# Patient Record
Sex: FEMALE | Race: WHITE | Employment: OTHER | URBAN - METROPOLITAN AREA
[De-identification: names, ages, dates, MRNs, and addresses within clinical notes are randomized per-mention and may not be internally consistent; named-entity substitution may affect disease eponyms.]

---

## 2017-01-05 ENCOUNTER — TELEPHONE (OUTPATIENT)
Dept: GASTROENTEROLOGY | Facility: CLINIC | Age: 57
End: 2017-01-05

## 2017-01-05 ENCOUNTER — ANESTHESIA (OUTPATIENT)
Dept: SURGERY | Facility: CLINIC | Age: 57
End: 2017-01-05
Payer: COMMERCIAL

## 2017-01-05 ENCOUNTER — SURGERY (OUTPATIENT)
Age: 57
End: 2017-01-05

## 2017-01-05 ENCOUNTER — ANESTHESIA EVENT (OUTPATIENT)
Dept: SURGERY | Facility: CLINIC | Age: 57
End: 2017-01-05
Payer: COMMERCIAL

## 2017-01-05 PROCEDURE — 25000125 ZZHC RX 250: Performed by: NURSE ANESTHETIST, CERTIFIED REGISTERED

## 2017-01-05 PROCEDURE — 25800025 ZZH RX 258: Performed by: NURSE ANESTHETIST, CERTIFIED REGISTERED

## 2017-01-05 RX ORDER — PROPOFOL 10 MG/ML
INJECTION, EMULSION INTRAVENOUS PRN
Status: DISCONTINUED | OUTPATIENT
Start: 2017-01-05 | End: 2017-01-05

## 2017-01-05 RX ORDER — NEOSTIGMINE METHYLSULFATE 1 MG/ML
VIAL (ML) INJECTION PRN
Status: DISCONTINUED | OUTPATIENT
Start: 2017-01-05 | End: 2017-01-05

## 2017-01-05 RX ORDER — GLYCOPYRROLATE 0.2 MG/ML
INJECTION, SOLUTION INTRAMUSCULAR; INTRAVENOUS PRN
Status: DISCONTINUED | OUTPATIENT
Start: 2017-01-05 | End: 2017-01-05

## 2017-01-05 RX ORDER — ONDANSETRON 2 MG/ML
INJECTION INTRAMUSCULAR; INTRAVENOUS PRN
Status: DISCONTINUED | OUTPATIENT
Start: 2017-01-05 | End: 2017-01-05

## 2017-01-05 RX ORDER — SODIUM CHLORIDE, SODIUM LACTATE, POTASSIUM CHLORIDE, CALCIUM CHLORIDE 600; 310; 30; 20 MG/100ML; MG/100ML; MG/100ML; MG/100ML
INJECTION, SOLUTION INTRAVENOUS CONTINUOUS PRN
Status: DISCONTINUED | OUTPATIENT
Start: 2017-01-05 | End: 2017-01-05

## 2017-01-05 RX ORDER — LIDOCAINE HYDROCHLORIDE 20 MG/ML
INJECTION, SOLUTION INFILTRATION; PERINEURAL PRN
Status: DISCONTINUED | OUTPATIENT
Start: 2017-01-05 | End: 2017-01-05

## 2017-01-05 RX ORDER — FENTANYL CITRATE 50 UG/ML
INJECTION, SOLUTION INTRAMUSCULAR; INTRAVENOUS PRN
Status: DISCONTINUED | OUTPATIENT
Start: 2017-01-05 | End: 2017-01-05

## 2017-01-05 RX ADMIN — WATER 100 ML: 100 IRRIGANT IRRIGATION at 10:23

## 2017-01-05 RX ADMIN — SODIUM CHLORIDE, POTASSIUM CHLORIDE, SODIUM LACTATE AND CALCIUM CHLORIDE: 600; 310; 30; 20 INJECTION, SOLUTION INTRAVENOUS at 09:20

## 2017-01-05 RX ADMIN — PROPOFOL 60 MG: 10 INJECTION, EMULSION INTRAVENOUS at 09:53

## 2017-01-05 RX ADMIN — PHENYLEPHRINE HYDROCHLORIDE 100 MCG: 10 INJECTION, SOLUTION INTRAMUSCULAR; INTRAVENOUS; SUBCUTANEOUS at 10:15

## 2017-01-05 RX ADMIN — ONDANSETRON 4 MG: 2 INJECTION INTRAMUSCULAR; INTRAVENOUS at 10:23

## 2017-01-05 RX ADMIN — Medication 3.5 MG: at 10:35

## 2017-01-05 RX ADMIN — MIDAZOLAM HYDROCHLORIDE 1.5 MG: 1 INJECTION, SOLUTION INTRAMUSCULAR; INTRAVENOUS at 09:39

## 2017-01-05 RX ADMIN — FENTANYL CITRATE 150 MCG: 50 INJECTION, SOLUTION INTRAMUSCULAR; INTRAVENOUS at 09:53

## 2017-01-05 RX ADMIN — SIMETHICONE 4 ML: 63.3; 3.7 SOLUTION/ DROPS ORAL at 10:23

## 2017-01-05 RX ADMIN — LIDOCAINE HYDROCHLORIDE 50 MG: 20 INJECTION, SOLUTION INFILTRATION; PERINEURAL at 09:53

## 2017-01-05 RX ADMIN — PHENYLEPHRINE HYDROCHLORIDE 100 MCG: 10 INJECTION, SOLUTION INTRAMUSCULAR; INTRAVENOUS; SUBCUTANEOUS at 10:09

## 2017-01-05 RX ADMIN — SODIUM CHLORIDE, POTASSIUM CHLORIDE, SODIUM LACTATE AND CALCIUM CHLORIDE: 600; 310; 30; 20 INJECTION, SOLUTION INTRAVENOUS at 10:30

## 2017-01-05 RX ADMIN — ROCURONIUM BROMIDE 30 MG: 10 INJECTION INTRAVENOUS at 09:53

## 2017-01-05 RX ADMIN — GLYCOPYRROLATE 0.6 MG: 0.2 INJECTION, SOLUTION INTRAMUSCULAR; INTRAVENOUS at 10:35

## 2017-01-05 ASSESSMENT — LIFESTYLE VARIABLES: TOBACCO_USE: 1

## 2017-01-05 NOTE — ANESTHESIA CARE TRANSFER NOTE
Patient: Alka Mcfadden    ENDOSCOPIC ULTRASOUND, ESOPHAGOSCOPY / UPPER GASTROINTESTINAL TRACT (GI) (N/A Esophagus)  COMBINED ESOPHAGOSCOPY, GASTROSCOPY, DUODENOSCOPY (EGD) (N/A Esophagus)  Additional InformationProcedure(s):  Endoscopic Ultrasound, Upper Endoscopy with biopsies - Wound Class: II-Clean Contaminated   - Wound Class: II-Clean Contaminated    Diagnosis: Abdominal Pain, Nausea and Vomiting   Diagnosis Additional Information: No value filed.    Anesthesia Type:   General     Note:  Airway :Face Mask  Patient transferred to:PACU  Comments: tO  Par awakening and ventilating welll color pink  VSS IV infusing well color pink   Report to nurses  [place]   CRNA STEV[place]ILL      Vitals: (Last set prior to Anesthesia Care Transfer)              Electronically Signed By: BOBBY CAMARILLO CRNA  January 5, 2017  10:52 AM

## 2017-01-05 NOTE — ANESTHESIA POSTPROCEDURE EVALUATION
Patient: Alka Mcfadden    ENDOSCOPIC ULTRASOUND, ESOPHAGOSCOPY / UPPER GASTROINTESTINAL TRACT (GI) (N/A Esophagus)  COMBINED ESOPHAGOSCOPY, GASTROSCOPY, DUODENOSCOPY (EGD) (N/A Esophagus)  Additional InformationProcedure(s):  Endoscopic Ultrasound, Upper Endoscopy with biopsies - Wound Class: II-Clean Contaminated   - Wound Class: II-Clean Contaminated    Diagnosis:Abdominal Pain, Nausea and Vomiting   Diagnosis Additional Information: No value filed.    Anesthesia Type:  General    Note:  Anesthesia Post Evaluation    Patient location during evaluation: PACU  Patient participation: Able to fully participate in evaluation  Level of consciousness: awake  Pain management: satisfactory to patient  Airway patency: patent  Cardiovascular status: acceptable  Respiratory status: acceptable  Hydration status: acceptable  PONV: none     Anesthetic complications: None          Last vitals:  Filed Vitals:    01/05/17 0755 01/05/17 1100   BP: 110/78 109/64   Pulse: 79    Temp: 36.4  C (97.6  F)    Resp: 16    SpO2: 99% 98%       Electronically Signed By: Rubio Duvall MD  January 5, 2017  11:03 AM

## 2017-01-05 NOTE — TELEPHONE ENCOUNTER
Please see EGD/EUS report from today.    I did not find any likely source for her pain or weight loss. She does have a gallbladder stone but not features of cholecystitis.    The pancreas is fatty but otherwise unremarkable and this is not of clinical significance.    I took small bowel biopsies per request of inpt GI service but likely to be normal.  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 Presuming this is confirmed, she should be taking her PPI daily, which has a script for but not taking.    I would agree with rec for colonoscopy. She is under the impression this is scheduled someplace.  Also - I do not see any scheduled followup with GI. If requested, this should be with one of the luminal GI docs who saw during her inpt stay or perhaps the outpt luminal GI nurse practitioner. This could be coordinated through our inpt GI NP, Keith Lyman, who I am cc'ing on this note.    JOSELYN Mortensen MD  Associate Professor of Medicine  Division of Gastroenterology, Hepatology and Nutrition  [facility]

## 2017-01-05 NOTE — ANESTHESIA PREPROCEDURE EVALUATION
Anesthesia Evaluation     . Pt has had prior anesthetic. Type: General    No history of anesthetic complications     ROS/MED HX    ENT/Pulmonary:     (+)tobacco use, , . .    Neurologic:     (+)other neuro CRPS lower limb    Cardiovascular:     (+) Dyslipidemia, ----. : . . . :. .       METS/Exercise Tolerance:     Hematologic:  - neg hematologic  ROS       Musculoskeletal:   (+) , , other musculoskeletal- Ankylosing Spondylitis; CRPS lower limb; spine fused      GI/Hepatic:     (+) Other GI/Hepatic nausea; pancreatic atrophy      Renal/Genitourinary:     (+) Other Renal/ Genitourinary, vaginal bleeding      Endo:  - neg endo ROS       Psychiatric:     (+) psychiatric history anxiety, depression and other (comment)      Infectious Disease:  - neg infectious disease ROS       Malignancy:      - no malignancy   Other:    (+) H/O Chronic Pain,H/O chronic opiod use ,              Physical Exam  Normal systems: cardiovascular and pulmonary    Airway   Mallampati: I  TM distance: >3 FB  Neck ROM: limited    Dental   (+) upper dentures    Cardiovascular   Rhythm and rate: regular and normal      Pulmonary    breath sounds clear to auscultation                    Anesthesia Plan      History & Physical Review  History and physical reviewed and following examination; no interval change.    ASA Status:  3 .    NPO Status:  > 8 hours    Plan for General with Intravenous and Propofol induction. Maintenance will be Balanced.    PONV prophylaxis:  Ondansetron (or other 5HT-3)  Additional equipment: Videolaryngoscope      Postoperative Care  Postoperative pain management:  IV analgesics and Multi-modal analgesia.      Consents                          .

## 2017-01-06 ENCOUNTER — TELEPHONE (OUTPATIENT)
Dept: GASTROENTEROLOGY | Facility: CLINIC | Age: 57
End: 2017-01-06

## 2017-01-06 NOTE — TELEPHONE ENCOUNTER
Called patient to schedule follow up appointment with Gi to further assess abd pain and barretts. Patient states she will call back later date to schedule she would like to get colonoscopy scheduled first which she will do with her provider.i did give patient my phone number to reach me once she is ready to schedule.

## 2017-01-07 ENCOUNTER — TELEPHONE (OUTPATIENT)
Dept: FAMILY MEDICINE | Facility: CLINIC | Age: 57
End: 2017-01-07

## 2017-01-07 NOTE — TELEPHONE ENCOUNTER
Please contact Alka and her  and schedule a colonoscopy as soon as possible, give the prep needed in the prior three days.  Please let me know if there is any difficulty scheduling this. She has lost a lot of weight and the other possible sources of acute concern (stomach and pancrease) have recently been examined and the results do not point to a likely cause of her weight loss and chronic pain.  This makes the Colonoscopy more urgent.  The orders are already in EPIC.

## 2017-01-09 NOTE — TELEPHONE ENCOUNTER
Patient scheudled with [facility] 1/13/2017 at 3:30pm for a colonsocopy. Patient notified.        Monique Maya,  Care Management Coordinator

## 2017-01-11 ENCOUNTER — TELEPHONE (OUTPATIENT)
Dept: FAMILY MEDICINE | Facility: CLINIC | Age: 57
End: 2017-01-11

## 2017-01-11 DIAGNOSIS — K22.70 BARRETT'S ESOPHAGUS DETERMINED BY BIOPSY: Primary | ICD-10-CM

## 2017-01-11 NOTE — TELEPHONE ENCOUNTER
Reason for Call:  Request for results:    Name of test or procedure: Endoscopy     Date of test of procedure: 1/5/17    Location of the test or procedure: [facility]     OK to leave the result message on voice mail or with a family member? YES    Phone number Patient can be reached at:  Home number on file [phone] (home)    Additional comments: pt would also like something for her Acid Reflux     Call taken on 1/11/2017 at 10:00 AM by Pawel De La Rsoa        Thank you,     Pawel De La Rosa     [facility]     n

## 2017-01-11 NOTE — TELEPHONE ENCOUNTER
Will forward to PCP to review procedure and advise, Writer will call pt after.    Nickolas Dempsey RN

## 2017-01-11 NOTE — TELEPHONE ENCOUNTER
Writer called pt back.  Pt is scheduled with FELTON Davila on Thursday of this week.    Writer did discuss with pt Haney's esophagus and the importance of using an antacid, Writer informed Pt that Marlin would be ordering a new medication for this.    Pt is concerned about her prep, However Pt did state that she eats very little also.    Writer encouraged pt to do the best that she can to complete her prep.  Writer requested that even if Pt is unable to complete that pt should go to Colonoscopy and inform them what she was able to complete.  With how little she is eating at this point even some prep might be enough to complete the Colonoscopy.    Pt was comfortable with writers discussion with Pt about her esophagus and isn't expecting a call from provider at this time.  Pt is scheduled to see Marlin on 2/2/17    Nickolas Dempsey RN

## 2017-01-11 NOTE — TELEPHONE ENCOUNTER
She has a new diagnosis of Haney's Esophagus.  I am happy to discuss this with her over the phone if she would like tomorrow (1/12/2017).  What I am most concerned about is getting a colonoscopy scheduled. Spoke with Nickolas via phone and he plans to call her, tell her about the PPI order and explore when she is having the colonoscopy and what support we can provide for her.

## 2017-01-13 ENCOUNTER — TRANSFERRED RECORDS (OUTPATIENT)
Dept: HEALTH INFORMATION MANAGEMENT | Facility: CLINIC | Age: 57
End: 2017-01-13

## 2017-01-20 ENCOUNTER — TELEPHONE (OUTPATIENT)
Dept: GASTROENTEROLOGY | Facility: CLINIC | Age: 57
End: 2017-01-20

## 2017-01-20 NOTE — TELEPHONE ENCOUNTER
Please arrange for repeat EGD in 3 years. Ind = followup Haney's.    JOSELYN Mortensen MD  Associate Professor of Medicine  Division of Gastroenterology, Hepatology and Nutrition  [facility]

## 2017-02-02 ENCOUNTER — OFFICE VISIT (OUTPATIENT)
Dept: BEHAVIORAL HEALTH | Facility: CLINIC | Age: 57
End: 2017-02-02
Payer: COMMERCIAL

## 2017-02-02 ENCOUNTER — OFFICE VISIT (OUTPATIENT)
Dept: FAMILY MEDICINE | Facility: CLINIC | Age: 57
End: 2017-02-02
Payer: COMMERCIAL

## 2017-02-02 VITALS
HEART RATE: 96 BPM | SYSTOLIC BLOOD PRESSURE: 136 MMHG | WEIGHT: 102 LBS | DIASTOLIC BLOOD PRESSURE: 87 MMHG | BODY MASS INDEX: 16.97 KG/M2 | TEMPERATURE: 98.7 F | RESPIRATION RATE: 12 BRPM

## 2017-02-02 DIAGNOSIS — R35.0 URINARY FREQUENCY: ICD-10-CM

## 2017-02-02 DIAGNOSIS — R63.4 UNEXPLAINED WEIGHT LOSS: ICD-10-CM

## 2017-02-02 DIAGNOSIS — M45.9 ANKYLOSING SPONDYLITIS (H): Primary | ICD-10-CM

## 2017-02-02 DIAGNOSIS — G89.4 CHRONIC PAIN SYNDROME: ICD-10-CM

## 2017-02-02 DIAGNOSIS — K22.70 BARRETT'S ESOPHAGUS WITHOUT DYSPLASIA: ICD-10-CM

## 2017-02-02 DIAGNOSIS — F41.9 ANXIETY: Primary | ICD-10-CM

## 2017-02-02 LAB
ALBUMIN UR-MCNC: NEGATIVE MG/DL
APPEARANCE UR: CLEAR
BACTERIA #/AREA URNS HPF: ABNORMAL /HPF
BILIRUB UR QL STRIP: NEGATIVE
COLOR UR AUTO: YELLOW
ERYTHROCYTE [DISTWIDTH] IN BLOOD BY AUTOMATED COUNT: 12.7 % (ref 10–15)
GLUCOSE UR STRIP-MCNC: NEGATIVE MG/DL
HCT VFR BLD AUTO: 44.2 % (ref 35–47)
HGB BLD-MCNC: 14.3 G/DL (ref 11.7–15.7)
HGB UR QL STRIP: NEGATIVE
KETONES UR STRIP-MCNC: NEGATIVE MG/DL
LEUKOCYTE ESTERASE UR QL STRIP: ABNORMAL
MCH RBC QN AUTO: 31 PG (ref 26.5–33)
MCHC RBC AUTO-ENTMCNC: 32.4 G/DL (ref 31.5–36.5)
MCV RBC AUTO: 96 FL (ref 78–100)
NITRATE UR QL: NEGATIVE
NON-SQ EPI CELLS #/AREA URNS LPF: ABNORMAL /LPF
PH UR STRIP: 6.5 PH (ref 5–7)
PLATELET # BLD AUTO: 371 10E9/L (ref 150–450)
RBC # BLD AUTO: 4.62 10E12/L (ref 3.8–5.2)
RBC #/AREA URNS AUTO: ABNORMAL /HPF (ref 0–2)
SP GR UR STRIP: 1.01 (ref 1–1.03)
URN SPEC COLLECT METH UR: ABNORMAL
UROBILINOGEN UR STRIP-ACNC: 0.2 EU/DL (ref 0.2–1)
WBC # BLD AUTO: 10 10E9/L (ref 4–11)
WBC #/AREA URNS AUTO: ABNORMAL /HPF (ref 0–2)

## 2017-02-02 PROCEDURE — 80053 COMPREHEN METABOLIC PANEL: CPT | Performed by: NURSE PRACTITIONER

## 2017-02-02 PROCEDURE — 84443 ASSAY THYROID STIM HORMONE: CPT | Performed by: NURSE PRACTITIONER

## 2017-02-02 PROCEDURE — 99215 OFFICE O/P EST HI 40 MIN: CPT | Performed by: NURSE PRACTITIONER

## 2017-02-02 PROCEDURE — 36415 COLL VENOUS BLD VENIPUNCTURE: CPT | Performed by: NURSE PRACTITIONER

## 2017-02-02 PROCEDURE — 81001 URINALYSIS AUTO W/SCOPE: CPT | Performed by: NURSE PRACTITIONER

## 2017-02-02 PROCEDURE — 90832 PSYTX W PT 30 MINUTES: CPT | Performed by: SOCIAL WORKER

## 2017-02-02 PROCEDURE — 85027 COMPLETE CBC AUTOMATED: CPT | Performed by: NURSE PRACTITIONER

## 2017-02-02 RX ORDER — OMEPRAZOLE 40 MG/1
40 CAPSULE, DELAYED RELEASE ORAL DAILY
Qty: 30 CAPSULE | Refills: 1 | Status: SHIPPED | OUTPATIENT
Start: 2017-02-02 | End: 2018-06-29 | Stop reason: ALTCHOICE

## 2017-02-02 RX ORDER — HYDROCODONE BITARTRATE AND ACETAMINOPHEN 5; 325 MG/1; MG/1
1 TABLET ORAL EVERY 6 HOURS PRN
COMMUNITY
End: 2017-02-05 | Stop reason: ALTCHOICE

## 2017-02-02 RX ORDER — TEMAZEPAM 30 MG
30 CAPSULE ORAL
Qty: 30 CAPSULE | Refills: 0 | Status: SHIPPED | OUTPATIENT
Start: 2017-02-02 | End: 2017-03-30

## 2017-02-02 RX ORDER — HYDROCODONE BITARTRATE AND ACETAMINOPHEN 7.5; 325 MG/15ML; MG/15ML
15 SOLUTION ORAL 3 TIMES DAILY PRN
Qty: 473 ML | Refills: 0 | Status: SHIPPED | OUTPATIENT
Start: 2017-02-02 | End: 2017-02-05

## 2017-02-02 NOTE — MR AVS SNAPSHOT
"              After Visit Summary   2017    Alka Mcfadden    MRN: [id number]           Patient Information     Date Of Birth          1960        Visit Information        Provider Department      2017 4:00 PM Shanda Vo APRN CNP [facility]        Today's Diagnoses     Chronic pain syndrome    -  1        Follow-ups after your visit        Who to contact     If you have questions or need follow up information about today's clinic visit or your schedule please contact [facility] directly at [phone].  Normal or non-critical lab and imaging results will be communicated to you by [employer], letter or phone within 4 business days after the clinic has received the results. If you do not hear from us within 7 days, please contact the clinic through Happy [employer] or phone. If you have a critical or abnormal lab result, we will notify you by phone as soon as possible.  Submit refill requests through [employer] family too or call your pharmacy and they will forward the refill request to us. Please allow 3 business days for your refill to be completed.          Additional Information About Your Visit        MyChart Information     [employer] lets you send messages to your doctor, view your test results, renew your prescriptions, schedule appointments and more. To sign up, go to [email or web address] family too . Click on \"Log in\" on the left side of the screen, which will take you to the Welcome page. Then click on \"Sign up Now\" on the right side of the page.     You will be asked to enter the access code listed below, as well as some personal information. Please follow the directions to create your username and password.     Your access code is: [email or web address]  Expires: 3/26/2017  3:42 PM     Your access code will  in 90 days. If you need help or a new code, please call your [facility] or [phone].        Care EveryWhere ID     This is your Care EveryWhere ID. " This could be used by other organizations to access your [place] medical records  [id number]        Your Vitals Were     Pulse Temperature Respirations             96 98.7  F (37.1  C) (Oral) 12          Blood Pressure from Last 3 Encounters:   02/02/17 136/87   01/05/17 129/64   12/26/16 142/85    Weight from Last 3 Encounters:   02/02/17 102 lb (46.267 kg)   01/05/17 104 lb 0.9 oz (47.2 kg)   12/26/16 98 lb (44.453 kg)              Today, you had the following     No orders found for display         Today's Medication Changes          These changes are accurate as of: 2/2/17  4:40 PM.  If you have any questions, ask your nurse or doctor.               These medicines have changed or have updated prescriptions.        Dose/Directions    * HYDROcodone-acetaminophen 5-325 MG per tablet   Commonly known as:  NORCO   This may have changed:  Another medication with the same name was added. Make sure you understand how and when to take each.   Changed by:  Shanda Vo APRN CNP        Dose:  1 tablet   Take 1 tablet by mouth every 6 hours as needed for moderate to severe pain   Refills:  0       * HYDROcodone-acetaminophen 7.5-325 MG/15ML solution   Commonly known as:  LORTAB   This may have changed:  You were already taking a medication with the same name, and this prescription was added. Make sure you understand how and when to take each.   Used for:  Chronic pain syndrome   Changed by:  Shanda Vo APRN CNP        Dose:  15 mL   Take 15 mLs by mouth 3 times daily as needed for moderate to severe pain   Quantity:  473 mL   Refills:  0       * Notice:  This list has 2 medication(s) that are the same as other medications prescribed for you. Read the directions carefully, and ask your doctor or other care provider to review them with you.         Where to get your medicines      Some of these will need a paper prescription and others can be bought over the counter.  Ask your nurse if you have  questions.     Bring a paper prescription for each of these medications    - HYDROcodone-acetaminophen 7.5-325 MG/15ML solution             Primary Care Provider Office Phone # Fax #    BOBBY Balbuena [phone]       [facility] [phone] [facility] [address]  [facility] [address]        Thank you!     Thank you for choosing [facility]  for your care. Our goal is always to provide you with excellent care. Hearing back from our patients is one way we can continue to improve our services. Please take a few minutes to complete the written survey that you may receive in the mail after your visit with us. Thank you!             Your Updated Medication List - Protect others around you: Learn how to safely use, store and throw away your medicines at www.disposemymeds.org.          This list is accurate as of: 2/2/17  4:40 PM.  Always use your most recent med list.                   Brand Name Dispense Instructions for use    * HYDROcodone-acetaminophen 5-325 MG per tablet    NORCO     Take 1 tablet by mouth every 6 hours as needed for moderate to severe pain       * HYDROcodone-acetaminophen 7.5-325 MG/15ML solution    LORTAB    473 mL    Take 15 mLs by mouth 3 times daily as needed for moderate to severe pain       temazepam 30 MG capsule    RESTORIL    30 capsule    Take 1 capsule (30 mg) by mouth nightly as needed for sleep       * Notice:  This list has 2 medication(s) that are the same as other medications prescribed for you. Read the directions carefully, and ask your doctor or other care provider to review them with you.

## 2017-02-02 NOTE — NURSING NOTE
"Chief Complaint   Patient presents with     RECHECK       Initial /87 mmHg  Pulse 96  Temp(Src) 98.7  F (37.1  C) (Oral)  Resp 12  Wt 102 lb (46.267 kg)  SpO2  Estimated body mass index is 16.97 kg/(m^2) as calculated from the following:    Height as of 1/5/17: 5' 5\" (1.651 m).    Weight as of this encounter: 102 lb (46.267 kg).  BP completed using cuff size: regular L arm    Oswaldo Santiago MA    "

## 2017-02-02 NOTE — PROGRESS NOTES
SUBJECTIVE:                                                    Alka Mcfadden is a 56 year old female who presents to clinic today for the following health issues:    GI symptoms and weight loss concern :  Recently underwent an extensive GI work up. She had an Endocopy/Endoscopic Ultrasound on 1/5/2017 followed by a Colonoscopy 1/13/2017. Upper EUS suggestive for Long-segment Haney's Esophagus with no evidence of Dysplasia or malignancy from Biopsy, No evidence of celiac sprue or peptic duodenitis, H. Pylori was negative.  Recommendation was to repeat this procedure in 3 years.  Colonoscopy: Colorectal polyp detected, Diverticulosis of the colon without diverticulitis, Constipation, unspecified constipation type, She is to repeat the procedure in one year, take NO NSAIDS and encouraged all 1st degree relatives have colonoscopies by the time they are 48 years old.      GI Symptoms:      Duration: Continuing to have abdominal pain, some struggles with eating, however her PO intake has increased and her weight is up 4 lbs since her last visit with.     Description (location/character/radiation): Since polyps have been removed, there feels to be more room for food. Solid Waste.  Trying to gain weight. She was completely cleaned out before the colonoscopy.  She is now having regular bowel movements     Intensity:  Varies, it is however worse in the morning.    Accompanying signs and symptoms: Feels like every nerve in the digestive system is being played.(used the analysis of her nerves being played with a stringed instrument bow)    History (similar episodes/previous evaluation): Yes.    Precipitating or alleviating factors: None    Painful urination    Duration: Since the colonoscopy    Description (location/character/radiation): Painful urination, Yellowish discharge, burning    Intensity:  severe    Accompanying signs and symptoms: Constant pain.    Precipitating or alleviating factors: Constant    Therapies tried  and outcome: None other than drinking lots of water.         Back Pain Follow Up       Description:   Location of pain:  bilateral  Character of pain: sharp, gnawing, burning and constant  Pain radiation: Does not radiate and radiates into the left buttocks  Since last visit, pain is:  Worsened, however, she has not been on any medication for pain since mid December on a regular basis  New numbness or weakness in legs, not attributed to pain:  no     Intensity: Currently 7/10, worst is 10/10    History:   Pain interferes with job: Not applicable  Therapies tried with relief: chiropractor, heat, muscle relaxants, opioids and rest  Therapies tried without relief: massage, NSAIDs, Physical Therapy and steroid injection      Accompanying Signs & Symptoms:  Risk of Fracture:  Osteoporosis  Risk of Cauda Equina:  None  Risk of Infection:  None  Risk of Cancer:  Unexplained weight loss           Problem list and histories reviewed & adjusted, as indicated.  Additional history: as documented    Patient Active Problem List   Diagnosis     Ankylosing spondylitis (H)     CRPS (complex regional pain syndrome), lower limb     Encounter for long-term use of opiate analgesic     Hyperalgesia      ### Cataract, OU     Dry eyes     Hyperlipidemia LDL goal <130     Constipation     Insomnia     Tobacco use disorder     Open wound of knee, leg, and ankle     Anxiety     Chronic pain syndrome     Abdominal pain, right upper quadrant     Suicidal ideation     Nausea     Unexplained weight loss     Vaginal bleeding     Abdominal pain     Pancreatic atrophy     Haney's esophagus determined by biopsy     Past Surgical History   Procedure Laterality Date     Back surgery  1985/1990/1996     Joint replacement  1985     Arthroplasty revision hip  2005, 2015     Endoscopic ultrasound upper gastrointestinal tract (gi) N/A 1/5/2017     Procedure: ENDOSCOPIC ULTRASOUND, ESOPHAGOSCOPY / UPPER GASTROINTESTINAL TRACT (GI);  Surgeon: Esteban Mortensen  MD Wilson;  [facility]: [facility]     Esophagoscopy, gastroscopy, duodenoscopy (egd), combined N/A 1/5/2017     Procedure: COMBINED ESOPHAGOSCOPY, GASTROSCOPY, DUODENOSCOPY (EGD);  Surgeon: Esteban Mortensen MD;  Location:  OR       Social History   Substance Use Topics     Smoking status: Light Tobacco Smoker     Types: Cigarettes     Smokeless tobacco: Never Used      Comment: 3-4 cigarettes per week.     Alcohol Use: No     History reviewed. No pertinent family history.      Current Outpatient Prescriptions   Medication Sig Dispense Refill     ciprofloxacin (CIPRO) 500 MG tablet Take 1 tablet (500 mg) by mouth 2 times daily 6 tablet 0     HYDROcodone-acetaminophen (NORCO) 5-325 MG per tablet Take 1 tablet by mouth every 6 hours as needed for moderate to severe pain       HYDROcodone-acetaminophen (LORTAB) 7.5-325 MG/15ML solution Take 15 mLs by mouth 3 times daily as needed for moderate to severe pain 473 mL 0     omeprazole (PRILOSEC) 40 MG capsule Take 1 capsule (40 mg) by mouth daily Take 30-60 minutes before a meal. 30 capsule 1     temazepam (RESTORIL) 30 MG capsule Take 1 capsule (30 mg) by mouth nightly as needed for sleep 30 capsule 0     Allergies   Allergen Reactions     Amoxicillin-Pot Clavulanate Rash     Augmentin Rash     Amitriptyline      Had reaction while on this and vistaril; was just not herself.     Hydroxyzine Other (See Comments)     Had reaction when took this along with amitriptyline.     Vistaril      Had reaction when took this along with amitriptyline.     Recent Labs   Lab Test  02/02/17   1657  01/05/17   0904   12/01/16   1349  12/01/16   0937  10/09/14   LDL   --    --    --    --    --    --   126   HDL   --    --    --    --    --    --   39   TRIG   --    --    --    --    --    --   137   ALT  30  16   --    --   15   < >   --    CR  0.75  0.64   < >   --   0.68   < >   --    GFRESTIMATED  79  >90  Non  GFR Calc     < >   --   89   < >   --    GFRESTBLACK   >90   GFR Calc    >90   GFR Calc     < >   --   >90   GFR Calc     < >   --    POTASSIUM  4.4  4.2   < >   --   3.7   < >   --    TSH  0.70   --    --   0.34*   --    < >   --     < > = values in this interval not displayed.      BP Readings from Last 3 Encounters:   02/02/17 136/87   01/05/17 129/64   12/26/16 142/85    Wt Readings from Last 3 Encounters:   02/02/17 102 lb (46.267 kg)   01/05/17 104 lb 0.9 oz (47.2 kg)   12/26/16 98 lb (44.453 kg)                  Labs reviewed in EPIC    ROS:  Constitutional, HEENT, cardiovascular, pulmonary, GI, , musculoskeletal, neuro, skin, endocrine and psych systems are negative, except as otherwise noted.    OBJECTIVE:                                                    /87 mmHg  Pulse 96  Temp(Src) 98.7  F (37.1  C) (Oral)  Resp 12  Wt 102 lb (46.267 kg)  SpO2   Body mass index is 16.97 kg/(m^2).  GENERAL: alert, no acute distress and thin and chronically ill appearing.  EYES: Eyes grossly normal to inspection, PERRL and conjunctivae and sclerae normal  NECK: no adenopathy, no asymmetry, masses, or scars and thyroid normal to palpation  RESP: lungs clear to auscultation - no rales, rhonchi or wheezes  CV: regular rate and rhythm, normal S1 S2, no S3 or S4, no murmur, click or rub, no peripheral edema and peripheral pulses strong  ABDOMEN: thin, pain and pressure with palpation of upper and lower abdomen, especially right/left upper quad.    MS: Hardware from previous spinal surgery visible in outline through the skin on back, no edema  MENTAL STATUS EXAM  Appearance: appropriate  Attitude: cooperative  Behavior: normal  Eyre Contact: normal  Speech: normal  Orientation: oreinted to person , place, time and situation  Mood:  admits sadness and anxiety most of time  Affect: Mood Congruient  Thought Process: clear  Suicidal Ideation: reports thoughts, no specific intention today, she has been in contact with folks at  the right to die websites.  Hallucination: no  Diagnostic Test Results:  Results for orders placed or performed in visit on 02/02/17   Comprehensive metabolic panel (BMP + Alb, Alk Phos, ALT, AST, Total. Bili, TP)   Result Value Ref Range    Sodium 137 133 - 144 mmol/L    Potassium 4.4 3.4 - 5.3 mmol/L    Chloride 99 94 - 109 mmol/L    Carbon Dioxide 29 20 - 32 mmol/L    Anion Gap 9 3 - 14 mmol/L    Glucose 124 (H) 70 - 99 mg/dL    Urea Nitrogen 13 7 - 30 mg/dL    Creatinine 0.75 0.52 - 1.04 mg/dL    GFR Estimate 79 >60 mL/min/1.7m2    GFR Estimate If Black >90   GFR Calc   >60 mL/min/1.7m2    Calcium 10.2 (H) 8.5 - 10.1 mg/dL    Bilirubin Total 0.4 0.2 - 1.3 mg/dL    Albumin 4.3 3.4 - 5.0 g/dL    Protein Total 8.4 6.8 - 8.8 g/dL    Alkaline Phosphatase 116 40 - 150 U/L    ALT 30 0 - 50 U/L    AST 15 0 - 45 U/L   CBC with platelets   Result Value Ref Range    WBC 10.0 4.0 - 11.0 10e9/L    RBC Count 4.62 3.8 - 5.2 10e12/L    Hemoglobin 14.3 11.7 - 15.7 g/dL    Hematocrit 44.2 35.0 - 47.0 %    MCV 96 78 - 100 fl    MCH 31.0 26.5 - 33.0 pg    MCHC 32.4 31.5 - 36.5 g/dL    RDW 12.7 10.0 - 15.0 %    Platelet Count 371 150 - 450 10e9/L   TSH with free T4 reflex   Result Value Ref Range    TSH 0.70 0.40 - 4.00 mU/L   UA reflex to Microscopic and Culture   Result Value Ref Range    Color Urine Yellow     Appearance Urine Clear     Glucose Urine Negative NEG mg/dL    Bilirubin Urine Negative NEG    Ketones Urine Negative NEG mg/dL    Specific Gravity Urine 1.010 1.003 - 1.035    Blood Urine Negative NEG    pH Urine 6.5 5.0 - 7.0 pH    Protein Albumin Urine Negative NEG mg/dL    Urobilinogen Urine 0.2 0.2 - 1.0 EU/dL    Nitrite Urine Negative NEG    Leukocyte Esterase Urine Trace (A) NEG    Source Midstream Urine    Urine Microscopic   Result Value Ref Range    WBC Urine 2-5 (A) 0 - 2 /HPF    RBC Urine O - 2 0 - 2 /HPF    Squamous Epithelial /LPF Urine Few FEW /LPF    Bacteria Urine Few (A) NEG /HPF         ASSESSMENT/PLAN:                                                      (M45.9) Ankylosing spondylitis (H)  (primary encounter diagnosis)  Comment: Remains the primary source of pain and discomfort.    Plan: HYDROcodone-acetaminophen (NORCO) 5-325 MG per         tablet, CBC with platelets, temazepam         (RESTORIL) 30 MG capsule            (G89.4) Chronic pain syndrome  Comment: Has just completed a GI work up that did not clearly identify a new source of pain other than epigastric discomfort  She recently took herself off all of her pain medications.  Would like to have a PRN pain medication available for night time use in particular.   Plan: HYDROcodone-acetaminophen (LORTAB) 7.5-325         MG/15ML solution, Comprehensive metabolic panel        (BMP + Alb, Alk Phos, ALT, AST, Total. Bili,         TP), CBC with platelets, temazepam (RESTORIL)         30 MG capsule, Urine Microscopic        Will revisit long term pain medication plan at next visit.     (K22.70) Haney's esophagus without dysplasia  Comment: New understanding of upper GI discomfort. Increased surveillance of Esophagus and colon advised.    Plan: omeprazole (PRILOSEC) 40 MG capsule,         Comprehensive metabolic panel (BMP + Alb, Alk         Phos, ALT, AST, Total. Bili, TP)        Discussed critical need to take PPI and or H2 blocker on a regular basis.     (R63.4) Unexplained weight loss  Comment: Weight has improved and somewhat stabilized at this time.   Plan: Comprehensive metabolic panel (BMP + Alb, Alk         Phos, ALT, AST, Total. Bili, TP), CBC with         platelets, TSH with free T4 reflex        Will keep a close eye on weight, unexplained weight loss in the future will consider further testing and treatment.   Discussed calorie dense foods to focus on.  Offered nutrition consult, declining at this time.     (R35.0) Urinary frequency  Comment: Evaluate for UTI, new symptoms since Colonoscopy  Plan: UA reflex to Microscopic and  Culture,         ciprofloxacin (CIPRO) 500 MG tablet          Greater than 40 minutes was spent with this patient, with greater than 50 % in counselling about Ankylosing spondylitis, chronic pain, Weight loss, results of Endoscopic Ultrasound, and Colonoscopy.     MEDICATIONS:   Orders Placed This Encounter   Medications     HYDROcodone-acetaminophen (NORCO) 5-325 MG per tablet     Sig: Take 1 tablet by mouth every 6 hours as needed for moderate to severe pain     HYDROcodone-acetaminophen (LORTAB) 7.5-325 MG/15ML solution     Sig: Take 15 mLs by mouth 3 times daily as needed for moderate to severe pain     Dispense:  473 mL     Refill:  0     omeprazole (PRILOSEC) 40 MG capsule     Sig: Take 1 capsule (40 mg) by mouth daily Take 30-60 minutes before a meal.     Dispense:  30 capsule     Refill:  1     temazepam (RESTORIL) 30 MG capsule     Sig: Take 1 capsule (30 mg) by mouth nightly as needed for sleep     Dispense:  30 capsule     Refill:  0     ciprofloxacin (CIPRO) 500 MG tablet     Sig: Take 1 tablet (500 mg) by mouth 2 times daily     Dispense:  6 tablet     Refill:  0     Medications Discontinued During This Encounter   Medication Reason     esomeprazole (NEXIUM) 20 MG CR capsule Stopped by Patient     Gabapentin (NEURONTIN PO) Stopped by Patient     temazepam (RESTORIL) 30 MG capsule Reorder          - Continue other medications without change  FUTURE APPOINTMENTS:       - Follow-up visit in 3 weeks  Regular exercise    BOBBY Butcher [facility]

## 2017-02-03 ENCOUNTER — TELEPHONE (OUTPATIENT)
Dept: FAMILY MEDICINE | Facility: CLINIC | Age: 57
End: 2017-02-03

## 2017-02-03 LAB
ALBUMIN SERPL-MCNC: 4.3 G/DL (ref 3.4–5)
ALP SERPL-CCNC: 116 U/L (ref 40–150)
ALT SERPL W P-5'-P-CCNC: 30 U/L (ref 0–50)
ANION GAP SERPL CALCULATED.3IONS-SCNC: 9 MMOL/L (ref 3–14)
AST SERPL W P-5'-P-CCNC: 15 U/L (ref 0–45)
BILIRUB SERPL-MCNC: 0.4 MG/DL (ref 0.2–1.3)
BUN SERPL-MCNC: 13 MG/DL (ref 7–30)
CALCIUM SERPL-MCNC: 10.2 MG/DL (ref 8.5–10.1)
CHLORIDE SERPL-SCNC: 99 MMOL/L (ref 94–109)
CO2 SERPL-SCNC: 29 MMOL/L (ref 20–32)
CREAT SERPL-MCNC: 0.75 MG/DL (ref 0.52–1.04)
GFR SERPL CREATININE-BSD FRML MDRD: 79 ML/MIN/1.7M2
GLUCOSE SERPL-MCNC: 124 MG/DL (ref 70–99)
POTASSIUM SERPL-SCNC: 4.4 MMOL/L (ref 3.4–5.3)
PROT SERPL-MCNC: 8.4 G/DL (ref 6.8–8.8)
SODIUM SERPL-SCNC: 137 MMOL/L (ref 133–144)
TSH SERPL DL<=0.005 MIU/L-ACNC: 0.7 MU/L (ref 0.4–4)

## 2017-02-03 RX ORDER — CIPROFLOXACIN 500 MG/1
500 TABLET, FILM COATED ORAL 2 TIMES DAILY
Qty: 6 TABLET | Refills: 0 | Status: SHIPPED | OUTPATIENT
Start: 2017-02-03 | End: 2017-03-30

## 2017-02-03 NOTE — TELEPHONE ENCOUNTER
Pt is interested in trying medical marijuana for her chronic  pain instead of vicodin, pt thinks that this is a safer alternative. Pt would like to know if provider is able to refer her somewhere to look in to being certified for this.  Please review and advise.  Thank you,  Bhumi Sanchez RN     Reviewed with patient results indicating UTI and normal CBC, patient verbalized understanding and in agreement with plan.  Bhumi Sanchez RN

## 2017-02-03 NOTE — TELEPHONE ENCOUNTER
She can look on the Medical Marijuana Website.  I understand there are providers that they can point you too.  I will be certified some day but it will take some time.

## 2017-02-03 NOTE — TELEPHONE ENCOUNTER
Reviewed the results of the labs done yesterday.  She has a hint of a Urinary tract infection.  I am happy to send a prescription to her pharmacy if she would like to be treated.  I am still waiting for her blood sugar and her thyroid numbers and will get back to her when the results come back.  However, for now her Complete blood count is normal, and there is the hint of a UA.    I will send a prescription to her pharmacy and she can pick it up if she wants.

## 2017-02-05 DIAGNOSIS — G89.4 CHRONIC PAIN SYNDROME: Primary | ICD-10-CM

## 2017-02-05 RX ORDER — HYDROCODONE BITARTRATE AND ACETAMINOPHEN 7.5; 325 MG/15ML; MG/15ML
15 SOLUTION ORAL 3 TIMES DAILY PRN
Qty: 473 ML | Refills: 0 | Status: SHIPPED | OUTPATIENT
Start: 2017-03-03 | End: 2017-03-16

## 2017-02-06 NOTE — PROGRESS NOTES
Printed March-April prescription for her to  in my absence.  Placed at .   Shanda Vo CNP, APNP  [facility]

## 2017-02-07 ENCOUNTER — TELEPHONE (OUTPATIENT)
Dept: FAMILY MEDICINE | Facility: CLINIC | Age: 57
End: 2017-02-07

## 2017-02-07 DIAGNOSIS — G89.4 CHRONIC PAIN SYNDROME: Primary | ICD-10-CM

## 2017-02-07 DIAGNOSIS — F41.9 ANXIETY: ICD-10-CM

## 2017-02-07 RX ORDER — DIAZEPAM 10 MG
5-10 TABLET ORAL EVERY 12 HOURS PRN
Qty: 60 TABLET | Refills: 0 | Status: SHIPPED | OUTPATIENT
Start: 2017-02-07 | End: 2017-12-13

## 2017-02-07 NOTE — TELEPHONE ENCOUNTER
Fax received 02/06/17    Diazepam 10 MG      Last Written Prescription Date: 11/10/16, last fill date. Per pharmacy  Last Fill Quantity: 60,  # refills: unknown   Last Office Visit with [facility], [facility] or [facility] prescribing provider: 02/02/17                                         Next 5 appointments (look out 90 days)     Mar 16, 2017  4:00 PM   Return Visit with BOBBY Balbuena CNP   [facility] ([facility])    [address]  [address]  [facility] [address]   [phone]            Mar 16, 2017  4:00 PM   Return Visit with Vaughn Eldridge [facility] ([facility])    [address]  [address]  [facility] [address]   [phone]

## 2017-02-07 NOTE — TELEPHONE ENCOUNTER
Controlled Substance Refill Request for Diazepam    Last refill: 11/10/16, 60 tablets     Last clinic visit: 2/2/17    Next appt:3/16/17    Controlled substance agreement on file: No.    Documentation in problem list reviewed:  Yes    Processing:  Fax Rx to pt's pharmacy    [place] reviewed:  reviewed- no concerns  [place]:  [email or web address]  Thank you  Bhumi Sanchez RN

## 2017-02-08 NOTE — PROGRESS NOTES
[facility]   February 2, 2017      Behavioral Health Clinician Progress Note    Patient Name: Alka Mcfadden           Service Type: Individual      Service Location:   Face to Face in Clinic     Session Start Time: 4:26pm  Session End Time: 5pm      Session Length: 16 - 37      Attendees: Patient and Spouse / Significant Other    Visit Activities (Refresh list every visit): [facility] [place] [employer]    Diagnostic Assessment Date: not completed yet  Treatment Plan Review Date: not completed yet  See Flowsheets for today's PHQ-9 and JAMAL-7 results  Previous PHQ-9:   PHQ-9 SCORE 12/8/2015 7/15/2016 12/5/2016   Total Score - - -   Total Score - - -   Total Score 0 0 0     Previous JAMAL-7:   JAMAL-7 SCORE 12/8/2015 7/15/2016 12/5/2016   Total Score - - -   Total Score 0 0 0   Total Score - - -       KENDRA LEVEL:  No flowsheet data found.    DATA  Extended Session (60+ minutes): No  Interactive Complexity: No  Crisis: No    Treatment Objective(s) Addressed in This Session:  Target Behavior(s): disease management/lifestyle changes mental health    Depressed Mood: Increase interest, engagement, and pleasure in doing things  Decrease frequency and intensity of feeling down, depressed, hopeless  Identify negative self-talk and behaviors: challenge core beliefs, myths, and actions  Improve concentration, focus, and mindfulness in daily activities   Anxiety: will experience a reduction in anxiety, will develop more effective coping skills to manage anxiety symptoms, will develop healthy cognitive patterns and beliefs and will increase ability to function adaptively  Psychological distress related to Pain  Psychological distress related to Chronic Disease Management    Current Stressors / Issues:  The patient reported that she was recently prescribed a reduced amount of pain medications-she expressed some concerns about this but decided to continue with the medications-since her last visit she had medical procedures  "completed and per the patient no new information about her health condition and/or treatment recommendations-she has been having some difficulty with eating and she stated \"I'm working hard at eating\" and she reported that she has gained some weight-she reported that her aunt has been having the same medical symptoms that the patient is struggling with so she has been keeping an eye on her aunt's condition-she talked about having the anxiety of \"where is this all going\" concerning her medical condition-she talked about her anxiety of not knowing.            Progress on Treatment Objective(s) / Homework:  Minimal progress - PREPARATION (Decided to change - considering how); Intervened by negotiating a change plan and determining options / strategies for behavior change, identifying triggers, exploring social supports, and working towards setting a date to begin behavior change    Motivational Interviewing    MI Intervention: Expressed Empathy/Understanding, Supported Autonomy, Collaboration, Evocation, Permission to raise concern or advise, Open-ended questions, Reflections: simple and complex, Rolled with resistance: Simple reflection, Complex reflection and Evoked patient agenda and Change talk (evoked)     Change Talk Expressed by the Patient: Desire to change Ability to change Reasons to change Need to change Committment to change Activation Taking steps    Provider Response to Change Talk: E - Evoked more info from patient about behavior change, A - Affirmed patient's thoughts, decisions, or attempts at behavior change, R - Reflected patient's change talk and S - Summarized patient's change talk statements      Care Plan review completed: No    Medication Review:  No changes to current psychiatric medication(s)    Medication Compliance:  NA    Changes in Health Issues:   Yes: Pain, Associated Psychological Distress    Chemical Use Review:   Substance Use: Chemical use reviewed, no active concerns identified "      Tobacco Use: No current tobacco use.      Assessment: Current Emotional / Mental Status (status of significant symptoms):  Risk status (Self / Other harm or suicidal ideation)  Patient denies a history of suicidal ideation, suicide attempts, self-injurious behavior, homicidal ideation, homicidal behavior and and other safety concerns  Patient denies current fears or concerns for personal safety.  Patient denies current or recent suicidal ideation or behaviors.  Patient denies current or recent homicidal ideation or behaviors.  Patient denies current or recent self injurious behavior or ideation.  Patient denies other safety concerns.  A safety and risk management plan has not been developed at this time, however patient was encouraged to call [place] [id number] should there be a change in any of these risk factors.    Appearance:   Appropriate   Eye Contact:   Good   Psychomotor Behavior: Normal   Attitude:   Cooperative   Orientation:   All  Speech   Rate / Production: Normal    Volume:  Normal   Mood:    Anxious  Normal  Affect:    Appropriate   Thought Content:  Clear   Thought Form:  Coherent  Goal Directed  Logical   Insight:    Good     Diagnoses:  1. Anxiety        Collateral Reports Completed:  Not Applicable    Plan: (Homework, other):  Patient was given information about behavioral services and encouraged to schedule a follow up appointment with the clinic [facility] as needed.  She was also given information about mental health symptoms and treatment options .  CD Recommendations: No indications of CD issues.  CHERY Benedict, [facility] [place]      ______________________________________________________________________

## 2017-02-27 ENCOUNTER — TELEPHONE (OUTPATIENT)
Dept: FAMILY MEDICINE | Facility: CLINIC | Age: 57
End: 2017-02-27

## 2017-02-27 DIAGNOSIS — G89.4 CHRONIC PAIN SYNDROME: ICD-10-CM

## 2017-02-27 RX ORDER — HYDROCODONE BITARTRATE AND ACETAMINOPHEN 7.5; 325 MG/15ML; MG/15ML
15 SOLUTION ORAL 3 TIMES DAILY PRN
Qty: 473 ML | Refills: 0 | Status: CANCELLED | OUTPATIENT
Start: 2017-03-03

## 2017-02-27 NOTE — TELEPHONE ENCOUNTER
Incoming pt requesting a hard copy of script below be mailed to pharmacy. Pt would also like PCP to know that she currently weighs 104.4.     HYDROcodone-acetaminophen (LORTAB) 7.5-325 MG/15ML      Last Written Prescription Date: 03/03/17  Last Fill Quantity: 473mL,  # refills: 0   [facility] Office Visit with [facility] prescribing provider: 02/02/17                                         Next 5 appointments (look out 90 days)     Mar 16, 2017  4:00 PM CDT   Return Visit with BOBBY Balbuena CNP   [facility] ([facility])    [address]  [address]  [facility] [address]   [phone]            Mar 16, 2017  4:00 PM CDT   Return Visit with CHERY Bahena   [facility] ([facility])    [address]  [address]  [facility] [address]   [phone]

## 2017-02-27 NOTE — TELEPHONE ENCOUNTER
Controlled Substance Refill Request for Marinol    Last refill: 9/19/16, 60 tablets     Last clinic visit: 2/2/17    Next appt: 3/16/17    Controlled substance agreement on file: No.    Documentation in problem list reviewed:  No    Processing:  Fax Rx to pt's pharmacy    [place] reviewed:  reviewed- no concerns  [place]:  [email or web address]      Discussed with patient that this medication has been discontinued and is historical on her list and that her provider is out of town so she would need an OV for a refill, pt states that she started taking this medication again last week Wednesday and that it is helping with her nausea and appetite and she is up to 104.4 pounds (last weight on file 102), pt states she will just wait until Shanda is back in clinic for provider to evaluate refill.  Bhumi Sanchez RN

## 2017-03-09 ENCOUNTER — TRANSFERRED RECORDS (OUTPATIENT)
Dept: HEALTH INFORMATION MANAGEMENT | Facility: CLINIC | Age: 57
End: 2017-03-09

## 2017-03-10 ENCOUNTER — TRANSFERRED RECORDS (OUTPATIENT)
Dept: HEALTH INFORMATION MANAGEMENT | Facility: CLINIC | Age: 57
End: 2017-03-10

## 2017-03-16 ENCOUNTER — OFFICE VISIT (OUTPATIENT)
Dept: BEHAVIORAL HEALTH | Facility: CLINIC | Age: 57
End: 2017-03-16
Payer: COMMERCIAL

## 2017-03-16 ENCOUNTER — OFFICE VISIT (OUTPATIENT)
Dept: FAMILY MEDICINE | Facility: CLINIC | Age: 57
End: 2017-03-16
Payer: COMMERCIAL

## 2017-03-16 VITALS
OXYGEN SATURATION: 97 % | DIASTOLIC BLOOD PRESSURE: 77 MMHG | WEIGHT: 104.5 LBS | HEART RATE: 87 BPM | TEMPERATURE: 98.3 F | SYSTOLIC BLOOD PRESSURE: 103 MMHG | RESPIRATION RATE: 12 BRPM | BODY MASS INDEX: 17.39 KG/M2

## 2017-03-16 DIAGNOSIS — F41.9 ANXIETY: Primary | ICD-10-CM

## 2017-03-16 DIAGNOSIS — Z79.891 ENCOUNTER FOR LONG-TERM USE OF OPIATE ANALGESIC: ICD-10-CM

## 2017-03-16 DIAGNOSIS — J44.0 CHRONIC OBSTRUCTIVE PULMONARY DISEASE WITH ACUTE LOWER RESPIRATORY INFECTION (H): Primary | ICD-10-CM

## 2017-03-16 DIAGNOSIS — G89.4 CHRONIC PAIN SYNDROME: ICD-10-CM

## 2017-03-16 PROCEDURE — 90832 PSYTX W PT 30 MINUTES: CPT | Performed by: SOCIAL WORKER

## 2017-03-16 PROCEDURE — 99215 OFFICE O/P EST HI 40 MIN: CPT | Performed by: NURSE PRACTITIONER

## 2017-03-16 RX ORDER — OXYCODONE HYDROCHLORIDE 5 MG/1
TABLET ORAL
COMMUNITY
Start: 2017-03-10 | End: 2017-03-30

## 2017-03-16 NOTE — PROGRESS NOTES
"  SUBJECTIVE:                                                    Alka Mcfadden is a 56 year old female who presents to clinic today for the following health issues:    Back Pain Follow Up   Long history of pain, pain treatment, and engagement with the [facility].  When Alka began seeing this provider she had been using Fentanyl patches for 9 years going back to 2007.  She took herself off all of her pain medications back in November of 2016, this resulted in a hospitalization, acute abdominal pain, Upper EGD (that revealed rasmussen's esophagus) and Colonoscopy (benign findings), CT of the abdomen with evidence of an atrophied pancrease.  No clear cause of abdominal pain identified.  Some concern for narcotic withdrawal in the context of suddenly stopping fentanyl after years of fentanyl use.       Description:   Location of pain: bilateral  Character of pain: sharp, gnawing, burning and constant  Pain radiation: Does not radiate and radiates into the left buttocks  Since last visit, pain is: Worsened, however, she has not been on any medication for pain since mid December on a regular basis  New numbness or weakness in legs, not attributed to pain: no     Intensity: Currently \"200/10 \"    History:   Pain interferes with job: Not applicable  Therapies tried with relief: chiropractor, heat, muscle relaxants, opioids and rest  Therapies tried without relief: massage, NSAIDs, Physical Therapy and steroid injection    Accompanying Signs & Symptoms:  Risk of Fracture: Osteoporosis  Risk of Cauda Equina: None  Risk of Infection: None  Risk of Cancer: Unexplained weight loss      Patient would like to do something about her pain. She is open to going back on the patches she was on before. She says that she cannot live like this, she is suffering from her pain. She also thinks that if she can control her pain, she would be able to eat more.  She reports that the pain is so severe, she does not even want to get up " "to go to the bathroom.   Sleep  She reports that she cannot sleep because of her back pain. She tries to sleep but the quality of her sleep is \"horrible\". She reports that sleeping pills do help but the positioning is really difficult for her and so she wakes up often. She has only taken \"a few\" sleeping pills recently.     Recent hospitalization  Alka went to [place] last week for an evaluation of her pain and weight loss.  When she arrived she was very ill with pneumonia and was hospitalized for 4 days, given steroids and antibiotics. She felt like she was going to die because she felt so bad. Her lungs and breathing feel better today. Now, she is trying to deal with the pain. \"The violin is still playing but she needs it to be a base\" in regards to pain. She is no longer acutely ill however her stomach pain still occurs. She also has back and spinal pain.     Upcoming Appointment  Patient reports that her aunt has breast cancer. Her mom did too. She has a mammogram on Monday. She reports that she will donate her body to science after she dies.  She is very concerned that her back pain is covering breast cancer pain.     Problem list and histories reviewed & adjusted, as indicated.  Additional history: as documented    Patient Active Problem List   Diagnosis     Ankylosing spondylitis (H)     CRPS (complex regional pain syndrome), lower limb     Encounter for long-term use of opiate analgesic     Hyperalgesia      ### Cataract, OU     Dry eyes     Hyperlipidemia LDL goal <130     Constipation     Insomnia     Tobacco use disorder     Open wound of knee, leg, and ankle     Anxiety     Chronic pain syndrome     Abdominal pain, right upper quadrant     Suicidal ideation     Nausea     Unexplained weight loss     Vaginal bleeding     Abdominal pain     Pancreatic atrophy     Haney's esophagus determined by biopsy     Past Surgical History   Procedure Laterality Date     Back surgery  1985/1990/1996     Joint " replacement  1985     Arthroplasty revision hip  2005, 2015     Endoscopic ultrasound upper gastrointestinal tract (gi) N/A 1/5/2017     Procedure: ENDOSCOPIC ULTRASOUND, ESOPHAGOSCOPY / UPPER GASTROINTESTINAL TRACT (GI);  Surgeon: Esteban Mortensen MD;  Location: [facility] OR     Esophagoscopy, gastroscopy, duodenoscopy (egd), combined N/A 1/5/2017     Procedure: COMBINED ESOPHAGOSCOPY, GASTROSCOPY, DUODENOSCOPY (EGD);  Surgeon: Esteban Mortensen MD;  Location: [facility] OR       Social History   Substance Use Topics     Smoking status: Light Tobacco Smoker     Types: Cigarettes     Smokeless tobacco: Never Used      Comment: 3-4 cigarettes per week.     Alcohol use No     History reviewed. No pertinent family history.      Current Outpatient Prescriptions   Medication Sig Dispense Refill     ADVAIR DISKUS 250-50 MCG/DOSE diskus inhaler INHALE 1 PUFF PO Q 12 HOURS .TK ON A SCHEDULED BASIS FOR COPD  0     oxyCODONE (ROXICODONE) 5 MG IR tablet        diazepam (VALIUM) 10 MG tablet Take 0.5-1 tablets (5-10 mg) by mouth every 12 hours as needed for anxiety or sleep 60 tablet 0     ciprofloxacin (CIPRO) 500 MG tablet Take 1 tablet (500 mg) by mouth 2 times daily 6 tablet 0     omeprazole (PRILOSEC) 40 MG capsule Take 1 capsule (40 mg) by mouth daily Take 30-60 minutes before a meal. 30 capsule 1     temazepam (RESTORIL) 30 MG capsule Take 1 capsule (30 mg) by mouth nightly as needed for sleep 30 capsule 0     Allergies   Allergen Reactions     Amoxicillin-Pot Clavulanate Rash     Augmentin Rash     Amitriptyline      Had reaction while on this and vistaril; was just not herself.     Hydroxyzine Other (See Comments)     Had reaction when took this along with amitriptyline.     Vistaril      Had reaction when took this along with amitriptyline.     BP Readings from Last 3 Encounters:   03/16/17 103/77   02/02/17 136/87   01/05/17 129/64    Wt Readings from Last 3 Encounters:   03/16/17 47.4 kg (104 lb 8 oz)   02/02/17 46.3 kg  (102 lb)   01/05/17 47.2 kg (104 lb 0.9 oz)            Labs reviewed in EPIC    Reviewed and updated as needed this visit by clinical staff  Reviewed and updated as needed this visit by Provider    ROS:  Constitutional, HEENT, cardiovascular, pulmonary, GI, , musculoskeletal, neuro, skin, endocrine and psych systems are negative, except as otherwise noted.    This document serves as a record of the services and decisions personally performed and made by Shanda Vo CNP. It was created on her behalf by Faith Madsen, a trained medical scribe. The creation of this document is based on the provider's statements to the medical scribe.  Faith Madsen 4:28 PM 3/16/2017   OBJECTIVE:                                                    /77 (BP Location: Left arm, Patient Position: Chair, Cuff Size: Adult Regular)  Pulse 87  Temp 98.3  F (36.8  C) (Oral)  Resp 12  Wt 47.4 kg (104 lb 8 oz)  SpO2 97%  BMI 17.39 kg/m2  Body mass index is 17.39 kg/(m^2).  GENERAL: healthy, alert and no distress  EYES: Eyes grossly normal to inspection, PERRL and conjunctivae and sclerae normal  NECK: no adenopathy, no asymmetry, masses, or scars and thyroid normal to palpation  RESP: lungs Faint wheezes through out otherwise clear to auscultation - no rales, rhonchi  CV: regular rate and rhythm, normal S1 S2, no S3 or S4, no murmur, click or rub, no peripheral edema and peripheral pulses strong  ABDOMEN: thin, pain and pressure with palpation of upper and lower abdomen, especially right/left upper quad.    MS: Hardware from previous spinal surgery visible in outline through the skin on back, no edema  MENTAL HEALTH  Appearance:  awake, alert and adequately groomed  Attitude:  cooperative  Eye Contact:  good  Mood:  engaged  Affect: depressed  Speech:  clear, coherent  Psychomotor Behavior:  no evidence of tardive dyskinesia, dystonia, or tics  Thought Process:  logical and linear  Associations:  no loose associations  Thought  Content:  no evidence of suicidal ideation or homicidal ideation and no evidence of psychotic thought  Insight:  good  Judgment:  limited  Oriented to:  time, person, and place  Attention Span and Concentration:  normal  Recent and Remote Memory:  intact  Fund of Knowledge: appropriate  Muscle Strength and Tone: normal     Diagnostic Test Results:  none      ASSESSMENT/PLAN:                                                    (J44.0) Chronic obstructive pulmonary disease with acute lower respiratory infection (H)  (primary encounter diagnosis)  Comment: Completing treatment for pneumonia in context of COPD  Plan: ADVAIR DISKUS 250-50 MCG/DOSE diskus inhaler        To return to clinic in 1-2 weeks when infection completely resolved for baseline spirometry and creation of a COPD action plan    (G89.4) Chronic pain syndrome  Comment: Debating returning to the duragesic patches or some other long acting pain medications. She has very limited body fat, Duragesic may not be the best option for her  Plan: oxyCODONE (ROXICODONE) 5 MG IR tablet       Continue on short acting Oxycodone at this time.  To reevaluate at next visit.     (Z79.891) Encounter for long-term use of opiate analgesic  Comment: To continue with Short acting Oxycodone at this time.  No antiinflammatory being taken at this time.    Plan: oxyCODONE (ROXICODONE) 5 MG IR tablet        Reevaluate at next visit. Offer visit with Dr Alfredo.           MEDICATIONS:   Orders Placed This Encounter   Medications     ADVAIR DISKUS 250-50 MCG/DOSE diskus inhaler     Sig: INHALE 1 PUFF PO Q 12 HOURS .TK ON A SCHEDULED BASIS FOR COPD     Refill:  0     oxyCODONE (ROXICODONE) 5 MG IR tablet     Medications Discontinued During This Encounter   Medication Reason     HYDROcodone-acetaminophen (LORTAB) 7.5-325 MG/15ML solution Stopped by Patient          - Continue other medications without change  CONSULTATION/REFERRAL being considered to Pooja Alfredo MD pain specialist for  [facility]  FUTURE APPOINTMENTS:       - Follow-up visit in 2-3 weeks.  Greater than 40 minutes was spent with this patient, with greater than 50 % in counselling about COPD, Pneumonia, Pain management.  BOBBY Butcher [facility] PRIMARY CARE  The information in this document, created by the medical scribe, Faith Madsen, for me, accurately reflects the services I personally performed and the decisions made by me. I have reviewed and approved this document for accuracy prior to leaving the patient care area.

## 2017-03-16 NOTE — NURSING NOTE
"Chief Complaint   Patient presents with     RECHECK       Initial /77 (BP Location: Left arm, Patient Position: Chair, Cuff Size: Adult Regular)  Pulse 87  Temp 98.3  F (36.8  C) (Oral)  Resp 12  Wt 104 lb 8 oz (47.4 kg)  SpO2 97%  BMI 17.39 kg/m2 Estimated body mass index is 17.39 kg/(m^2) as calculated from the following:    Height as of 1/5/17: 5' 5\" (1.651 m).    Weight as of this encounter: 104 lb 8 oz (47.4 kg).  Medication Reconciliation: complete     Oswaldo Santiago MA    "

## 2017-03-16 NOTE — MR AVS SNAPSHOT
After Visit Summary   3/16/2017    Alka Mcfadden    MRN: [id number]           Patient Information     Date Of Birth          1960        Visit Information        Provider Department      3/16/2017 4:00 PM Shanda Vo APRN CNP [facility]        Today's Diagnoses     Chronic pain syndrome    -  1       Follow-ups after your visit        Your next 10 appointments already scheduled     Mar 20, 2017  3:00 PM CDT   MA SCREENING DIGITAL BILATERAL with [id number]   [facility] ([facility])    [address] [place] [address], [address]  [facility] [address] [phone]           Do not use any powder, lotion or deodorant under your arms or on your breast. If you do, we will ask you to remove it before your exam.  Wear comfortable, two-piece clothing.  If you have any allergies, tell your care team.  Bring any previous mammograms from other facilities or have them mailed to the breast center.              Who to contact     If you have questions or need follow up information about today's clinic visit or your schedule please contact [facility] PRIMARY CARE directly at [phone].  Normal or non-critical lab and imaging results will be communicated to you by MyChart, letter or phone within 4 business days after the clinic has received the results. If you do not hear from us within 7 days, please contact the clinic through Tribotekhart or phone. If you have a critical or abnormal lab result, we will notify you by phone as soon as possible.  Submit refill requests through Yappe or call your pharmacy and they will forward the refill request to us. Please allow 3 business days for your refill to be completed.          Additional Information About Your Visit        MyChart Information     Yappe lets you send messages to your doctor, view your test results, renew your prescriptions, schedule appointments and more. To sign up, go to  [email or web address] . Click on \"Log in\" on the left side of the screen, which will take you to the Welcome page. Then click on \"Sign up Now\" on the right side of the page.     You will be asked to enter the access code listed below, as well as some personal information. Please follow the directions to create your username and password.     Your access code is: [email or web address]  Expires: 3/26/2017  4:42 PM     Your access code will  in 90 days. If you need help or a new code, please call your [place] clinic or [phone].        Care EveryWhere ID     This is your Care EveryWhere ID. This could be used by other organizations to access your [place] medical records  [id number]        Your Vitals Were     Pulse Temperature Respirations Pulse Oximetry BMI (Body Mass Index)       87 98.3  F (36.8  C) (Oral) 12 97% 17.39 kg/m2        Blood Pressure from Last 3 Encounters:   17 103/77   17 136/87   17 129/64    Weight from Last 3 Encounters:   17 104 lb 8 oz (47.4 kg)   17 102 lb (46.3 kg)   17 104 lb 0.9 oz (47.2 kg)              Today, you had the following     No orders found for display       Primary Care Provider Office Phone # Fax #    BOBBY Balbuena [phone]       [facility] PRIMARY CARE [address] [place] [id number]        Thank you!     Thank you for choosing [facility] PRIMARY CARE  for your care. Our goal is always to provide you with excellent care. Hearing back from our patients is one way we can continue to improve our services. Please take a few minutes to complete the written survey that you may receive in the mail after your visit with us. Thank you!             Your Updated Medication List - Protect others around you: Learn how to safely use, store and throw away your medicines at www.disposemymeds.org.          This list is accurate as of: 3/16/17  5:10 PM.  Always use your most recent med " list.                   Brand Name Dispense Instructions for use    ADVAIR DISKUS 250-50 MCG/DOSE diskus inhaler   Generic drug:  fluticasone-salmeterol      INHALE 1 PUFF PO Q 12 HOURS .TK ON A SCHEDULED BASIS FOR COPD       ciprofloxacin 500 MG tablet    CIPRO    6 tablet    Take 1 tablet (500 mg) by mouth 2 times daily       diazepam 10 MG tablet    VALIUM    60 tablet    Take 0.5-1 tablets (5-10 mg) by mouth every 12 hours as needed for anxiety or sleep       omeprazole 40 MG capsule    priLOSEC    30 capsule    Take 1 capsule (40 mg) by mouth daily Take 30-60 minutes before a meal.       oxyCODONE 5 MG IR tablet    ROXICODONE         temazepam 30 MG capsule    RESTORIL    30 capsule    Take 1 capsule (30 mg) by mouth nightly as needed for sleep

## 2017-03-16 NOTE — MR AVS SNAPSHOT
After Visit Summary   3/16/2017    Alka Mcfadden    MRN: [id number]           Patient Information     Date Of Birth          1960        Visit Information        Provider Department      3/16/2017 4:00 PM Vaughn Eldridge LICSW [facility]        Today's Diagnoses     Anxiety    -  1       Follow-ups after your visit        Your next 10 appointments already scheduled     Mar 30, 2017  3:30 PM CDT   SHORT with Judy Ernandez   [facility] ([facility])    [address] [place] [address]  [address] [place] [id number]   [phone]            Mar 30, 2017  4:00 PM CDT   Return Visit with BOBBY Balbuena CNP   [facility]    [address] [place] [address]  [address] [place] [id number] [phone]            Apr 04, 2017  3:20 PM CDT   New Visit with Pooja Alfredo MD   [facility]    [address] [place] [address]  [address] [place] [id number] [phone]              Who to contact     If you have questions or need follow up information about today's clinic visit or your schedule please contact [facility] directly at [phone].  Normal or non-critical lab and imaging results will be communicated to you by MyChart, letter or phone within 4 business days after the clinic has received the results. If you do not hear from us within 7 days, please contact the clinic through MyChart or phone. If you have a critical or abnormal lab result, we will notify you by phone as soon as possible.  Submit refill requests through SocialVest or call your pharmacy and they will forward the refill request to us. Please allow 3 business days for your refill to be completed.          Additional Information About Your Visit        [facility]  "Information     SiteMinder lets you send messages to your doctor, view your test results, renew your prescriptions, schedule appointments and more. To sign up, go to [email or web address] . Click on \"Log in\" on the left side of the screen, which will take you to the Welcome page. Then click on \"Sign up Now\" on the right side of the page.     You will be asked to enter the access code listed below, as well as some personal information. Please follow the directions to create your username and password.     Your access code is: [email or web address]  Expires: 3/26/2017  4:42 PM     Your access code will  in 90 days. If you need help or a new code, please call your [place] clinic or [phone].        Care EveryWhere ID     This is your Care EveryWhere ID. This could be used by other organizations to access your [place] medical records  [id number]         Blood Pressure from Last 3 Encounters:   17 103/77   17 136/87   17 129/64    Weight from Last 3 Encounters:   17 104 lb 8 oz (47.4 kg)   17 102 lb (46.3 kg)   17 104 lb 0.9 oz (47.2 kg)              Today, you had the following     No orders found for display       Primary Care Provider Office Phone # Fax #    Shanda Crespokayla Vo APRGARRICK [phone]       [facility] PRIMARY CARE [address] [place] [id number]        Thank you!     Thank you for choosing [facility] PRIMARY CARE  for your care. Our goal is always to provide you with excellent care. Hearing back from our patients is one way we can continue to improve our services. Please take a few minutes to complete the written survey that you may receive in the mail after your visit with us. Thank you!             Your Updated Medication List - Protect others around you: Learn how to safely use, store and throw away your medicines at www.disposemymeds.org.          This list is accurate as of: 3/16/17 11:59 PM.  Always use your " most recent med list.                   Brand Name Dispense Instructions for use    ADVAIR DISKUS 250-50 MCG/DOSE diskus inhaler   Generic drug:  fluticasone-salmeterol      INHALE 1 PUFF PO Q 12 HOURS .TK ON A SCHEDULED BASIS FOR COPD       ciprofloxacin 500 MG tablet    CIPRO    6 tablet    Take 1 tablet (500 mg) by mouth 2 times daily       diazepam 10 MG tablet    VALIUM    60 tablet    Take 0.5-1 tablets (5-10 mg) by mouth every 12 hours as needed for anxiety or sleep       omeprazole 40 MG capsule    priLOSEC    30 capsule    Take 1 capsule (40 mg) by mouth daily Take 30-60 minutes before a meal.       oxyCODONE 5 MG IR tablet    ROXICODONE         temazepam 30 MG capsule    RESTORIL    30 capsule    Take 1 capsule (30 mg) by mouth nightly as needed for sleep

## 2017-03-21 ENCOUNTER — TELEPHONE (OUTPATIENT)
Dept: FAMILY MEDICINE | Facility: CLINIC | Age: 57
End: 2017-03-21

## 2017-03-21 DIAGNOSIS — J44.0 CHRONIC OBSTRUCTIVE PULMONARY DISEASE WITH ACUTE LOWER RESPIRATORY INFECTION (H): Primary | ICD-10-CM

## 2017-03-21 NOTE — TELEPHONE ENCOUNTER
Incoming call from pt stating that she was informed by a respiratory specialist at the [facility] that she needs a COPD back up plan. Pt would like steroids, antibiotics, and an Albuterol inhaler.     Deloris Leon

## 2017-03-22 PROBLEM — J44.9 CHRONIC OBSTRUCTIVE PULMONARY DISEASE (H): Status: ACTIVE | Noted: 2017-03-22

## 2017-03-22 RX ORDER — ALBUTEROL SULFATE 90 UG/1
2 AEROSOL, METERED RESPIRATORY (INHALATION) EVERY 4 HOURS PRN
Qty: 1 INHALER | Refills: 3 | Status: SHIPPED | OUTPATIENT
Start: 2017-03-22

## 2017-03-22 RX ORDER — PREDNISONE 20 MG/1
40 TABLET ORAL DAILY
Qty: 10 TABLET | Refills: 0 | Status: SHIPPED | OUTPATIENT
Start: 2017-03-22 | End: 2017-03-30

## 2017-03-22 RX ORDER — LEVOFLOXACIN 500 MG/1
500 TABLET, FILM COATED ORAL DAILY
Qty: 14 TABLET | Refills: 0 | Status: SHIPPED | OUTPATIENT
Start: 2017-03-22 | End: 2017-03-30

## 2017-03-22 NOTE — TELEPHONE ENCOUNTER
"I am happy to prescribe medications for management of pneumonia.  I am recommending that we have a careful discussion with the MTM (Judy) around how to use such \"back up\" medications.  I am willing to prescribe these medications.  I would then like her to bring them with her to the next appointment and we can go over a plan for use.  Medications such as the antibiotics and steroids are powerful medications and should only be used when indicated.  Coming to a clear understanding of appropriate use is critical.   Please have her next appointment scheduled when Judy is available.    Shanda Vo, CNP, APNP  [facility] Primary Care [facility]    "

## 2017-03-22 NOTE — PROGRESS NOTES
[facility] - Integrated Primary Care   March 16, 2017      Behavioral Health Clinician Progress Note    Patient Name: Alka Mcfadden           Service Type: Individual      Service Location:   Face to Face in Clinic     Session Start Time: 4:26pm  Session End Time: 5pm      Session Length: 16 - 37      Attendees: Patient and Spouse / Significant Other    Visit Activities (Refresh list every visit): [facility]    Diagnostic Assessment Date: not completed yet  Treatment Plan Review Date: not completed yet  See Flowsheets for today's PHQ-9 and JAMAL-7 results  Previous PHQ-9:   PHQ-9 SCORE 12/8/2015 7/15/2016 12/5/2016   Total Score - - -   Total Score - - -   Total Score 0 0 0     Previous JAMAL-7:   JAMAL-7 SCORE 12/8/2015 7/15/2016 12/5/2016   Total Score - - -   Total Score 0 0 0   Total Score - - -       KENDRA LEVEL:  No flowsheet data found.    DATA  Extended Session (60+ minutes): No  Interactive Complexity: No  Crisis: No    Treatment Objective(s) Addressed in This Session:  Target Behavior(s): disease management/lifestyle changes mental health    Depressed Mood: Increase interest, engagement, and pleasure in doing things  Decrease frequency and intensity of feeling down, depressed, hopeless  Identify negative self-talk and behaviors: challenge core beliefs, myths, and actions  Improve concentration, focus, and mindfulness in daily activities   Anxiety: will experience a reduction in anxiety, will develop more effective coping skills to manage anxiety symptoms, will develop healthy cognitive patterns and beliefs and will increase ability to function adaptively  Psychological distress related to Pain  Psychological distress related to Chronic Disease Management    Current Stressors / Issues:  The patient reported that the aunt that was having similar health symptoms as the patient was diagnosed with cancer so the patient is concerned that she may have the same diagnosis-recenty the patient had acute health issues  "and the result was she was admitted to hospital last week-her focus of this visit to [facility] was to address her chronic intense pain-she talked about realizing that her symptoms can get worse as a result of her recent acute health condition-she stated that since she was discharged from the hospital her breathing has improved but she continues to have the same sensations in her stomach and her pain is \"off the charts.\"  She was very focused on her pain during the visit even when this writer made the direct attempt to refocus the visit-it was reflected to the patient and she agreed that \"the pain trumps everything else event sleeping.\"  The patient has not been taking her sleeping medications as prescribed-she has not been getting quality sleep and this may be due to the pain and not taking her medications regularly.         Progress on Treatment Objective(s) / Homework:  No improvement - PREPARATION (Decided to change - considering how); Intervened by negotiating a change plan and determining options / strategies for behavior change, identifying triggers, exploring social supports, and working towards setting a date to begin behavior change    Motivational Interviewing    MI Intervention: Expressed Empathy/Understanding, Supported Autonomy, Collaboration, Evocation, Permission to raise concern or advise, Open-ended questions, Reflections: simple and complex, Rolled with resistance: Simple reflection, Complex reflection and Evoked patient agenda and Change talk (evoked)     Change Talk Expressed by the Patient: Desire to change Ability to change Reasons to change Need to change Committment to change Activation Taking steps    Provider Response to Change Talk: E - Evoked more info from patient about behavior change, A - Affirmed patient's thoughts, decisions, or attempts at behavior change, R - Reflected patient's change talk and S - Summarized patient's change talk statements      Care Plan review completed: " No    Medication Review:  No changes to current psychiatric medication(s)    Medication Compliance:  NA    Changes in Health Issues:   Yes: Pain, Associated Psychological Distress    Chemical Use Review:   Substance Use: Chemical use reviewed, no active concerns identified      Tobacco Use: No current tobacco use.      Assessment: Current Emotional / Mental Status (status of significant symptoms):  Risk status (Self / Other harm or suicidal ideation)  Patient denies a history of suicidal ideation, suicide attempts, self-injurious behavior, homicidal ideation, homicidal behavior and and other safety concerns  Patient denies current fears or concerns for personal safety.  Patient denies current or recent suicidal ideation or behaviors.  Patient denies current or recent homicidal ideation or behaviors.  Patient denies current or recent self injurious behavior or ideation.  Patient denies other safety concerns.  A safety and risk management plan has not been developed at this time, however patient was encouraged to call [place] [phone] should there be a change in any of these risk factors.    Appearance:   Appropriate   Eye Contact:   Good   Psychomotor Behavior: Normal   Attitude:   Cooperative   Orientation:   All  Speech   Rate / Production: Normal    Volume:  Normal   Mood:    Normal  Affect:    Appropriate   Thought Content:  Clear   Thought Form:  Coherent  Goal Directed  Logical   Insight:    Good     Diagnoses:  1. Anxiety        Collateral Reports Completed:  Not Applicable    Plan: (Homework, other):  Patient was given information about behavioral services and encouraged to schedule a follow up appointment with the clinic [facility] as needed.  She was also given information about mental health symptoms and treatment options .  CD Recommendations: No indications of CD issues.  CHERY Benedict, [facility]      ______________________________________________________________________

## 2017-03-30 ENCOUNTER — OFFICE VISIT (OUTPATIENT)
Dept: BEHAVIORAL HEALTH | Facility: CLINIC | Age: 57
End: 2017-03-30
Payer: COMMERCIAL

## 2017-03-30 ENCOUNTER — OFFICE VISIT (OUTPATIENT)
Dept: FAMILY MEDICINE | Facility: CLINIC | Age: 57
End: 2017-03-30
Payer: COMMERCIAL

## 2017-03-30 VITALS
RESPIRATION RATE: 12 BRPM | HEART RATE: 88 BPM | WEIGHT: 102.5 LBS | BODY MASS INDEX: 17.06 KG/M2 | DIASTOLIC BLOOD PRESSURE: 71 MMHG | SYSTOLIC BLOOD PRESSURE: 134 MMHG | OXYGEN SATURATION: 98 % | TEMPERATURE: 98.2 F

## 2017-03-30 DIAGNOSIS — Z01.419 ENCOUNTER FOR GYNECOLOGICAL EXAMINATION WITHOUT ABNORMAL FINDING: ICD-10-CM

## 2017-03-30 DIAGNOSIS — G89.4 CHRONIC PAIN SYNDROME: Primary | ICD-10-CM

## 2017-03-30 DIAGNOSIS — G89.4 CHRONIC PAIN SYNDROME: ICD-10-CM

## 2017-03-30 DIAGNOSIS — F41.9 ANXIETY: Primary | ICD-10-CM

## 2017-03-30 DIAGNOSIS — M45.9 ANKYLOSING SPONDYLITIS (H): ICD-10-CM

## 2017-03-30 DIAGNOSIS — Z79.891 ENCOUNTER FOR LONG-TERM USE OF OPIATE ANALGESIC: ICD-10-CM

## 2017-03-30 PROCEDURE — 87210 SMEAR WET MOUNT SALINE/INK: CPT | Performed by: NURSE PRACTITIONER

## 2017-03-30 PROCEDURE — 99214 OFFICE O/P EST MOD 30 MIN: CPT | Performed by: NURSE PRACTITIONER

## 2017-03-30 PROCEDURE — 90834 PSYTX W PT 45 MINUTES: CPT | Performed by: SOCIAL WORKER

## 2017-03-30 PROCEDURE — G0145 SCR C/V CYTO,THINLAYER,RESCR: HCPCS | Performed by: NURSE PRACTITIONER

## 2017-03-30 PROCEDURE — 87624 HPV HI-RISK TYP POOLED RSLT: CPT | Performed by: NURSE PRACTITIONER

## 2017-03-30 RX ORDER — TEMAZEPAM 30 MG
30 CAPSULE ORAL
Qty: 30 CAPSULE | Refills: 0 | Status: SHIPPED | OUTPATIENT
Start: 2017-03-30 | End: 2017-04-29

## 2017-03-30 RX ORDER — OXYCODONE HYDROCHLORIDE 10 MG/1
10 TABLET ORAL EVERY 6 HOURS PRN
Qty: 45 TABLET | Refills: 0 | Status: SHIPPED | OUTPATIENT
Start: 2017-03-30 | End: 2017-04-21

## 2017-03-30 NOTE — LETTER
April 6, 2017    Alka Mcfadden  [address] [place] [address]    Dear Alka,  We are happy to inform you that your PAP smear result from 03/30/17 is normal.  We are now able to do a follow up test on PAP smears. The DNA test is for HPV (Human Papilloma Virus). Cervical cancer is closely linked with certain types of HPV. Your result showed no evidence of high risk HPV.  Therefore we recommend you return in 3 years for your next pap smear and HPV test.  You will still need to return to the clinic every year for an annual exam and other preventive tests.  Please contact the clinic at [phone] with any questions.  Sincerely,    BOBBY Butcher CNP/[facility]

## 2017-03-30 NOTE — MR AVS SNAPSHOT
"              After Visit Summary   3/30/2017    Alka Mcfadden    MRN: [id number]           Patient Information     Date Of Birth          1960        Visit Information        Provider Department      3/30/2017 4:00 PM Cherelle Aguilera [facility]        Today's Diagnoses     Anxiety    -  1    Chronic pain syndrome           Follow-ups after your visit        Your next 10 appointments already scheduled     Apr 04, 2017  3:20 PM CDT   New Visit with Pooja Alfredo MD   [facility] ([facility])    [address] [place] [address]  [address]  [facility] [id number] [phone]              Who to contact     If you have questions or need follow up information about today's clinic visit or your schedule please contact [facility] directly at [phone].  Normal or non-critical lab and imaging results will be communicated to you by MyChart, letter or phone within 4 business days after the clinic has received the results. If you do not hear from us within 7 days, please contact the clinic through MyChart or phone. If you have a critical or abnormal lab result, we will notify you by phone as soon as possible.  Submit refill requests through rSmart or call your pharmacy and they will forward the refill request to us. Please allow 3 business days for your refill to be completed.          Additional Information About Your Visit        MyChart Information     rSmart lets you send messages to your doctor, view your test results, renew your prescriptions, schedule appointments and more. To sign up, go to [email or web address] . Click on \"Log in\" on the left side of the screen, which will take you to the Welcome page. Then click on \"Sign up Now\" on the right side of the page.     You will be asked to enter the access code listed below, as well as some personal information. Please follow the directions to create your " username and password.     Your access code is: [email or web address]  Expires: 2017  4:56 PM     Your access code will  in 90 days. If you need help or a new code, please call your [facility] or [phone].        Care EveryWhere ID     This is your Care EveryWhere ID. This could be used by other organizations to access your [facility] medical records  [id number]         Blood Pressure from Last 3 Encounters:   17 134/71   17 103/77   17 136/87    Weight from Last 3 Encounters:   17 102 lb 8 oz (46.5 kg)   17 104 lb 8 oz (47.4 kg)   17 102 lb (46.3 kg)              Today, you had the following     No orders found for display         Today's Medication Changes          These changes are accurate as of: 3/30/17 11:59 PM.  If you have any questions, ask your nurse or doctor.               These medicines have changed or have updated prescriptions.        Dose/Directions    oxyCODONE 10 MG IR tablet   Commonly known as:  ROXICODONE   This may have changed:    - medication strength  - how much to take  - how to take this  - when to take this  - reasons to take this   Used for:  Chronic pain syndrome, Encounter for long-term use of opiate analgesic   Changed by:  Shanda Vo APRN CNP        Dose:  10 mg   Take 1 tablet (10 mg) by mouth every 6 hours as needed for moderate to severe pain   Quantity:  45 tablet   Refills:  0            Where to get your medicines      Some of these will need a paper prescription and others can be bought over the counter.  Ask your nurse if you have questions.     Bring a paper prescription for each of these medications     oxyCODONE 10 MG IR tablet    temazepam 30 MG capsule                Primary Care Provider Office Phone # Fax #    BOBBY Balbuena [phone]       [facility] [address] [facility] [address]  [facility] [address]        Thank you!     Thank you for choosing [facility]  for your care. Our goal is always to provide you with excellent care. Hearing back from our patients is one way we can continue to improve our services. Please take a few minutes to complete the written survey that you may receive in the mail after your visit with us. Thank you!             Your Updated Medication List - Protect others around you: Learn how to safely use, store and throw away your medicines at www.disposemymeds.org.          This list is accurate as of: 3/30/17 11:59 PM.  Always use your most recent med list.                   Brand Name Dispense Instructions for use    ADVAIR DISKUS 250-50 MCG/DOSE diskus inhaler   Generic drug:  fluticasone-salmeterol      INHALE 1 PUFF PO Q 12 HOURS .TK ON A SCHEDULED BASIS FOR COPD       albuterol 108 (90 BASE) MCG/ACT Inhaler    PROAIR HFA/PROVENTIL HFA/VENTOLIN HFA    1 Inhaler    Inhale 2 puffs into the lungs every 4 hours as needed for shortness of breath / dyspnea or wheezing       diazepam 10 MG tablet    VALIUM    60 tablet    Take 0.5-1 tablets (5-10 mg) by mouth every 12 hours as needed for anxiety or sleep       omeprazole 40 MG capsule    priLOSEC    30 capsule    Take 1 capsule (40 mg) by mouth daily Take 30-60 minutes before a meal.       oxyCODONE 10 MG IR tablet    ROXICODONE    45 tablet    Take 1 tablet (10 mg) by mouth every 6 hours as needed for moderate to severe pain       temazepam 30 MG capsule    RESTORIL    30 capsule    Take 1 capsule (30 mg) by mouth nightly as needed for sleep

## 2017-03-30 NOTE — PROGRESS NOTES
SUBJECTIVE:                                                    Alka Mcfadden is a 56 year old female who presents to clinic today for the following health issues:  [facility]: Cherelle Aguilera    Patient is here for a PAP exam. She has a burning feeling and would like a pelvic exam. She canceled her mammogram due to pain.    COPD Follow-Up    Symptoms are currently: stable    Current fatigue or dyspnea with ambulation: Up and Down    Shortness of breath: with ambulation longer than 50 feet    Increased or change in Cough/Sputum: No, Same.  Decreased a little bit.    Fever(s): No    Baseline ambulation without stopping to rest 50 feet. Could probably go farther if she didn't have trouble with her leg. Able to walk up 1 flights of stairs without stopping to rest.     Any ER/UC or hospital admissions since your last visit? No     History   Smoking Status     Light Tobacco Smoker     Types: Cigarettes   Smokeless Tobacco     Never Used     Comment: 3-4 cigarettes per week.     No results found for: FEV1, CKR6HCP     Chronic Pain: lumbar back pain with radiation and profound neuropathy.         Description: Pain is still the same from last time (bad).  No improvement.  Location of pain: bilateral  Character of pain: sharp, gnawing, burning and constant  Pain radiation: Does not radiate and radiates into the left buttocks  Since last visit, pain is: Worsened, however, she has not been on any medication for pain since mid December on a regular basis  New numbness or weakness in legs, not attributed to pain: no     Intensity: Currently 7/10, worst is 10/10    History:   Pain interferes with job: Not applicable  Therapies tried with relief: chiropractor, heat, muscle relaxants, opioids and rest  Therapies tried without relief: massage, NSAIDs, Physical Therapy and steroid injection   Patient reports she has an upcoming appointment with Dr. Alfredo for pain next week. She is currently taking oxycodone (short acting). She could take up to  "2 pills (10 mg) at a time and she only takes 1.5 pills (7.5)   She complains of constipation last week. She took Dulcolax and it worked.     Patient also complains that her hip feels like \"something's crunching\" like a bone scratching on a screw.     Mental Health Followup: Depression    Status since last visit: Stable     See PHQ-9 for current symptoms.  Other associated symptoms: None    Complicating factors:   Significant life event:  No   Current substance abuse:  None  Anxiety or Panic symptoms:  No    Appetite - Patient reports that she is eating well but still lost weight. She is eating rice pudding, pumpkin bars, sweet potatoes, shrimp, chicken, and steak.    PHQ-9  English PHQ-9   Any Language           Vaginal pain:  Due for PAP     Onset: 2 years ago, getting worse.     Description:   Deep, tight, vibrating pain, especially with sitting down    Intensity: 5-8/10    Progression of Symptoms:  waxing and waning    Accompanying Signs & Symptoms:  Small amount vaginal discharge.        Previous history of similar problem:   No previous history of pain, infection or abnormal PAPs     Precipitating factors:   Worsened by: Sitting for prolonged time    Alleviating factors:  Improved by: nothing noted thus far.        Therapies Tried and outcome: Nothing has been tried, infection has been ruled out in the past and she has never had an abnormal PAP.   :  Redness on Knuckles  Patient complains of redness on her knuckles on both hands. She says it feels like it's \"electric\".       Amount of exercise or physical activity: None    Problems taking medications regularly: No    Medication side effects: none    Diet: regular (no restrictions)    Social History   Substance Use Topics     Smoking status: Light Tobacco Smoker     Types: Cigarettes     Smokeless tobacco: Never Used      Comment: 3-4 cigarettes per week.     Alcohol use No        Problem list and histories reviewed & adjusted, as indicated.  Additional history: " as documented    Patient Active Problem List   Diagnosis     Ankylosing spondylitis (H)     CRPS (complex regional pain syndrome), lower limb     Encounter for long-term use of opiate analgesic     Hyperalgesia      ### Cataract, OU     Dry eyes     Hyperlipidemia LDL goal <130     Constipation     Insomnia     Tobacco use disorder     Open wound of knee, leg, and ankle     Anxiety     Chronic pain syndrome     Abdominal pain, right upper quadrant     Suicidal ideation     Nausea     Unexplained weight loss     Vaginal bleeding     Abdominal pain     Pancreatic atrophy     Haney's esophagus determined by biopsy     Chronic obstructive pulmonary disease (H) dx at [place]     Past Surgical History:   Procedure Laterality Date     ARTHROPLASTY REVISION HIP  2005, 2015     BACK SURGERY  1985/1990/1996     ENDOSCOPIC ULTRASOUND UPPER GASTROINTESTINAL TRACT (GI) N/A 1/5/2017    Procedure: ENDOSCOPIC ULTRASOUND, ESOPHAGOSCOPY / UPPER GASTROINTESTINAL TRACT (GI);  Surgeon: Esteban Mortensen MD;  Location: [facility] OR     ESOPHAGOSCOPY, GASTROSCOPY, DUODENOSCOPY (EGD), COMBINED N/A 1/5/2017    Procedure: COMBINED ESOPHAGOSCOPY, GASTROSCOPY, DUODENOSCOPY (EGD);  Surgeon: Esteban Mortensen MD;  Location: [facility] OR     JOINT REPLACEMENT  1985       Social History   Substance Use Topics     Smoking status: Light Tobacco Smoker     Types: Cigarettes     Smokeless tobacco: Never Used      Comment: 3-4 cigarettes per week.     Alcohol use No     History reviewed. No pertinent family history.      Current Outpatient Prescriptions   Medication Sig Dispense Refill     albuterol (PROAIR HFA/PROVENTIL HFA/VENTOLIN HFA) 108 (90 BASE) MCG/ACT Inhaler Inhale 2 puffs into the lungs every 4 hours as needed for shortness of breath / dyspnea or wheezing 1 Inhaler 3     ADVAIR DISKUS 250-50 MCG/DOSE diskus inhaler INHALE 1 PUFF PO Q 12 HOURS .TK ON A SCHEDULED BASIS FOR COPD  0     oxyCODONE (ROXICODONE) 5 MG IR tablet        diazepam (VALIUM) 10  MG tablet Take 0.5-1 tablets (5-10 mg) by mouth every 12 hours as needed for anxiety or sleep 60 tablet 0     omeprazole (PRILOSEC) 40 MG capsule Take 1 capsule (40 mg) by mouth daily Take 30-60 minutes before a meal. 30 capsule 1     temazepam (RESTORIL) 30 MG capsule Take 1 capsule (30 mg) by mouth nightly as needed for sleep 30 capsule 0     Allergies   Allergen Reactions     Amoxicillin-Pot Clavulanate Rash     Augmentin Rash     Amitriptyline      Had reaction while on this and vistaril; was just not herself.     Hydroxyzine Other (See Comments)     Had reaction when took this along with amitriptyline.     Vistaril      Had reaction when took this along with amitriptyline.     Recent Labs   Lab Test  02/02/17   1657  01/05/17   0904   12/01/16   1349  12/01/16   0937  10/09/14   LDL   --    --    --    --    --    --   126   HDL   --    --    --    --    --    --   39   TRIG   --    --    --    --    --    --   137   ALT  30  16   --    --   15   < >   --    CR  0.75  0.64   < >   --   0.68   < >   --    GFRESTIMATED  79  >90  Non  GFR Calc     < >   --   89   < >   --    GFRESTBLACK  >90   GFR Calc    >90   GFR Calc     < >   --   >90   GFR Calc     < >   --    POTASSIUM  4.4  4.2   < >   --   3.7   < >   --    TSH  0.70   --    --   0.34*   --    < >   --     < > = values in this interval not displayed.      BP Readings from Last 3 Encounters:   03/30/17 134/71   03/16/17 103/77   02/02/17 136/87    Wt Readings from Last 3 Encounters:   03/30/17 46.5 kg (102 lb 8 oz)   03/16/17 47.4 kg (104 lb 8 oz)   02/02/17 46.3 kg (102 lb)        Labs reviewed in EPIC  Problem list, Medication list, Allergies, and Medical/Social/Surgical histories reviewed in [facility] and updated as appropriate.     ROS: 10 point ROS neg other than the symptoms noted above in the HPI.    This document serves as a record of the services and decisions personally performed and made  by Shanda Thron, CNP. It was created on her behalf by Faith Madsen, a trained medical scribe. The creation of this document is based on the provider's statements to the medical scribe.  Faith Madsen 4:36 PM 3/30/2017    OBJECTIVE:                                                    /71 (BP Location: Left arm, Patient Position: Chair, Cuff Size: Adult Large)  Pulse 88  Temp 98.2  F (36.8  C) (Oral)  Resp 12  Wt 46.5 kg (102 lb 8 oz)  SpO2 98%  BMI 17.06 kg/m2  Body mass index is 17.06 kg/(m^2).  GENERAL: healthy, alert, well nourished, well hydrated, no distress  SKIN: erythema on knuckles of both hands, no suspicious lesions, no rashes  Cardiovascular: negative findings: PMI normal, regular rate and rhythm, S1 normal, S2 normal  Respiratory: negative findings: chest symmetric with normal A/P diameter, normal respiratory rate and rhythm, lungs clear to auscultation  ENT: ENT exam normal, no neck nodes or sinus tenderness  Abdomen: Abdomen soft, non-tender. BS normal. No masses, organomegaly  NEURO: Gait normal. Reflexes normal and symmetric. Sensation grossly WNL., positive findings: muscular weakness left leg, abnormality of coordination left leg paraesthesia ,   SKIN: no suspicious lesions or rashes  :  Normal external genitalia without lesions ane  Pelvic exam:   Normal external genital exam  No cervical motion tenderness  No discharge  Normal bimanual exam no uterine or adnexal axial tenderness or mass  Pap smear done impending results  Wet prep done pending results  MENTAL HEALTH  Appearance:  awake, alert and adequately groomed  Attitude:  cooperative  Eye Contact:  good  Mood:  depressed  Affect: bright  Speech:  clear, coherent  Psychomotor Behavior:  no evidence of tardive dyskinesia, dystonia, or tics  Thought Process:  logical and linear  Associations:  no loose associations  Thought Content:  no evidence of suicidal ideation or homicidal ideation and no evidence of psychotic  thought  Insight:  good  Judgment:  limited  Oriented to:  time, person, and place  Attention Span and Concentration:  limited  Recent and Remote Memory:  intact  Fund of Knowledge: appropriate  Muscle Strength and Tone: normal   See [facility] notes    Diagnostic Test Results:  Results for orders placed or performed in visit on 03/30/17   Wet prep   Result Value Ref Range    Specimen Description Vagina     Wet Prep       No clue cells seen  No yeast seen  No Trichomonas seen      Micro Report Status Pending         ASSESSMENT/PLAN:                                                    (G89.4) Chronic pain syndrome  (primary encounter diagnosis)  Comment: Inadequate pain management.    Plan: oxyCODONE (ROXICODONE) 10 MG IR tablet       Remains conflicted about how to treat her pain.     Continue to take 5-10 mg of oxycodone every 6 hours.    (Z79.891) Encounter for long-term use of opiate analgesic  Comment: Considering returning to long acting medication for pain, fentanyl or oxycontin.   Plan: oxyCODONE (ROXICODONE) 10 MG IR tablet        Will remain on short acting medication at this time.     (Z01.419) Encounter for gynecological examination without abnormal finding  Comment: Due for routine exam.    Plan: Pap imaged thin layer screen with HPV -         recommended age 30 - 65, HPV High Risk Types         DNA Cervical        Will call with results that require further intervention, otherwise will send a letter.       Patient Instructions:    Today at our visit we discussed:   MEDICATIONS:   Orders Placed This Encounter   Medications     oxyCODONE (ROXICODONE) 10 MG IR tablet     Sig: Take 1 tablet (10 mg) by mouth every 6 hours as needed for moderate to severe pain     Dispense:  45 tablet     Refill:  0     temazepam (RESTORIL) 30 MG capsule     Sig: Take 1 capsule (30 mg) by mouth nightly as needed for sleep     Dispense:  30 capsule     Refill:  0     Medications Discontinued During This Encounter   Medication Reason      ciprofloxacin (CIPRO) 500 MG tablet Stopped by Patient     levofloxacin (LEVAQUIN) 500 MG tablet Stopped by Patient     predniSONE (DELTASONE) 20 MG tablet Stopped by Patient     oxyCODONE (ROXICODONE) 5 MG IR tablet Reorder     temazepam (RESTORIL) 30 MG capsule Reorder          - Continue other medications without change  FUTURE APPOINTMENTS:       - Follow-up visit in 4 weeks    The information in this document, created by the medical scribe, Faith Madsen, for me, accurately reflects the services I personally performed and the decisions made by me. I have reviewed and approved this document for accuracy prior to leaving the patient care area.  BOBBY Butcher [facility] PRIMARY [facility]

## 2017-03-30 NOTE — MR AVS SNAPSHOT
"              After Visit Summary   3/30/2017    Alka Mcfadden    MRN: [id number]           Patient Information     Date Of Birth          1960        Visit Information        Provider Department      3/30/2017 4:00 PM Shanda Vo APRN CNP [facility]        Today's Diagnoses     Chronic pain syndrome    -  1    Encounter for long-term use of opiate analgesic        Encounter for gynecological examination without abnormal finding           Follow-ups after your visit        Your next 10 appointments already scheduled     Apr 04, 2017  3:20 PM CDT   New Visit with Pooja Alfredo MD   [facility] ([facility])    [address] [place] [address]  [address]  [facility] [address] [phone]              Who to contact     If you have questions or need follow up information about today's clinic visit or your schedule please contact [facility] directly at [phone].  Normal or non-critical lab and imaging results will be communicated to you by MyChart, letter or phone within 4 business days after the clinic has received the results. If you do not hear from us within 7 days, please contact the clinic through [employer] or phone. If you have a critical or abnormal lab result, we will notify you by phone as soon as possible.  Submit refill requests through Rdio or call your pharmacy and they will forward the refill request to us. Please allow 3 business days for your refill to be completed.          Additional Information About Your Visit        [employer] Delivery Service (HDS) Information     Rdio lets you send messages to your doctor, view your test results, renew your prescriptions, schedule appointments and more. To sign up, go to [email or web address] . Click on \"Log in\" on the left side of the screen, which will take you to the Welcome page. Then click on \"Sign up Now\" on the right side of the page.     You will " be asked to enter the access code listed below, as well as some personal information. Please follow the directions to create your username and password.     Your access code is: [email or web address]  Expires: 2017  4:56 PM     Your access code will  in 90 days. If you need help or a new code, please call your [facility] clinic or [phone].        Care EveryWhere ID     This is your Care EveryWhere ID. This could be used by other organizations to access your [facility] medical records  [id number]        Your Vitals Were     Pulse Temperature Respirations Pulse Oximetry BMI (Body Mass Index)       88 98.2  F (36.8  C) (Oral) 12 98% 17.06 kg/m2        Blood Pressure from Last 3 Encounters:   17 134/71   17 103/77   17 136/87    Weight from Last 3 Encounters:   17 102 lb 8 oz (46.5 kg)   17 104 lb 8 oz (47.4 kg)   17 102 lb (46.3 kg)              We Performed the Following     HPV High Risk Types DNA Cervical     Pap imaged thin layer screen with HPV - recommended age 30 - 65     Wet prep          Today's Medication Changes          These changes are accurate as of: 3/30/17  4:57 PM.  If you have any questions, ask your nurse or doctor.               These medicines have changed or have updated prescriptions.        Dose/Directions    oxyCODONE 10 MG IR tablet   Commonly known as:  ROXICODONE   This may have changed:    - medication strength  - how much to take  - how to take this  - when to take this  - reasons to take this   Used for:  Chronic pain syndrome, Encounter for long-term use of opiate analgesic   Changed by:  Shanda Vo APRN CNP        Dose:  10 mg   Take 1 tablet (10 mg) by mouth every 6 hours as needed for moderate to severe pain   Quantity:  45 tablet   Refills:  0            Where to get your medicines      Some of these will need a paper prescription and others can be bought over the counter.  Ask your nurse if you have questions.     Bring a paper  prescription for each of these medications     oxyCODONE 10 MG IR tablet                Primary Care Provider Office Phone # Fax #    BOBBY Balbuena [phone]       [facility] [address] [facility] [address]  [facility] [address]        Thank you!     Thank you for choosing [facility] PRIMARY CARE  for your care. Our goal is always to provide you with excellent care. Hearing back from our patients is one way we can continue to improve our services. Please take a few minutes to complete the written survey that you may receive in the mail after your visit with us. Thank you!             Your Updated Medication List - Protect others around you: Learn how to safely use, store and throw away your medicines at www.disposemymeds.org.          This list is accurate as of: 3/30/17  4:57 PM.  Always use your most recent med list.                   Brand Name Dispense Instructions for use    ADVAIR DISKUS 250-50 MCG/DOSE diskus inhaler   Generic drug:  fluticasone-salmeterol      INHALE 1 PUFF PO Q 12 HOURS .TK ON A SCHEDULED BASIS FOR COPD       albuterol 108 (90 BASE) MCG/ACT Inhaler    PROAIR HFA/PROVENTIL HFA/VENTOLIN HFA    1 Inhaler    Inhale 2 puffs into the lungs every 4 hours as needed for shortness of breath / dyspnea or wheezing       diazepam 10 MG tablet    VALIUM    60 tablet    Take 0.5-1 tablets (5-10 mg) by mouth every 12 hours as needed for anxiety or sleep       omeprazole 40 MG capsule    priLOSEC    30 capsule    Take 1 capsule (40 mg) by mouth daily Take 30-60 minutes before a meal.       oxyCODONE 10 MG IR tablet    ROXICODONE    45 tablet    Take 1 tablet (10 mg) by mouth every 6 hours as needed for moderate to severe pain       temazepam 30 MG capsule    RESTORIL    30 capsule    Take 1 capsule (30 mg) by mouth nightly as needed for sleep

## 2017-03-30 NOTE — NURSING NOTE
"Chief Complaint   Patient presents with     RECHECK       Initial /71 (BP Location: Left arm, Patient Position: Chair, Cuff Size: Adult Large)  Pulse 88  Temp 98.2  F (36.8  C) (Oral)  Resp 12  Wt 102 lb 8 oz (46.5 kg)  SpO2 98%  BMI 17.06 kg/m2 Estimated body mass index is 17.06 kg/(m^2) as calculated from the following:    Height as of 1/5/17: 5' 5\" (1.651 m).    Weight as of this encounter: 102 lb 8 oz (46.5 kg).  Medication Reconciliation: complete     Oswaldo Santiago MA    "

## 2017-03-31 NOTE — PROGRESS NOTES
"[facility] - Integrated Primary Care   March 16, 2017      Behavioral Health Clinician Progress Note    Patient Name: Alka Mcfadden           Service Type: Individual      Service Location:   Face to Face in Clinic     Session Start Time: 4:11pm  Session End Time: 4:50pm      Session Length: 38 - 52      Attendees: Patient, PCP and Spouse / Significant Other    Visit Activities (Refresh list every visit): [facility] [employer]    Diagnostic Assessment Date: not completed yet  Treatment Plan Review Date: not completed yet  See Flowsheets for today's PHQ-9 and JAMAL-7 results  Previous PHQ-9:   PHQ-9 SCORE 12/8/2015 7/15/2016 12/5/2016   Total Score - - -   Total Score - - -   Total Score 0 0 0     Previous JAMAL-7:   JAMAL-7 SCORE 12/8/2015 7/15/2016 12/5/2016   Total Score - - -   Total Score 0 0 0   Total Score - - -       KENDRA LEVEL:  No flowsheet data found.    DATA  Extended Session (60+ minutes): No  Interactive Complexity: No  Crisis: No    Treatment Objective(s) Addressed in This Session:  Target Behavior(s): disease management/lifestyle changes mental health    Depressed Mood: Increase interest, engagement, and pleasure in doing things  Decrease frequency and intensity of feeling down, depressed, hopeless  Identify negative self-talk and behaviors: challenge core beliefs, myths, and actions  Improve concentration, focus, and mindfulness in daily activities   Anxiety: will experience a reduction in anxiety, will develop more effective coping skills to manage anxiety symptoms, will develop healthy cognitive patterns and beliefs and will increase ability to function adaptively  Psychological distress related to Pain  Psychological distress related to Chronic Disease Management    Current Stressors / Issues:  [facility] met with patient at PCP request to assess current behavioral health needs and provide information and support as necessary. Pt reported that her mood is \"up and down\". Pt reported that her mood largely depends on " "how much pain she is in that day. Pt stated that she is struggling to accept her body as it currently is and the limitations she now has. Pt stated that she has been active her whole life and now she can't do the things she enjoys such as playing with her grandson. Pt stated that she also has a lot of anxiety in terms of feeling like her pain is never going to get better and anxiety regarding the reason she has the pain. Pt and writer processed the grieving process of losing the identity that she once had and accepting, which doesn't mean liking, her new identity and reality. Pt stated that she does have skills she can use such as meditation and relaxation as well as people she can call and talk to. Pt stated that she wants an answer for why she is in so much pain and at the same time is scared of what that answer is. When asked if she wanted additional support with her mental health such as meeting with a [facility], pt stated \"I don't need to meet with anyone, I need the pain to go away\". Pt stated that the pain started and then the unstable mood started, however previous to the intense pain, pt stated that her mood was manageable.    Reviewed new and ongoing stressors  Reviewed mental health symptoms and appropriate coping mechanisms    I affirmed the steps this patient has taken to address physical and behavioral health issues, and offered continued behavioral health services or referral, now or in the future, as needed by the patient.        Progress on Treatment Objective(s) / Homework:  No improvement - PREPARATION (Decided to change - considering how); Intervened by negotiating a change plan and determining options / strategies for behavior change, identifying triggers, exploring social supports, and working towards setting a date to begin behavior change    Motivational Interviewing    MI Intervention: Expressed Empathy/Understanding, Supported Autonomy, Collaboration, Evocation, Permission to raise concern or " advise, Open-ended questions, Reflections: simple and complex, Rolled with resistance: Simple reflection, Complex reflection and Evoked patient agenda and Change talk (evoked)     Change Talk Expressed by the Patient: Desire to change Ability to change Reasons to change Need to change Committment to change Activation Taking steps    Provider Response to Change Talk: E - Evoked more info from patient about behavior change, A - Affirmed patient's thoughts, decisions, or attempts at behavior change, R - Reflected patient's change talk and S - Summarized patient's change talk statements      Care Plan review completed: No    Medication Review:  No changes to current psychiatric medication(s)    Medication Compliance:  NA    Changes in Health Issues:   Yes: Pain, Associated Psychological Distress    Chemical Use Review:   Substance Use: Chemical use reviewed, no active concerns identified      Tobacco Use: No current tobacco use.      Assessment: Current Emotional / Mental Status (status of significant symptoms):  Risk status (Self / Other harm or suicidal ideation)  Patient denies a history of suicidal ideation, suicide attempts, self-injurious behavior, homicidal ideation, homicidal behavior and and other safety concerns  Patient denies current fears or concerns for personal safety.  Patient denies current or recent suicidal ideation or behaviors.  Patient denies current or recent homicidal ideation or behaviors.  Patient denies current or recent self injurious behavior or ideation.  Patient denies other safety concerns.  A safety and risk management plan has not been developed at this time, however patient was encouraged to call [place] [phone] should there be a change in any of these risk factors.    Appearance:   Appropriate   Eye Contact:   Good   Psychomotor Behavior: Normal   Attitude:   Cooperative   Orientation:   All  Speech   Rate / Production: Normal    Volume:  Normal    Mood:    Normal  Affect:    Appropriate   Thought Content:  Clear   Thought Form:  Coherent  Goal Directed  Logical   Insight:    Good     Diagnoses:  1. Anxiety    2. Chronic pain syndrome        Collateral Reports Completed:  Not Applicable    Plan: (Homework, other):  Patient was given information about behavioral services and encouraged to schedule a follow up appointment with the clinic [facility] as needed.  She was also given information about mental health symptoms and treatment options .  CD Recommendations: No indications of CD issues.  CHERY Lane, [facility]      ______________________________________________________________________

## 2017-04-03 LAB
MICRO REPORT STATUS: NORMAL
SPECIMEN SOURCE: NORMAL
WET PREP SPEC: NORMAL

## 2017-04-04 ENCOUNTER — OFFICE VISIT (OUTPATIENT)
Dept: ADDICTION MEDICINE | Facility: CLINIC | Age: 57
End: 2017-04-04
Payer: COMMERCIAL

## 2017-04-04 ENCOUNTER — TELEPHONE (OUTPATIENT)
Dept: FAMILY MEDICINE | Facility: CLINIC | Age: 57
End: 2017-04-04

## 2017-04-04 VITALS
RESPIRATION RATE: 16 BRPM | SYSTOLIC BLOOD PRESSURE: 124 MMHG | HEART RATE: 95 BPM | TEMPERATURE: 98.1 F | OXYGEN SATURATION: 96 % | DIASTOLIC BLOOD PRESSURE: 75 MMHG

## 2017-04-04 DIAGNOSIS — F11.20 UNCOMPLICATED OPIOID DEPENDENCE (H): Primary | ICD-10-CM

## 2017-04-04 LAB
COPATH REPORT: NORMAL
PAP: NORMAL

## 2017-04-04 PROCEDURE — 99205 OFFICE O/P NEW HI 60 MIN: CPT | Performed by: ANESTHESIOLOGY

## 2017-04-04 RX ORDER — BUPRENORPHINE AND NALOXONE 4; 1 MG/1; MG/1
1 FILM, SOLUBLE BUCCAL; SUBLINGUAL
Qty: 60 FILM | Refills: 0 | Status: SHIPPED | OUTPATIENT
Start: 2017-04-04 | End: 2017-04-21 | Stop reason: ALTCHOICE

## 2017-04-04 NOTE — PATIENT INSTRUCTIONS
1.  Trial of buprenorphine.  This will need a prior authorization.  When you receive this medication do not start it until you have been off all other opioids (oxycodone) for at least 24 hours.     2. Follow up with me in one week    Pooja Alfredo MD

## 2017-04-04 NOTE — TELEPHONE ENCOUNTER
Call Regarding Preventive Health Screening Mammogram    Attempt 1    Message on voicemail     Comments:       Outreach   Citlali Daly

## 2017-04-04 NOTE — MR AVS SNAPSHOT
"              After Visit Summary   4/4/2017    Alka Mcfadden    MRN: [id number]           Patient Information     Date Of Birth          1960        Visit Information        Provider Department      4/4/2017 3:20 PM Pooja Alfredo MD [facility]        Today's Diagnoses     Uncomplicated opioid dependence (H)    -  1      Care Instructions    1.  Trial of buprenorphine.  This will need a prior authorization.  When you receive this medication do not start it until you have been off all other opioids (oxycodone) for at least 24 hours.     2. Follow up with me in one week    Pooja Alfredo MD         Follow-ups after your visit        Who to contact     If you have questions or need follow up information about today's clinic visit or your schedule please contact [facility] directly at [phone].  Normal or non-critical lab and imaging results will be communicated to you by [employer], letter or phone within 4 business days after the clinic has received the results. If you do not hear from us within 7 days, please contact the clinic through [employer] or phone. If you have a critical or abnormal lab result, we will notify you by phone as soon as possible.  Submit refill requests through [email or web address] or call your pharmacy and they will forward the refill request to us. Please allow 3 business days for your refill to be completed.          Additional Information About Your Visit        MyChart Information     [email or web address] lets you send messages to your doctor, view your test results, renew your prescriptions, schedule appointments and more. To sign up, go to [email or web address] . Click on \"Log in\" on the left side of the screen, which will take you to the Welcome page. Then click on \"Sign up Now\" on the right side of the page.     You will be asked to enter the access code listed below, as well as some personal information. Please follow the directions to create " your username and password.     Your access code is: [email or web address]  Expires: 2017  4:56 PM     Your access code will  in 90 days. If you need help or a new code, please call your [facility] or [phone].        Care EveryWhere ID     This is your Care EveryWhere ID. This could be used by other organizations to access your [facility] medical records  [id number]        Your Vitals Were     Pulse Temperature Respirations Pulse Oximetry          95 98.1  F (36.7  C) (Oral) 16 96%         Blood Pressure from Last 3 Encounters:   17 124/75   17 134/71   17 103/77    Weight from Last 3 Encounters:   17 102 lb 8 oz (46.5 kg)   17 104 lb 8 oz (47.4 kg)   17 102 lb (46.3 kg)              Today, you had the following     No orders found for display         Today's Medication Changes          These changes are accurate as of: 17  4:01 PM.  If you have any questions, ask your nurse or doctor.               Start taking these medicines.        Dose/Directions    buprenorphine HCl-naloxone HCl 4-1 MG per film   Commonly known as:  SUBOXONE   Used for:  Uncomplicated opioid dependence (H)   Started by:  Pooja Alfredo MD        Dose:  1 Film   Place 1 Film under the tongue 2 times daily   Quantity:  60 Film   Refills:  0            Where to get your medicines      Some of these will need a paper prescription and others can be bought over the counter.  Ask your nurse if you have questions.     Bring a paper prescription for each of these medications     buprenorphine HCl-naloxone HCl 4-1 MG per film                Primary Care Provider Office Phone # Fax #    BOBBY Balbuena [phone]       [facility] [address] [facility] [address]  [facility] [address]        Thank you!     Thank you for choosing [facility]  for your care. Our goal is always to provide you with excellent care. Hearing back from our  patients is one way we can continue to improve our services. Please take a few minutes to complete the written survey that you may receive in the mail after your visit with us. Thank you!             Your Updated Medication List - Protect others around you: Learn how to safely use, store and throw away your medicines at www.disposemymeds.org.          This list is accurate as of: 4/4/17  4:01 PM.  Always use your most recent med list.                   Brand Name Dispense Instructions for use    ADVAIR DISKUS 250-50 MCG/DOSE diskus inhaler   Generic drug:  fluticasone-salmeterol      INHALE 1 PUFF PO Q 12 HOURS .TK ON A SCHEDULED BASIS FOR COPD       albuterol 108 (90 BASE) MCG/ACT Inhaler    PROAIR HFA/PROVENTIL HFA/VENTOLIN HFA    1 Inhaler    Inhale 2 puffs into the lungs every 4 hours as needed for shortness of breath / dyspnea or wheezing       buprenorphine HCl-naloxone HCl 4-1 MG per film    SUBOXONE    60 Film    Place 1 Film under the tongue 2 times daily       diazepam 10 MG tablet    VALIUM    60 tablet    Take 0.5-1 tablets (5-10 mg) by mouth every 12 hours as needed for anxiety or sleep       omeprazole 40 MG capsule    priLOSEC    30 capsule    Take 1 capsule (40 mg) by mouth daily Take 30-60 minutes before a meal.       oxyCODONE 10 MG IR tablet    ROXICODONE    45 tablet    Take 1 tablet (10 mg) by mouth every 6 hours as needed for moderate to severe pain       temazepam 30 MG capsule    RESTORIL    30 capsule    Take 1 capsule (30 mg) by mouth nightly as needed for sleep

## 2017-04-04 NOTE — PROGRESS NOTES
[facility] Consultation    Date of visit: 4/4/2017    Reason for consultation:    Alka Mcfadden is a 56 year old female who is seen in consultation today at the request of her PCP, Shanda Moore for chronic widespread pain. This is a triage evaluation to determine if she is a candidate for comprehensive pain management and to make interim recommendations to the referring clinician.      Please see the [facility] health questionnaire which the patient completed and reviewed with me in detail.    Review of [place] Prescription Monitoring Program (): Today I have also reviewed the patient's history of controlled substance use, as provided by [place] licensed pharmacies and prescriber dispensers. YES, no concerns      Pain medications are being prescribed by her PCP.       Assessment:  Alka Mcfadden is a 56 year old female with a past medical history significant for abdominal pain, CRPS, hyperalgesia, COPD, pancreatic atrophy, ankylosing spondylitis, anxiety, depression, chronic pain syndrome, insomnia and constipation who presents with complaints of chronic widespread pain.     1. Chronic pain syndrome - neuropathic pain  2. Hip pain - s/p total hip replacement  3. Abdominal pain - unknown etiology  4. Mental Health - the patient's mental health concerns, specifically her anxiety and depression, affect her experience of pain and contribute to her clinically significant distress.      Plan:  The following recommendations were given to the patient. Diagnosis, treatment options, risks, benefits, and alternatives were discussed, and all questions were answered. The patient expressed understanding of the plan for management.       Alka Mcfadden is a 56 year old female is not a candidate for a comprehensive pain management program at this time.  She is not currently willing to participate as she has done everything many times in the past.  I think this is  "reasonable.     1. Medication Management: She has tried a lot of different traditional opioids over the years both high dose and low dose.  When asked she cannot identify any regiment that provides good pain relief.  I am recommending a trial of buprenorphine as it does not cause physical tolerance and has a more gage side effect profile, specifically she is having a lot of trouble with constipation.  If she does not get pain relief from this we will need to come up with another plan.  Likely long acting opioid type pain medications again.   1. Urine toxicology screen - not today  2. Opioid agreement signed - not today  2. Further procedures recommended: none  3. Referrals: none  4. Release of information: none  Follow up: with Dr. Alfredo in one week.      Chief Complaint:    Chief Complaint   Patient presents with     Pain       Pain history:  Alka Mcfadden is a 56 year old female who presents for initial evaluation of chief complaint of chronic widespread pain.  Her pain began around age 16 when she was diagnosed with ankylosing spondylosis.  She has had multiple spine surgeries as a result.  She has been diagnosed with CRPS in the past as well.  Her pain is widespread and feels \"electric\".  She states, \" I feel like an electric violin being played.  I can feel every neuron firing from the inside out. It feels like I am getting electricuted constantly\".   Her pain has worsened significantly over the last 2 years after a hospitalization where she states she was placed in an induced coma at [facility] for pain control?  When asked what is her worst pain she cannot identify one source and states, \"my hips, abdomen, chest, back, legs, face, and everything\".      She has been on high and low doses of opioids and has weaned herself completely off at times.  These help take the edge off but are not great in relieving her pain. She is currently on oxycodone 10mg PRN as needed - averaging 2/day.  These medications are very " "constipating for her.     She was previously followed by Dr. Hua in the pain clinic. She has done pool therapy, PT, guided imagery, pain psychology, [employer], exercise,  Acupuncture, and TENS units over the years.  None of this was very helpful.     Location: everywhere  Quality: burning, stinging, sharp, electric.   Severity/Intensity: 10/10 at worst, 8/10 at best, 9/10 on average  Aggravating factors include: \"everything\"  Relieving factors include: \"nothing\"    The patient otherwise denies bowel or bladder incontinence, parasthesias, weakness, saddle anesthesia, unintentional weight loss, or fever/chills/sweats.     Alka Mcfadden has been seen at a pain clinic in the past.  YES, [facility] Hua     Pain Treatments:  1. Medications:       Current pain medications:   Oxycodone 10mg q6hrs PRN pain         Previous pain medications:   Fentanyl, tramadol, dilaudid   Gabapentin - \"made me crazy\" - 2 day trial in February    Cymbalta - depressed and didn't help with pain    2. Physical Therapy: YES  3. Surgery: YES  4. Injections: YES  5. Chiropractic: YES  6. Acupuncture: YES  7. TENS Unit: YES    Past Medical History:  Past Medical History:   Diagnosis Date     Ankylosing spondylitis (H)      Arachnoiditis      Haney's esophagus determined by biopsy 1/2017     Chronic pain syndrome      Pain in joint, pelvic region and thigh      Scheurmann's disease        Past Surgical History:  Past Surgical History:   Procedure Laterality Date     ARTHROPLASTY REVISION HIP  2005, 2015     BACK SURGERY  1985/1990/1996     ENDOSCOPIC ULTRASOUND UPPER GASTROINTESTINAL TRACT (GI) N/A 1/5/2017    Procedure: ENDOSCOPIC ULTRASOUND, ESOPHAGOSCOPY / UPPER GASTROINTESTINAL TRACT (GI);  Surgeon: Esteban Mortensen MD;  Location:  OR     ESOPHAGOSCOPY, GASTROSCOPY, DUODENOSCOPY (EGD), COMBINED N/A 1/5/2017    Procedure: COMBINED ESOPHAGOSCOPY, GASTROSCOPY, DUODENOSCOPY (EGD);  Surgeon: Esteban Mortensen MD;  Location: [facility] " OR     JOINT REPLACEMENT  1985       Medications:  Current Outpatient Prescriptions   Medication Sig Dispense Refill     oxyCODONE (ROXICODONE) 10 MG IR tablet Take 1 tablet (10 mg) by mouth every 6 hours as needed for moderate to severe pain 45 tablet 0     temazepam (RESTORIL) 30 MG capsule Take 1 capsule (30 mg) by mouth nightly as needed for sleep 30 capsule 0     albuterol (PROAIR HFA/PROVENTIL HFA/VENTOLIN HFA) 108 (90 BASE) MCG/ACT Inhaler Inhale 2 puffs into the lungs every 4 hours as needed for shortness of breath / dyspnea or wheezing 1 Inhaler 3     ADVAIR DISKUS 250-50 MCG/DOSE diskus inhaler INHALE 1 PUFF PO Q 12 HOURS .TK ON A SCHEDULED BASIS FOR COPD  0     diazepam (VALIUM) 10 MG tablet Take 0.5-1 tablets (5-10 mg) by mouth every 12 hours as needed for anxiety or sleep 60 tablet 0     omeprazole (PRILOSEC) 40 MG capsule Take 1 capsule (40 mg) by mouth daily Take 30-60 minutes before a meal. 30 capsule 1       Allergies:     Allergies   Allergen Reactions     Amoxicillin-Pot Clavulanate Rash     Augmentin Rash     Amitriptyline      Had reaction while on this and vistaril; was just not herself.     Hydroxyzine Other (See Comments)     Had reaction when took this along with amitriptyline.     Vistaril      Had reaction when took this along with amitriptyline.       Social History:  Home situation: Lives with her  of 45 years  Occupation/Schooling: disabled  Tobacco use: denies  Drug use: denies  Alcohol use: denies  History of chemical dependency treatment: denies  Mental health admissions: denies    Family history:  No family history on file.    Review of Systems:    POSTIVE IN BOLD  GENERAL: fever/chills, fatigue, general unwell feeling, weight gain/loss.  HEAD/EYES:  headache, dizziness, or vision changes.    EARS/NOSE/THROAT:  Nosebleeds, hearing loss, sinus infection, earache, tinnitus.  IMMUNE:  Allergies, cancer, immune deficiency, or infections.  SKIN:  Urticaria, rash,  hives  HEME/Lymphatic:   anemia, easy bruising, easy bleeding.  RESPIRATORY:  cough, wheezing, or shortness of breath  CARDIOVASCULAR/Circulation:  Extremity edema, syncope, hypertension, tachycardia, or angina.  GASTROINTESTINAL:  abdominal pain, nausea/emesis, diarrhea, constipation,  hematochezia, or melena.  ENDOCRINE:  Diabetes, steroid use,  thyroid disease or osteoporosis.  MUSCULOSKELETAL: neck pain, back pain, arthralgia, arthritis, or gout.  GENITOURINARY:  frequency, urgency, dysuria, difficulty voiding, hematuria or incontinence.  NEUROLOGIC:  weakness, numbness, paresthesias, seizure, tremor, stroke or memory loss.  PSYCHIATRIC:  depression, anxiety, stress, suicidal thoughts or mood swings.     Physical Exam:  Vitals:    04/04/17 1515   BP: 124/75   Pulse: 95   Resp: 16   Temp: 98.1  F (36.7  C)   TempSrc: Oral   SpO2: 96%     Exam:  Constitutional: cachetic, appears stated age.  HEENT: Head atraumatic, normocephalic. Eyes without conjunctival injection or jaundice. Oropharynx clear. Neck supple. No obvious neck masses.  Skin: No rash, lesions, or petechiae of exposed skin.   Extremities: Peripheral pulses intact. No clubbing, cyanosis, or edema.  Psychiatric/mental status: Alert, without lethargy or stupor. Speech fluent. Appropriate affect. Mood normal. Able to follow commands without difficulty.     Musculoskeletal exam:  Gait/Station/Posture: stooped posture, antalgic gait    Cervical spine:  Range of motion within normal limits      Lumbar spine: Rods visible, reduced ROM   Rotation/ext to right: painful    Rotation/ext to left: painful     Myofascial tenderness:  diffuse    Neurologic exam:  CN:  Cranial nerves 2-12 are grossly intact normal  Sensory:  (upper and lower extremities):   Light touch: normal      Diagnostic tests: not reviewed today    DIRE Score for ongoing opioid management is calculated as follows:   Diagnosis = 3 pts (advanced condition; severe pain/objective  findings)   Intractability = 3 pts (patient fully engaged but inadequate response to treatments)   Risk    Psych = 2 pts (personality dysfunction/mental illness that moderately interferes with care)    Chem Hlth = 2 pts (use of medications to cope with stress; chemical dependency in remission)   Reliability = 2 pts (occasional difficulties with compliance; generally reliable)   Social = 2 pts (reduction in some relationships/life rolls)   (Psych + Chem hlth + Reliability + Social) = 8     Efficacy = 1 pt (poor function; minimal pain relief despite mod/high med dose)         DIRE Score = 15        7-13: likely NOT suitable candidate for long-term opioid analgesia       14-21: may be a suitable candidate for long-term opioid analgesia       Review of Pain Questionnaire: Please see the [facility] [place] health questionnaire, which the patient completed and reviewed with me in detail.    Review of Electronic Chart: Today I have also reviewed available medical information in the patient's medical record at [facility] ([facility] including relevant provider notes, laboratory work, and imaging.     Total time spent was 60 minutes. Greater than 50% of face-to-face time was spent in patient counseling and/or coordination of care.      Pooja [facility]  4/4/2017

## 2017-04-05 ENCOUNTER — TELEPHONE (OUTPATIENT)
Dept: ADDICTION MEDICINE | Facility: CLINIC | Age: 57
End: 2017-04-05

## 2017-04-05 LAB
FINAL DIAGNOSIS: NORMAL
HPV HR 12 DNA CVX QL NAA+PROBE: NEGATIVE
HPV16 DNA SPEC QL NAA+PROBE: NEGATIVE
HPV18 DNA SPEC QL NAA+PROBE: NEGATIVE
SPECIMEN DESCRIPTION: NORMAL

## 2017-04-05 NOTE — TELEPHONE ENCOUNTER
Can someone please find this script in the fax pile and re-fax to [facility]?  I am in [place] the rest of the week.     Thanks,     Pooja Alfredo MD

## 2017-04-05 NOTE — TELEPHONE ENCOUNTER
Incoming call from Carol with [facility], they accidentally delete the Suboxone prescription from yesterday, please re-fax a new script.  [facility].  [phone]  Fax # [phone]        Pawel De La Rosa

## 2017-04-06 NOTE — TELEPHONE ENCOUNTER
Called and submitted PA for quantity of 60 films for 30 days. Reference # [id number]. Request submitted as urgent.  Bhumi Sanchez RN

## 2017-04-06 NOTE — TELEPHONE ENCOUNTER
Fax received from [facility] stating that pt's insurance does not cover a quantity of 60 of Suboxone, however quantity of 30 is covered. Please re-send a quanity of 30, otherwise script will need a PA. Writer placed form in provider's folder.     Thank you,  Deloris Leon

## 2017-04-06 NOTE — TELEPHONE ENCOUNTER
PA approved from, 4/6/17-4/6/18. Called and informed pharmacy of PA approval.  Bhumi Sanchez RN

## 2017-04-12 ENCOUNTER — TELEPHONE (OUTPATIENT)
Dept: FAMILY MEDICINE | Facility: CLINIC | Age: 57
End: 2017-04-12

## 2017-04-12 NOTE — TELEPHONE ENCOUNTER
FYI: Patient called she has not started the Suboxone yet, she will start it after her appt with the [facility] on 4/19/17.  Pt contact info:  [phone]      Pawel De La Rosa

## 2017-04-21 ENCOUNTER — TELEPHONE (OUTPATIENT)
Dept: FAMILY MEDICINE | Facility: CLINIC | Age: 57
End: 2017-04-21

## 2017-04-21 DIAGNOSIS — G89.4 CHRONIC PAIN SYNDROME: ICD-10-CM

## 2017-04-21 DIAGNOSIS — Z79.891 ENCOUNTER FOR LONG-TERM USE OF OPIATE ANALGESIC: ICD-10-CM

## 2017-04-21 RX ORDER — OXYCODONE HYDROCHLORIDE 10 MG/1
10 TABLET ORAL EVERY 6 HOURS PRN
Qty: 45 TABLET | Refills: 0 | Status: CANCELLED | OUTPATIENT
Start: 2017-04-21

## 2017-04-21 RX ORDER — OXYCODONE HYDROCHLORIDE 10 MG/1
10 TABLET ORAL EVERY 6 HOURS PRN
Qty: 90 TABLET | Refills: 0 | Status: SHIPPED | OUTPATIENT
Start: 2017-04-21 | End: 2017-05-31

## 2017-04-21 NOTE — TELEPHONE ENCOUNTER
oxyCODONE (ROXICODONE) 10 MG  Controlled Substance Refill Request    Last refill: 3/30/17    Last clinic visit: 3/30/17    Next appt: none    Controlled substance agreement on file: Yes:  Date 4/28/15.    Documentation in problem list reviewed:  Yes    Processing:  Patient will  in clinic    RX [place] ([place] reviewed:  reviewed- no concerns  [place]:  [email or web address]      Nickolas Dempsey RN      PA approved for Suboxone

## 2017-04-21 NOTE — TELEPHONE ENCOUNTER
Incoming call from pt requesting a refill     oxyCODONE (ROXICODONE) 10 MG  Last Written Prescription Date: 03/30/17  Last Fill Quantity: 45,  # refills: 0   Last Office Visit with [facility], [facility] or [facility] prescribing provider: 03/30/17    Pt states her , Rubio Mcfadden, will  lisa Leon

## 2017-04-23 NOTE — TELEPHONE ENCOUNTER
Spoke with Alka regarding request for Oxycodone.  Alka had called and informed Dr Alfredo (addiction/pain) that she did not intend to start suboxone until after she has completed her work up at [facility] in the next week.  She stated she would call or come in before she starts the suboxone.  Reports she understands how to start suboxone, however, she will ask if she needs further guidance.      She is having a thyroid nodule biopsy in the next week at [place].  She is in the process of trying to discern the cause of her persistent weight loss.  She has however, been above 100 lbs for several weeks.     She will follow up at [facility] clinic after she has completed her work up at [place].     Shanda Vo, CNP, APNP  [facility]

## 2017-04-29 DIAGNOSIS — M45.9 ANKYLOSING SPONDYLITIS (H): ICD-10-CM

## 2017-04-29 DIAGNOSIS — G89.4 CHRONIC PAIN SYNDROME: ICD-10-CM

## 2017-05-01 RX ORDER — TEMAZEPAM 30 MG
CAPSULE ORAL
Qty: 30 CAPSULE | Refills: 0 | Status: SHIPPED | OUTPATIENT
Start: 2017-05-01 | End: 2017-06-05

## 2017-05-01 NOTE — TELEPHONE ENCOUNTER
Routing refill request to provider for review/approval because:  Drug not on the [facility] refill protocol     temazepam (RESTORIL) 30 MG capsule  Last Written Prescription Date: 3/30/17  Last Fill Quantity: 30,  # refills: 0   Last Office Visit with [facility] or [facility] prescribing provider: 3/30/17    Nickolas Dempsey RN

## 2017-05-09 ENCOUNTER — OFFICE VISIT (OUTPATIENT)
Dept: FAMILY MEDICINE | Facility: CLINIC | Age: 57
End: 2017-05-09
Payer: COMMERCIAL

## 2017-05-09 ENCOUNTER — OFFICE VISIT (OUTPATIENT)
Dept: BEHAVIORAL HEALTH | Facility: CLINIC | Age: 57
End: 2017-05-09
Payer: COMMERCIAL

## 2017-05-09 VITALS
SYSTOLIC BLOOD PRESSURE: 94 MMHG | WEIGHT: 102.5 LBS | OXYGEN SATURATION: 95 % | TEMPERATURE: 98 F | BODY MASS INDEX: 17.06 KG/M2 | DIASTOLIC BLOOD PRESSURE: 66 MMHG | RESPIRATION RATE: 12 BRPM | HEART RATE: 74 BPM

## 2017-05-09 DIAGNOSIS — Z79.891 ENCOUNTER FOR LONG-TERM USE OF OPIATE ANALGESIC: ICD-10-CM

## 2017-05-09 DIAGNOSIS — R11.0 NAUSEA: ICD-10-CM

## 2017-05-09 DIAGNOSIS — F41.9 ANXIETY: Primary | ICD-10-CM

## 2017-05-09 DIAGNOSIS — G89.4 CHRONIC PAIN SYNDROME: Primary | ICD-10-CM

## 2017-05-09 DIAGNOSIS — G47.01 INSOMNIA DUE TO MEDICAL CONDITION: ICD-10-CM

## 2017-05-09 DIAGNOSIS — R63.4 UNEXPLAINED WEIGHT LOSS: ICD-10-CM

## 2017-05-09 DIAGNOSIS — R45.851 SUICIDAL IDEATION: ICD-10-CM

## 2017-05-09 PROCEDURE — 99214 OFFICE O/P EST MOD 30 MIN: CPT | Performed by: NURSE PRACTITIONER

## 2017-05-09 PROCEDURE — 90832 PSYTX W PT 30 MINUTES: CPT | Performed by: SOCIAL WORKER

## 2017-05-09 RX ORDER — AMOXICILLIN 250 MG
2 CAPSULE ORAL 2 TIMES DAILY
Qty: 120 TABLET | Refills: 1 | Status: SHIPPED | OUTPATIENT
Start: 2017-05-09 | End: 2017-07-09

## 2017-05-09 RX ORDER — ONDANSETRON 4 MG/1
4 TABLET, FILM COATED ORAL EVERY 8 HOURS PRN
Qty: 40 TABLET | Refills: 0 | Status: SHIPPED | OUTPATIENT
Start: 2017-05-09 | End: 2018-08-26

## 2017-05-09 RX ORDER — OXYCODONE HCL 20 MG/1
20 TABLET, FILM COATED, EXTENDED RELEASE ORAL EVERY 12 HOURS
Qty: 60 TABLET | Refills: 0 | Status: SHIPPED | OUTPATIENT
Start: 2017-05-09 | End: 2017-05-31

## 2017-05-09 RX ORDER — BUPRENORPHINE HYDROCHLORIDE, NALOXONE HYDROCHLORIDE 4; 1 MG/1; MG/1
FILM, SOLUBLE BUCCAL; SUBLINGUAL
Refills: 0 | COMMUNITY
Start: 2017-04-10 | End: 2017-09-18

## 2017-05-09 NOTE — PROGRESS NOTES
"SUBJECTIVE:                                                    Alka Mcfadden is a 56 year old female who presents to clinic today for the following health issues:  Patient presents to the clinic today with her .   [facility]: Vaughn Eldridge    Chronic Pain Follow Up  She states her pain is about the same but her tailbone pain has increased. Pt requesting to be put back on fentanyl patch which she was previously on, She would like to go back to 50 mg of the Fentanyl patch. Pt reports that the oxycodone is really not doing much for her pain. Pt reports taking the oxycodone every 4 hrs with no change in the pain level. She states that oxycodone takes the edge off for her but she is not able to function.. She says that the costs of medical marijuana are too much for her. Pt had previously been referred to addiction medicine for pain control. Dr. Alfredo saw pt 4/4/17 and recommended suboxone for pt. Pt was not ready to start the suboxone at the time because she does not want to go off her other pain meds for 24 hours.   Pt would like to remian on Oxycodone while she underwent testing at the [facility].     Pt reports getting rescheduled for her thyroid biopsy for  5/17/17.     Appetite - Pt states that she has to force herself to eat because of her pain. She notices that she does not eat as much. Alka also complains of nausea lately. She also states that she stopped taking Prilosec and all of her \"stomach medicine\". She switched to drinking tea and says she feels better. She says she now has no problems with phlegm and with her bowel movements. Pt denies any constipation or vomiting. She is taking Senna/Doccusate    Sleep - Pt states that she has only been getting 3 hours of sleep per night which is baseline with her sleep medication. Pt reports waking up due to pain.      Mental Health Follow up (Depression)  Alka states that she has had a lot of company lately and so she has been very busy. Her nieces have been " "visiting her. She reports trying to have fun and hide the pain. She admits feeling happy when seeing her family. Denies suicidal thoughts. Admits having a panic attack when she had her colonoscopy. She admits worrying about her situation but says that she has come to terms with her pain, she says \"it is what it is\" and she cannot fight it anymore.     Status since last visit: continues to have chronic pain    See PHQ-9 for current symptoms.  Other associated symptoms: None    Complicating factors:   Significant life event:  No   Current substance abuse:  None  Anxiety or Panic symptoms:  No          PHQ-9  English PHQ-9   Any Language             Amount of exercise or physical activity: None    Problems taking medications regularly: No    Medication side effects: none    Diet: regular (no restrictions)    Social History   Substance Use Topics     Smoking status: Light Tobacco Smoker     Types: Cigarettes     Smokeless tobacco: Never Used      Comment: 3-4 cigarettes per week.     Alcohol use No        Problem list and histories reviewed & adjusted, as indicated.  Additional history: as documented    Patient Active Problem List   Diagnosis     Ankylosing spondylitis (H)     CRPS (complex regional pain syndrome), lower limb     Encounter for long-term use of opiate analgesic     Hyperalgesia      ### Cataract, OU     Dry eyes     Hyperlipidemia LDL goal <130     Constipation     Insomnia     Tobacco use disorder     Open wound of knee, leg, and ankle     Anxiety     Chronic pain syndrome     Abdominal pain, right upper quadrant     Suicidal ideation     Nausea     Unexplained weight loss     Vaginal bleeding     Abdominal pain     Pancreatic atrophy     Haney's esophagus determined by biopsy     Chronic obstructive pulmonary disease (H) dx at [place]     Past Surgical History:   Procedure Laterality Date     ARTHROPLASTY REVISION HIP  2005, 2015     BACK SURGERY  1985/1990/1996     ENDOSCOPIC ULTRASOUND UPPER " GASTROINTESTINAL TRACT (GI) N/A 1/5/2017    Procedure: ENDOSCOPIC ULTRASOUND, ESOPHAGOSCOPY / UPPER GASTROINTESTINAL TRACT (GI);  Surgeon: Esteban Mortensen MD;  Location: [facility] OR     ESOPHAGOSCOPY, GASTROSCOPY, DUODENOSCOPY (EGD), COMBINED N/A 1/5/2017    Procedure: COMBINED ESOPHAGOSCOPY, GASTROSCOPY, DUODENOSCOPY (EGD);  Surgeon: Esteban Mortensen MD;  Location: [facility] OR     JOINT REPLACEMENT  1985       Social History   Substance Use Topics     Smoking status: Light Tobacco Smoker     Types: Cigarettes     Smokeless tobacco: Never Used      Comment: 3-4 cigarettes per week.     Alcohol use No     History reviewed. No pertinent family history.      Current Outpatient Prescriptions   Medication Sig Dispense Refill     SUBOXONE 4-1 MG per film PLACE 1 FILM UNDER THE TONGUE BID.  0     temazepam (RESTORIL) 30 MG capsule TAKE 1 CAPSULE BY MOUTH EVERY NIGHT AT BEDTIME AS NEEDED FOR SLEEP 30 capsule 0     oxyCODONE (ROXICODONE) 10 MG IR tablet Take 1 tablet (10 mg) by mouth every 6 hours as needed for moderate to severe pain 90 tablet 0     albuterol (PROAIR HFA/PROVENTIL HFA/VENTOLIN HFA) 108 (90 BASE) MCG/ACT Inhaler Inhale 2 puffs into the lungs every 4 hours as needed for shortness of breath / dyspnea or wheezing 1 Inhaler 3     ADVAIR DISKUS 250-50 MCG/DOSE diskus inhaler INHALE 1 PUFF PO Q 12 HOURS .TK ON A SCHEDULED BASIS FOR COPD  0     diazepam (VALIUM) 10 MG tablet Take 0.5-1 tablets (5-10 mg) by mouth every 12 hours as needed for anxiety or sleep 60 tablet 0     omeprazole (PRILOSEC) 40 MG capsule Take 1 capsule (40 mg) by mouth daily Take 30-60 minutes before a meal. 30 capsule 1     Allergies   Allergen Reactions     Amoxicillin-Pot Clavulanate Rash     Augmentin Rash     Amitriptyline      Had reaction while on this and vistaril; was just not herself.     Hydroxyzine Other (See Comments)     Had reaction when took this along with amitriptyline.     Vistaril      Had reaction when took this along with  amitriptyline.     Recent Labs   Lab Test  02/02/17   1657  01/05/17   0904   12/01/16   1349  12/01/16   0937  10/09/14   LDL   --    --    --    --    --    --   126   HDL   --    --    --    --    --    --   39   TRIG   --    --    --    --    --    --   137   ALT  30  16   --    --   15   < >   --    CR  0.75  0.64   < >   --   0.68   < >   --    GFRESTIMATED  79  >90  Non  GFR Calc     < >   --   89   < >   --    GFRESTBLACK  >90   GFR Calc    >90   GFR Calc     < >   --   >90   GFR Calc     < >   --    POTASSIUM  4.4  4.2   < >   --   3.7   < >   --    TSH  0.70   --    --   0.34*   --    < >   --     < > = values in this interval not displayed.      BP Readings from Last 3 Encounters:   05/09/17 94/66   04/04/17 124/75   03/30/17 134/71    Wt Readings from Last 3 Encounters:   05/09/17 46.5 kg (102 lb 8 oz)   03/30/17 46.5 kg (102 lb 8 oz)   03/16/17 47.4 kg (104 lb 8 oz)        Labs reviewed in EPIC  Problem list, Medication list, Allergies, and Medical/Social/Surgical histories reviewed in [facility] and updated as appropriate.     ROS: 10 point ROS neg other than the symptoms noted above in the HPI.    This document serves as a record of the services and decisions personally performed and made by Shanda Vo CNP. It was created on her behalf by Faith Madsen, a trained medical scribe. The creation of this document is based on the provider's statements to the medical scribe.  Faith Madsen 9:49 AM 5/9/2017    OBJECTIVE:                                                    BP 94/66  Pulse 74  Temp 98  F (36.7  C) (Oral)  Resp 12  Wt 46.5 kg (102 lb 8 oz)  SpO2 95%  BMI 17.06 kg/m2  Body mass index is 17.06 kg/(m^2).  GENERAL:Very thin,  alert, well nourished, well hydrated, fatigued  EYES: Eyes grossly normal to inspection, PERRL and conjunctivae and sclerae normal  NECK: no adenopathy, no asymmetry, masses, or scars and thyroid normal to  palpation  RESP: lungs Faint wheezes through out otherwise clear to auscultation - no rales, rhonchi  CV: regular rate and rhythm, normal S1 S2, no S3 or S4, no murmur, click or rub, no peripheral edema and peripheral pulses strong  ABDOMEN: thin, pain and pressure with palpation of upper and lower abdomen, especially right/left upper quad.    MS: Hardware from previous spinal surgery visible in outline through the skin on back, no edemaMENTAL HEALTH  Appearance:  awake, alert and adequately groomed  Attitude:  cooperative  Eye Contact:  good  Mood:  depressed  Affect: good  Speech:  clear, coherent  Psychomotor Behavior:  no evidence of tardive dyskinesia, dystonia, or tics  Thought Process:  logical and linear  Associations:  no loose associations  Thought Content:  no evidence of suicidal ideation or homicidal ideation and no evidence of psychotic thought  Insight:  good  Judgment:  good  Oriented to:  time, person, and place  Attention Span and Concentration:  normal  Recent and Remote Memory:  intact  Fund of Knowledge: appropriate  Muscle Strength and Tone: normal   See [facility] notes    Diagnostic Test Results:  none      ASSESSMENT/PLAN:                                                    (G89.4) Chronic pain syndrome  (primary encounter diagnosis)  Comment: Pt denies any pain relief with current oxycodone dose. Requesting pain medication change.   Plan: oxyCODONE (OXYCONTIN) 20 MG 12 hr tablet        Continue Oxycodone 10 mg IR every 6 hr as needed for break through pain.   Declining change to Fentanyl patches secondary to low adipose tissue mass on her body.  Likely would have poor absorption.      (G47.01) Insomnia due to medical condition  Comment: Baseline of 3 hours of sleep per night, awakens to pain.   Plan: Adjust pain medication as noted above. Will approach improved pain medication first and then address sleep medication as needed.     (R63.4) Unexplained weight loss  Comment: Weight remains stable 102  lbs (3/30/17), Has had an extensive work up, Colon, upper GI, Mammogram, Blood work, Thyroid mass detected, Biopsy pending at [place] this month.   Plan: will follow up at [place] [facility] 5/17/17    (R11.0) Nausea  Comment: Denies any vomiting, self-discontinued Prilosec.Denies Constipation   Most likely related to chronic opioid use.   Plan: ondansetron (ZOFRAN) 4 MG tablet        Re-ordered senna/docusate, discussed constipation prevention.     (Z79.891) Encounter for long-term use of opiate analgesic  Comment: Pain not well managed.   Plan: oxyCODONE (OXYCONTIN) 20 MG 12 hr tablet,         senna-docusate (SENOKOT-S;PERICOLACE) 8.6-50 MG        per tablet        Continue oxycodone 10 mg every 6 hrs as needed for break through pain.      Patient Instructions:  Take 20 mg Oxycontin every 12 hours   Take 10 mg Oxycodone every 6 hours as needed.   Ordered Zofran for nausea.   Ordered Senna.  Follow- up after 5/17 appt.         The information in this document, created by the medical scribe, Faith Madsen, for me, accurately reflects the services I personally performed and the decisions made by me. I have reviewed and approved this document for accuracy prior to leaving the patient care area.  BOBBY Butcher [facility]

## 2017-05-09 NOTE — MR AVS SNAPSHOT
"              After Visit Summary   5/9/2017    Alka Mcfadden    MRN: [id number]           Patient Information     Date Of Birth          1960        Visit Information        Provider Department      5/9/2017 9:30 AM Vaughn Eldridge, [facility]        Today's Diagnoses     Anxiety    -  1    Suicidal ideation           Follow-ups after your visit        Your next 10 appointments already scheduled     May 30, 2017 11:00 AM CDT   Return Visit with BOBBY Balbuena CNP   [facility]    [address]  [address]  [facility] [id number] [phone]            May 30, 2017 11:00 AM CDT   Return Visit with CHERY Bahena   [facility] ([facility]    [address]  [address]  [facility] [id number] [phone]              Who to contact     If you have questions or need follow up information about today's clinic visit or your schedule please contact [facility] directly at [phone].  Normal or non-critical lab and imaging results will be communicated to you by Activehourshart, letter or phone within 4 business days after the clinic has received the results. If you do not hear from us within 7 days, please contact the clinic through [facility] or phone. If you have a critical or abnormal lab result, we will notify you by phone as soon as possible.  Submit refill requests through [employer] or call your pharmacy and they will forward the refill request to us. Please allow 3 business days for your refill to be completed.          Additional Information About Your Visit        Activehourshart Information     Wallix lets you send messages to your doctor, view your test results, renew your prescriptions, schedule appointments and more. To sign up, go to [email or web address] . Click on \"Log in\" on the " "left side of the screen, which will take you to the Welcome page. Then click on \"Sign up Now\" on the right side of the page.     You will be asked to enter the access code listed below, as well as some personal information. Please follow the directions to create your username and password.     Your access code is: [email or web address]  Expires: 2017  4:56 PM     Your access code will  in 90 days. If you need help or a new code, please call your [facility] or [phone].        Care EveryWhere ID     This is your Care EveryWhere ID. This could be used by other organizations to access your [facility] medical records  [id number]         Blood Pressure from Last 3 Encounters:   17 94/66   17 124/75   17 134/71    Weight from Last 3 Encounters:   17 102 lb 8 oz (46.5 kg)   17 102 lb 8 oz (46.5 kg)   17 104 lb 8 oz (47.4 kg)              Today, you had the following     No orders found for display         Today's Medication Changes          These changes are accurate as of: 17  3:47 PM.  If you have any questions, ask your nurse or doctor.               Start taking these medicines.        Dose/Directions    ondansetron 4 MG tablet   Commonly known as:  ZOFRAN   Used for:  Nausea   Started by:  Shanda Vo APRN CNP        Dose:  4 mg   Take 1 tablet (4 mg) by mouth every 8 hours as needed for nausea   Quantity:  40 tablet   Refills:  0       senna-docusate 8.6-50 MG per tablet   Commonly known as:  SENOKOT-S;PERICOLACE   Used for:  Encounter for long-term use of opiate analgesic   Started by:  Shanda Vo APRN CNP        Dose:  2 tablet   Take 2 tablets by mouth 2 times daily   Quantity:  120 tablet   Refills:  1         These medicines have changed or have updated prescriptions.        Dose/Directions    * oxyCODONE 10 MG IR tablet   Commonly known as:  ROXICODONE   This may have changed:  Another medication with the same name was added. Make sure you " understand how and when to take each.   Used for:  Chronic pain syndrome, Encounter for long-term use of opiate analgesic   Changed by:  Shanda Vo APRN CNP        Dose:  10 mg   Take 1 tablet (10 mg) by mouth every 6 hours as needed for moderate to severe pain   Quantity:  90 tablet   Refills:  0       * oxyCODONE 20 MG 12 hr tablet   Commonly known as:  OxyCONTIN   This may have changed:  You were already taking a medication with the same name, and this prescription was added. Make sure you understand how and when to take each.   Used for:  Chronic pain syndrome, Encounter for long-term use of opiate analgesic   Changed by:  Shanda Vo APRN CNP        Dose:  20 mg   Take 1 tablet (20 mg) by mouth every 12 hours maximum 2 tablet(s) per day   Quantity:  60 tablet   Refills:  0       * Notice:  This list has 2 medication(s) that are the same as other medications prescribed for you. Read the directions carefully, and ask your doctor or other care provider to review them with you.         Where to get your medicines      These medications were sent to [facility] [address] [place] [address], [address], [facility] [address]     Phone:  [phone]     ondansetron 4 MG tablet    senna-docusate 8.6-50 MG per tablet         Some of these will need a paper prescription and others can be bought over the counter.  Ask your nurse if you have questions.     Bring a paper prescription for each of these medications     oxyCODONE 20 MG 12 hr tablet                Primary Care Provider Office Phone # Fax #    BOBBY Balbuena [phone]       [facility] [address] [facility] [address]  [facility] [address]        Thank you!     Thank you for choosing [facility]  for your care. Our goal is always to provide you with excellent care. Hearing back from our patients is one way we can  continue to improve our services. Please take a few minutes to complete the written survey that you may receive in the mail after your visit with us. Thank you!             Your Updated Medication List - Protect others around you: Learn how to safely use, store and throw away your medicines at www.disposemymeds.org.          This list is accurate as of: 5/9/17  3:47 PM.  Always use your most recent med list.                   Brand Name Dispense Instructions for use    ADVAIR DISKUS 250-50 MCG/DOSE diskus inhaler   Generic drug:  fluticasone-salmeterol      INHALE 1 PUFF PO Q 12 HOURS .TK ON A SCHEDULED BASIS FOR COPD       albuterol 108 (90 BASE) MCG/ACT Inhaler    PROAIR HFA/PROVENTIL HFA/VENTOLIN HFA    1 Inhaler    Inhale 2 puffs into the lungs every 4 hours as needed for shortness of breath / dyspnea or wheezing       diazepam 10 MG tablet    VALIUM    60 tablet    Take 0.5-1 tablets (5-10 mg) by mouth every 12 hours as needed for anxiety or sleep       omeprazole 40 MG capsule    priLOSEC    30 capsule    Take 1 capsule (40 mg) by mouth daily Take 30-60 minutes before a meal.       ondansetron 4 MG tablet    ZOFRAN    40 tablet    Take 1 tablet (4 mg) by mouth every 8 hours as needed for nausea       * oxyCODONE 10 MG IR tablet    ROXICODONE    90 tablet    Take 1 tablet (10 mg) by mouth every 6 hours as needed for moderate to severe pain       * oxyCODONE 20 MG 12 hr tablet    OxyCONTIN    60 tablet    Take 1 tablet (20 mg) by mouth every 12 hours maximum 2 tablet(s) per day       senna-docusate 8.6-50 MG per tablet    SENOKOT-S;PERICOLACE    120 tablet    Take 2 tablets by mouth 2 times daily       SUBOXONE 4-1 MG per film   Generic drug:  buprenorphine HCl-naloxone HCl      PLACE 1 FILM UNDER THE TONGUE BID.       temazepam 30 MG capsule    RESTORIL    30 capsule    TAKE 1 CAPSULE BY MOUTH EVERY NIGHT AT BEDTIME AS NEEDED FOR SLEEP       * Notice:  This list has 2 medication(s) that are the same as other  medications prescribed for you. Read the directions carefully, and ask your doctor or other care provider to review them with you.

## 2017-05-09 NOTE — MR AVS SNAPSHOT
"              After Visit Summary   5/9/2017    Alka Mcfadden    MRN: [id number]           Patient Information     Date Of Birth          1960        Visit Information        Provider Department      5/9/2017 9:00 AM Shanda Vo APRN CNP [facility]        Today's Diagnoses     Insomnia due to medical condition    -  1    Chronic pain syndrome        Unexplained weight loss        Nausea        Encounter for long-term use of opiate analgesic          Care Instructions    Take 20 mg Oxycontin every 12 hours     Take 10 mg Oxycodone every 6 hours as needed.     Ordered Zofran for nausea.     Ordered Senna.    Follow- up after 5/17 appt.         Follow-ups after your visit        Who to contact     If you have questions or need follow up information about today's clinic visit or your schedule please contact [facility] directly at [phone].  Normal or non-critical lab and imaging results will be communicated to you by Hex[employer]art, letter or phone within 4 business days after the clinic has received the results. If you do not hear from us within 7 days, please contact the clinic through Hexag[employer]art or phone. If you have a critical or abnormal lab result, we will notify you by phone as soon as possible.  Submit refill requests through [employer] or call your pharmacy and they will forward the refill request to us. Please allow 3 business days for your refill to be completed.          Additional Information About Your Visit        Hexagohart Information     [employer] lets you send messages to your doctor, view your test results, renew your prescriptions, schedule appointments and more. To sign up, go to [email or web address] . Click on \"Log in\" on the left side of the screen, which will take you to the Welcome page. Then click on \"Sign up Now\" on the right side of the page.     You will be asked to enter the access code listed below, as well as some personal " information. Please follow the directions to create your username and password.     Your access code is: [email or web address]  Expires: 2017  4:56 PM     Your access code will  in 90 days. If you need help or a new code, please call your [facility] clinic or [phone].        Care EveryWhere ID     This is your Care EveryWhere ID. This could be used by other organizations to access your [facility] medical records  [id number]        Your Vitals Were     Pulse Temperature Respirations Pulse Oximetry BMI (Body Mass Index)       74 98  F (36.7  C) (Oral) 12 95% 17.06 kg/m2        Blood Pressure from Last 3 Encounters:   17 94/66   17 124/75   17 134/71    Weight from Last 3 Encounters:   17 102 lb 8 oz (46.5 kg)   17 102 lb 8 oz (46.5 kg)   17 104 lb 8 oz (47.4 kg)              Today, you had the following     No orders found for display         Today's Medication Changes          These changes are accurate as of: 17 10:05 AM.  If you have any questions, ask your nurse or doctor.               Start taking these medicines.        Dose/Directions    ondansetron 4 MG tablet   Commonly known as:  ZOFRAN   Used for:  Nausea   Started by:  Shanda Vo APRN CNP        Dose:  4 mg   Take 1 tablet (4 mg) by mouth every 8 hours as needed for nausea   Quantity:  40 tablet   Refills:  0       senna-docusate 8.6-50 MG per tablet   Commonly known as:  SENOKOT-S;PERICOLACE   Used for:  Encounter for long-term use of opiate analgesic   Started by:  Shanda Vo APRN CNP        Dose:  2 tablet   Take 2 tablets by mouth 2 times daily   Quantity:  120 tablet   Refills:  1         These medicines have changed or have updated prescriptions.        Dose/Directions    * oxyCODONE 10 MG IR tablet   Commonly known as:  ROXICODONE   This may have changed:  Another medication with the same name was added. Make sure you understand how and when to take each.   Used for:  Chronic pain  syndrome, Encounter for long-term use of opiate analgesic   Changed by:  Shanda Vo APRN CNP        Dose:  10 mg   Take 1 tablet (10 mg) by mouth every 6 hours as needed for moderate to severe pain   Quantity:  90 tablet   Refills:  0       * oxyCODONE 20 MG 12 hr tablet   Commonly known as:  OxyCONTIN   This may have changed:  You were already taking a medication with the same name, and this prescription was added. Make sure you understand how and when to take each.   Used for:  Chronic pain syndrome, Encounter for long-term use of opiate analgesic   Changed by:  Shanda Vo APRN CNP        Dose:  20 mg   Take 1 tablet (20 mg) by mouth every 12 hours maximum 2 tablet(s) per day   Quantity:  60 tablet   Refills:  0       * Notice:  This list has 2 medication(s) that are the same as other medications prescribed for you. Read the directions carefully, and ask your doctor or other care provider to review them with you.         Where to get your medicines      These medications were sent to [facility] [address] [place] [address] AT [address] [address]     Phone:  [phone]     ondansetron 4 MG tablet    senna-docusate 8.6-50 MG per tablet         Some of these will need a paper prescription and others can be bought over the counter.  Ask your nurse if you have questions.     Bring a paper prescription for each of these medications     oxyCODONE 20 MG 12 hr tablet                Primary Care Provider Office Phone # Fax #    BOBBY Balbuena [phone]       [facility] [address] [facility] [address]  [facility] [address]        Thank you!     Thank you for choosing [facility]  for your care. Our goal is always to provide you with excellent care. Hearing back from our patients is one way we can continue to improve our services. Please take a few minutes to  complete the written survey that you may receive in the mail after your visit with us. Thank you!             Your Updated Medication List - Protect others around you: Learn how to safely use, store and throw away your medicines at www.disposemymeds.org.          This list is accurate as of: 5/9/17 10:05 AM.  Always use your most recent med list.                   Brand Name Dispense Instructions for use    ADVAIR DISKUS 250-50 MCG/DOSE diskus inhaler   Generic drug:  fluticasone-salmeterol      INHALE 1 PUFF PO Q 12 HOURS .TK ON A SCHEDULED BASIS FOR COPD       albuterol 108 (90 BASE) MCG/ACT Inhaler    PROAIR HFA/PROVENTIL HFA/VENTOLIN HFA    1 Inhaler    Inhale 2 puffs into the lungs every 4 hours as needed for shortness of breath / dyspnea or wheezing       diazepam 10 MG tablet    VALIUM    60 tablet    Take 0.5-1 tablets (5-10 mg) by mouth every 12 hours as needed for anxiety or sleep       omeprazole 40 MG capsule    priLOSEC    30 capsule    Take 1 capsule (40 mg) by mouth daily Take 30-60 minutes before a meal.       ondansetron 4 MG tablet    ZOFRAN    40 tablet    Take 1 tablet (4 mg) by mouth every 8 hours as needed for nausea       * oxyCODONE 10 MG IR tablet    ROXICODONE    90 tablet    Take 1 tablet (10 mg) by mouth every 6 hours as needed for moderate to severe pain       * oxyCODONE 20 MG 12 hr tablet    OxyCONTIN    60 tablet    Take 1 tablet (20 mg) by mouth every 12 hours maximum 2 tablet(s) per day       senna-docusate 8.6-50 MG per tablet    SENOKOT-S;PERICOLACE    120 tablet    Take 2 tablets by mouth 2 times daily       SUBOXONE 4-1 MG per film   Generic drug:  buprenorphine HCl-naloxone HCl      PLACE 1 FILM UNDER THE TONGUE BID.       temazepam 30 MG capsule    RESTORIL    30 capsule    TAKE 1 CAPSULE BY MOUTH EVERY NIGHT AT BEDTIME AS NEEDED FOR SLEEP       * Notice:  This list has 2 medication(s) that are the same as other medications prescribed for you. Read the directions carefully,  and ask your doctor or other care provider to review them with you.

## 2017-05-09 NOTE — PROGRESS NOTES
[facility]   May 9, 2017      Behavioral Health Clinician Progress Note    Patient Name: Alka Mcfadden           Service Type: Individual      Service Location:   Face to Face in Clinic     Session Start Time: 9:34am  Session End Time: 9:54am      Session Length: 16 - 37      Attendees: Patient and Spouse / Significant Other    Visit Activities (Refresh list every visit): [facility]    Diagnostic Assessment Date: not completed yet  Treatment Plan Review Date: not completed yet  See Flowsheets for today's PHQ-9 and JAMAL-7 results  Previous PHQ-9:   PHQ-9 SCORE 12/8/2015 7/15/2016 12/5/2016   Total Score - - -   Total Score - - -   Total Score 0 0 0     Previous JAMAL-7:   JAMAL-7 SCORE 12/8/2015 7/15/2016 12/5/2016   Total Score - - -   Total Score 0 0 0   Total Score - - -       KENDRA LEVEL:  No flowsheet data found.    DATA  Extended Session (60+ minutes): No  Interactive Complexity: No  Crisis: No    Treatment Objective(s) Addressed in This Session:  Target Behavior(s): disease management/lifestyle changes mental health    Depressed Mood: Increase interest, engagement, and pleasure in doing things  Decrease frequency and intensity of feeling down, depressed, hopeless  Identify negative self-talk and behaviors: challenge core beliefs, myths, and actions  Improve concentration, focus, and mindfulness in daily activities   Anxiety: will experience a reduction in anxiety, will develop more effective coping skills to manage anxiety symptoms, will develop healthy cognitive patterns and beliefs and will increase ability to function adaptively  Psychological distress related to Pain  Psychological distress related to Chronic Disease Management    Current Stressors / Issues:  The patient reported that her medical procedure has been pushed back to May 17.  Regarding her pain the patient reported having unmanageable pain-her pain has been the same with break through pain-she expressed desire to look  "at her pain medications with PCP-they came to an agreement on the pain medication change and went over the directions for use-the patient has some concerns about starting suboxone as she has intense fear of having to go 24 hours with no pain medication control before she would be able to start the medication-regarding sleep the patient reported to sleep about 3 hours a night as she wakes due to the pain and feeling uncomfortable and she has difficulty falling asleep-regarding appetite the patient reported having low appetite and having to force the self to eat.  The patient stated that she has been distracted by having family over to there home recently that she has not had time to think about being depressed-she also expressed having moments of carlos with her family-regarding anxiety the patient reported no panic attacks and to having some worries but she was able to state \"It is what it is\" as she stated that she \"is going to stop fighting it.\"        Progress on Treatment Objective(s) / Homework:  No improvement - PREPARATION (Decided to change - considering how); Intervened by negotiating a change plan and determining options / strategies for behavior change, identifying triggers, exploring social supports, and working towards setting a date to begin behavior change    Motivational Interviewing    MI Intervention: Expressed Empathy/Understanding, Supported Autonomy, Collaboration, Evocation, Permission to raise concern or advise, Open-ended questions, Reflections: simple and complex, Rolled with resistance: Simple reflection, Complex reflection and Evoked patient agenda and Change talk (evoked)     Change Talk Expressed by the Patient: Desire to change Ability to change Reasons to change Need to change Committment to change Activation Taking steps    Provider Response to Change Talk: E - Evoked more info from patient about behavior change, A - Affirmed patient's thoughts, decisions, or attempts at behavior change, " R - Reflected patient's change talk and S - Summarized patient's change talk statements      Care Plan review completed: No    Medication Review:  No changes to current psychiatric medication(s)    Medication Compliance:  NA    Changes in Health Issues:   Yes: Pain, Associated Psychological Distress    Chemical Use Review:   Substance Use: Chemical use reviewed, no active concerns identified      Tobacco Use: No current tobacco use.      Assessment: Current Emotional / Mental Status (status of significant symptoms):  Risk status (Self / Other harm or suicidal ideation)  Patient denies a history of suicidal ideation, suicide attempts, self-injurious behavior, homicidal ideation, homicidal behavior and and other safety concerns  Patient denies current fears or concerns for personal safety.  Patient denies current or recent suicidal ideation or behaviors.  Patient denies current or recent homicidal ideation or behaviors.  Patient denies current or recent self injurious behavior or ideation.  Patient denies other safety concerns.  A safety and risk management plan has not been developed at this time, however patient was encouraged to call [place] [phone] should there be a change in any of these risk factors.    Appearance:   Appropriate   Eye Contact:   Good   Psychomotor Behavior: Normal   Attitude:   Cooperative   Orientation:   All  Speech   Rate / Production: Normal    Volume:  Normal   Mood:    Normal  Affect:    Appropriate   Thought Content:  Clear   Thought Form:  Coherent  Goal Directed  Logical   Insight:    Good     Diagnoses:  1. Anxiety    2. Suicidal ideation        Collateral Reports Completed:  Not Applicable    Plan: (Homework, other):  Patient was given information about behavioral services and encouraged to schedule a follow up appointment with the clinic [facility] as needed.  She was also given information about mental health symptoms and treatment options .  CD Recommendations: No indications of CD  issues.  Don Eldridge, LICSW, BHC      ______________________________________________________________________

## 2017-05-09 NOTE — NURSING NOTE
"Chief Complaint   Patient presents with     RECHECK       Initial BP 94/66  Pulse 74  Temp 98  F (36.7  C) (Oral)  Resp 12  Wt 102 lb 8 oz (46.5 kg)  SpO2 95%  BMI 17.06 kg/m2 Estimated body mass index is 17.06 kg/(m^2) as calculated from the following:    Height as of 1/5/17: 5' 5\" (1.651 m).    Weight as of this encounter: 102 lb 8 oz (46.5 kg).  Medication Reconciliation: complete     Oswaldo Santiago MA    "

## 2017-05-09 NOTE — PATIENT INSTRUCTIONS
Take 20 mg Oxycontin every 12 hours     Take 10 mg Oxycodone every 6 hours as needed.     Ordered Zofran for nausea.     Ordered Senna.    Follow- up after 5/17 appt.

## 2017-05-18 ENCOUNTER — TELEPHONE (OUTPATIENT)
Dept: FAMILY MEDICINE | Facility: CLINIC | Age: 57
End: 2017-05-18

## 2017-05-18 NOTE — TELEPHONE ENCOUNTER
Pt stated that she does not want an Appt.  Pt stated that she usually has this as a back up medication for when this happens.      Pt state that she is seeing Marlin on the 30th and would like this medication sooner.    Pt stated that this clinic has records from [facility] with this as a rescue medication.    Nickolas Dempsey RN

## 2017-05-18 NOTE — TELEPHONE ENCOUNTER
Reason for Call:  prescription    Detailed comments: Pt would like a prescription for Levofloxacin 500 MG once daily. Pt states she is spitting up green. Pt did not go to the [facility] for a thyroid biopsy because she was too ill and tired. Pt is also wondering how long the process for hospice takes.     Phone Number Patient can be reached at: Home number on file [phone] (home)    Best Time: Anytime    Can we leave a detailed message on this number? YES    Call taken on 5/18/2017 at 2:48 PM by Deloris Leon

## 2017-05-23 NOTE — TELEPHONE ENCOUNTER
This medication is not listed on records from [place].    Pt declined to schedule appointment for this concern.    Pt has follow-up appointment scheduled with Shanda.    Closing encounter; no further action needed.      ROMAN MatsonN, RN  [facility]

## 2017-05-31 DIAGNOSIS — G89.4 CHRONIC PAIN SYNDROME: ICD-10-CM

## 2017-05-31 DIAGNOSIS — Z79.891 ENCOUNTER FOR LONG-TERM USE OF OPIATE ANALGESIC: ICD-10-CM

## 2017-05-31 NOTE — TELEPHONE ENCOUNTER
Problem List Complete:  Yes        Clinic visit frequency required: unspecified     Future Office visit:   Next 5 appointments (look out 90 days)     Jun 12, 2017  3:00 PM CDT   [facility] with CHERY Bahena   [facility]    [address]  [address]  [facility] [address]   [phone]            Jun 12, 2017  3:00 PM CDT   Return Visit with BOBBY Balbuena CNP   [facility] ([facility]    [address]  [address]  [facility] [address]   [phone]                  Controlled substance agreement on file: Yes:  Date 4/28/2015.     Processing:  hard copy must be picked up in clinic   checked in past 6 months?  No, route to RN [place]  review 5/31/2017 10:12 AM    Oxycontin 20 mg last refill 5/9/2017    Oxycodone 10 mg 4/27/2017    Writer notes no concerns at this time    Thank you,  Marisel Guardado RN

## 2017-05-31 NOTE — TELEPHONE ENCOUNTER
Incoming call from pt requesting refills. Pt would like prescription mailed to [facility].     oxyCODONE (OXYCONTIN) 20 MG       Last Written Prescription Date: 05/09/17  Last Fill Quantity: 60,  # refills: 0   Last Office Visit with [facility], [facility] or [facility] prescribing provider: 05/09/17                                         Next 5 appointments (look out 90 days)     Jun 12, 2017  3:00 PM CDT   Return Visit with Vaughn Eldridge [facility] ([facility])    [address]  [address]  [facility] [address]   [phone]            Jun 12, 2017  3:00 PM CDT   Return Visit with BOBBY Balbuena CNP   [facility] ([facility])    [address]  [address]  [facility] [address] [phone]                oxyCODONE (ROXICODONE) 10 MG      Last Written Prescription Date: 04/21/17  Last Fill Quantity: 90,  # refills: 0   Last Office Visit with [facility], [facility] or [facility] prescribing provider: 05/09/17                                         Next 5 appointments (look out 90 days)     Jun 12, 2017  3:00 PM CDT   Return Visit with Vaughn Eldridge, [facility] ([facility])    [address]  [address]  [facility] [address]   [phone]            Jun 12, 2017  3:00 PM CDT   Return Visit with BOBBY Balbuena CNP   [facility] ([facility])    [address]  [address]  [facility] [address]   [phone]

## 2017-06-01 RX ORDER — OXYCODONE HYDROCHLORIDE 10 MG/1
10 TABLET ORAL EVERY 6 HOURS PRN
Qty: 90 TABLET | Refills: 0 | Status: SHIPPED | OUTPATIENT
Start: 2017-06-01 | End: 2017-06-27

## 2017-06-01 RX ORDER — OXYCODONE HCL 20 MG/1
20 TABLET, FILM COATED, EXTENDED RELEASE ORAL EVERY 12 HOURS
Qty: 60 TABLET | Refills: 0 | Status: SHIPPED | OUTPATIENT
Start: 2017-06-01 | End: 2017-07-13

## 2017-06-05 DIAGNOSIS — M45.9 ANKYLOSING SPONDYLITIS (H): ICD-10-CM

## 2017-06-05 DIAGNOSIS — G89.4 CHRONIC PAIN SYNDROME: ICD-10-CM

## 2017-06-05 NOTE — TELEPHONE ENCOUNTER
Routing refill request to provider for review/approval because:  Drug not on the [facility] refill protocol           temazepam (RESTORIL) 30 MG capsule      Last Written Prescription Date: 5/1/17  Last Fill Quantity: 30,  # refills: 0   Last Office Visit with [facility], [facility] or [facility] prescribing provider: 5/9/17                        Next 5 appointments (look out 90 days)     Jun 12, 2017  3:00 PM CDT   Return Visit with CHERY Bahena   [facility] ([facility])    [address]  [address]  [facility] [address]   [phone]            Jun 12, 2017  3:00 PM CDT   Return Visit with BOBBY Balbuena CNP   [facility] ([facility])    [address]  [address]  [facility] [address]   [phone]                    Nickolas Dempsey RN

## 2017-06-06 DIAGNOSIS — G89.28 OTHER CHRONIC POSTPROCEDURAL PAIN: ICD-10-CM

## 2017-06-06 DIAGNOSIS — F41.9 ANXIETY: ICD-10-CM

## 2017-06-06 RX ORDER — DULOXETIN HYDROCHLORIDE 30 MG/1
30 CAPSULE, DELAYED RELEASE ORAL 2 TIMES DAILY
Qty: 60 CAPSULE | Refills: 6 | Status: SHIPPED | OUTPATIENT
Start: 2017-06-06 | End: 2017-06-06

## 2017-06-06 RX ORDER — TEMAZEPAM 30 MG
CAPSULE ORAL
Qty: 30 CAPSULE | Refills: 0 | Status: SHIPPED | OUTPATIENT
Start: 2017-06-06 | End: 2017-07-20

## 2017-06-06 NOTE — TELEPHONE ENCOUNTER
DULoxetine (CYMBALTA) 30 MG capsule (Discontinued)    Last Written Prescription Date: 6/2/2016  Last Fill Quantity: 60, # refills: 6  Last Office Visit with G, P or [facility] prescribing provider: 5/9/2017   Next 5 appointments (look out 90 days)     Jun 12, 2017  3:00 PM CDT   Return Visit with CHERY Bahena   [facility] ([facility])    [address]  [facility] [address]   [phone]            Jun 12, 2017  3:00 PM CDT   Return Visit with BOBBY Balbuena CNP   [facility] ([facility])    [address]  [address]  [facility] [address]   [phone]                   BP Readings from Last 3 Encounters:   05/09/17 94/66   04/04/17 124/75   03/30/17 134/71     Pulse: (for Fetzima)  Creatinine   Date Value Ref Range Status   02/02/2017 0.75 0.52 - 1.04 mg/dL Final   ]    Last PHQ-9 score on record=   PHQ-9 SCORE 12/5/2016   Total Score -   Total Score 0     Routing refill request to provider for review/approval because:  Drug not active on patient's medication list      Thank you,  Marisel Guardado RN

## 2017-06-06 NOTE — TELEPHONE ENCOUNTER
Called and confirmed with patient that she is not taking Cymbalta and has no interest in re starting the medication, the patient states it made her feel more depressed. Patient wanted to let provider know that [facility], she states she is going to talk it over with her mother and come up with a plan next week on if she will reschedule this patient, patient states she is contemplating if it is worth it, driving all the way down there etc.  Bhumi Sanchez RN

## 2017-06-06 NOTE — TELEPHONE ENCOUNTER
She has not been taking cymbalta to my knowledge, for several months, even has far back as September 2016.  Has she been taking the cymbalta in the recent past?   If she has been off it for a while she should go back up slowly.  I would prefer that we meet about this before she would start again.  At the very least she should start back on 30 mg daily for 2 weeks and then check back in about increasing the dose.     Shanda Vo CNP, APNP  [facility]

## 2017-06-06 NOTE — TELEPHONE ENCOUNTER
Fax received 06/05/17     Reason for Call:  Medication refill:    Name of the medication requested: Duloxetine DR 30 MG    Can we leave a detailed message on this number? NO    Phone number patient can be reached at: Home number on file [phone] (home)    Best Time: Anytime    Call taken on 6/6/2017 at 8:47 AM by Deloris Leon

## 2017-06-06 NOTE — TELEPHONE ENCOUNTER
Called patient to discuss cymbalta use, no answer, left VM requesting call back. Called pharmacy to follow up on if refill request was submitted automatically by pharmacy or originated by patient, pharmacy states patient initiated request.  Bhumi Sanchez RN

## 2017-06-08 NOTE — TELEPHONE ENCOUNTER
Will forward to PCP to review.  No Note regarding Rx's being sent.  Rx's not at .      Rx's not filled per [place] .    Marlin are you willing to re-write these Rx's so pt's  can pick them up tomorrow?    Nickolas Dempsey RN

## 2017-06-08 NOTE — TELEPHONE ENCOUNTER
Will not refill the cymbalta at this time.  Curious that there was even a request for cymbalta.  Shanda Vo, CNP, APNP  [facility]

## 2017-06-08 NOTE — TELEPHONE ENCOUNTER
Reprinted Prescriptions and given to Clinic RN.   Shanda Vo, CNP, APNP  [facility]

## 2017-06-08 NOTE — TELEPHONE ENCOUNTER
Incoming call from pt stating that [facility] has not received a paper copy of her prescription via mail. Pt would like her  to  prescription tomorrow, 06/09/17.     Deloris Leon

## 2017-06-12 ENCOUNTER — RADIANT APPOINTMENT (OUTPATIENT)
Dept: MAMMOGRAPHY | Facility: CLINIC | Age: 57
End: 2017-06-12
Attending: NURSE PRACTITIONER
Payer: COMMERCIAL

## 2017-06-12 ENCOUNTER — OFFICE VISIT (OUTPATIENT)
Dept: FAMILY MEDICINE | Facility: CLINIC | Age: 57
End: 2017-06-12
Payer: COMMERCIAL

## 2017-06-12 ENCOUNTER — OFFICE VISIT (OUTPATIENT)
Dept: BEHAVIORAL HEALTH | Facility: CLINIC | Age: 57
End: 2017-06-12

## 2017-06-12 DIAGNOSIS — C73 MALIGNANT NEOPLASM OF THYROID GLAND (H): ICD-10-CM

## 2017-06-12 DIAGNOSIS — G57.72 COMPLEX REGIONAL PAIN SYNDROME TYPE 2 OF LEFT LOWER EXTREMITY: ICD-10-CM

## 2017-06-12 DIAGNOSIS — Z53.9 ERRONEOUS ENCOUNTER--DISREGARD: Primary | ICD-10-CM

## 2017-06-12 DIAGNOSIS — M45.9 ANKYLOSING SPONDYLITIS (H): Primary | ICD-10-CM

## 2017-06-12 DIAGNOSIS — Z12.31 VISIT FOR SCREENING MAMMOGRAM: ICD-10-CM

## 2017-06-12 PROCEDURE — 99214 OFFICE O/P EST MOD 30 MIN: CPT | Performed by: NURSE PRACTITIONER

## 2017-06-12 PROCEDURE — G0202 SCR MAMMO BI INCL CAD: HCPCS

## 2017-06-12 NOTE — PROGRESS NOTES
"SUBJECTIVE:                                                    Alka Mcfadden is a 56 year old female who presents to clinic today for the following health issues:  [facility]: Not present at this meeting    Hives Five days ago, Alka broke out in a rash. It started as three \"little mosquito bites\" on the back of her right leg, and has progressed into general hives. She has been experiencing shortness of breath. Alka and her  believe they have been cutting the long-acting oxycontin in half recently and taking one half in the morning and one half at night.    Chronic Pain:  Failed total hip and ankylosing spondylosis, progressive disease,   Long history of trials of pain medication plan. Prior to her current pain plan with oxycontin and oxycodone she was most recently was on Fentanyl patches.  She independently choose to stop the fentanyl patches because she was tired of being on pain medication and she wanted to see what her pain was like underneath the Narcotic prescriptions.  She went through withdrawal, was in acute debilitating pain and ultimately chose to start oxycodone.  She was seen by Dr Alfredo in pain medicine and was prescribed suboxone.  She never started this medication, she reports she was afraid that suboxone would not control her pain. She is still willing to consider suboxone.  Dr. Alfredo saw pt 4/4/17 and recommended suboxone for pt. Pt was not ready to start the suboxone at the time because she does not want to go off her other pain meds for 24 hours and go through withdrawal again.      She was scheduled to have a thyroid biopsy at [facility] on 5/17/2017.  However, she did not go to the Appointment because she was not feeling well that day. She has a rescheduled appointment in early July.     She says that the costs of medical marijuana are too much for her. She had hopes that this would be helpful with her weight gain.      Pt reports getting rescheduled for her thyroid biopsy for  5/17/17. "   Patient is followed by BOBBY Butcher CNP for ongoing prescription of pain medication.  All refills should only be approved by this provider, or covering partner.    Medication(s): oxycontin and oxycodone.   Maximum quantity per month: Oxycodone #60 (1 BID), and Oxycontin #60 (1 every 12 hours)  Clinic visit frequency required: Q 1 month     Controlled substance agreement:  Encounter-Level CSA - 04/28/2015:                 Controlled Substance Agreement - Scan on 5/1/2015 12:55 PM : Controlled Substance Agreement 04/28/15 (below)          Encounter-Level CSA - 04/28/2015:                 Controlled Substance Agreement - Scan on 1/23/2015  9:22 AM : Controlled Medication Agreement 01/05/15 (below)          Needs a new Controlled substance agreement.   Pain Clinic evaluation in the past: Yes       Date/Location:   04/04/2017    DIRE Total Score(s):    6/19/2017   Total Score 18       Last [facility] website verification:  done on 6/12/2017   [email or web address]        Mental Health Follow up: Depression    Status since last visit: Increased Depressive symptoms    See PHQ-9 for current symptoms.  Other associated symptoms: None    Complicating factors: Chronic pain  Significant life event: Profound weight loss, possible Cancer diagnosis   Current substance abuse:  None  Anxiety or Panic symptoms:  No    Sleep - 3 hours on average  Appetite - Low, but Alka has been trying to eat high-calorie foods  Exercise - None    Smoking - Some tobacco  Alcohol - None  Street drugs - None  Marijuana - Cost of medical marijuana was too high  Caffeine - Tea    PHQ-9  English PHQ-9   Any Language             Amount of exercise or physical activity: None    Problems taking medications regularly: No    Medication side effects: generalized rash    Diet: regular (no restrictions)    Social History   Substance Use Topics     Smoking status: Light Tobacco Smoker     Types: Cigarettes     Smokeless tobacco: Never Used       Comment: 3-4 cigarettes per week.     Alcohol use No      Problem list and histories reviewed & adjusted, as indicated.  Additional history: as documented    Patient Active Problem List   Diagnosis     Ankylosing spondylitis (H)     Encounter for long-term use of opiate analgesic     Hyperalgesia      ### Cataract, OU     Dry eyes     Hyperlipidemia LDL goal <130     Constipation     Insomnia     Tobacco use disorder     Open wound of knee, leg, and ankle     Anxiety     Chronic pain syndrome     Abdominal pain, right upper quadrant     Suicidal ideation     Nausea     Unexplained weight loss     Vaginal bleeding     Pancreatic atrophy     Haney's esophagus determined by biopsy     Chronic obstructive pulmonary disease (H) dx at [place]     Malignant neoplasm of thyroid gland (H)     Complex regional pain syndrome type 2 of left lower extremity     Past Surgical History:   Procedure Laterality Date     ARTHROPLASTY REVISION HIP  2005, 2015     BACK SURGERY  1985/1990/1996     ENDOSCOPIC ULTRASOUND UPPER GASTROINTESTINAL TRACT (GI) N/A 1/5/2017    Procedure: ENDOSCOPIC ULTRASOUND, ESOPHAGOSCOPY / UPPER GASTROINTESTINAL TRACT (GI);  Surgeon: Esteban Mortensen MD;  Location: [facility] OR     ESOPHAGOSCOPY, GASTROSCOPY, DUODENOSCOPY (EGD), COMBINED N/A 1/5/2017    Procedure: COMBINED ESOPHAGOSCOPY, GASTROSCOPY, DUODENOSCOPY (EGD);  Surgeon: Esteban Mortensen MD;  Location: [facility] OR     JOINT REPLACEMENT  1985       Social History   Substance Use Topics     Smoking status: Light Tobacco Smoker     Types: Cigarettes     Smokeless tobacco: Never Used      Comment: 3-4 cigarettes per week.     Alcohol use No     History reviewed. No pertinent family history.      Past medication trials: (patient was presented with a list to review all currently available antidepressants, mood stabilizers, tranquilizers, hypnotics and antipsychotics)             Old Antidepressants: Desipramine (norpramin)   New Antidepressants:  Cymbalta  (duloxetine)  Sedatives/Hypnotics:  Restoril (temazepam)  Tranquilizers:  Valium (diazepam)     Current Outpatient Prescriptions   Medication Sig Dispense Refill     temazepam (RESTORIL) 30 MG capsule TAKE 1 CAPSULE BY MOUTH EVERY NIGHT AT BEDTIME AS NEEDED FOR SLEEP 30 capsule 0     oxyCODONE (OXYCONTIN) 20 MG 12 hr tablet Take 1 tablet (20 mg) by mouth every 12 hours maximum 2 tablet(s) per day 60 tablet 0     oxyCODONE (ROXICODONE) 10 MG IR tablet Take 1 tablet (10 mg) by mouth every 6 hours as needed for moderate to severe pain 90 tablet 0     SUBOXONE 4-1 MG per film PLACE 1 FILM UNDER THE TONGUE BID.  0     ondansetron (ZOFRAN) 4 MG tablet Take 1 tablet (4 mg) by mouth every 8 hours as needed for nausea 40 tablet 0     senna-docusate (SENOKOT-S;PERICOLACE) 8.6-50 MG per tablet Take 2 tablets by mouth 2 times daily 120 tablet 1     albuterol (PROAIR HFA/PROVENTIL HFA/VENTOLIN HFA) 108 (90 BASE) MCG/ACT Inhaler Inhale 2 puffs into the lungs every 4 hours as needed for shortness of breath / dyspnea or wheezing 1 Inhaler 3     ADVAIR DISKUS 250-50 MCG/DOSE diskus inhaler INHALE 1 PUFF PO Q 12 HOURS .TK ON A SCHEDULED BASIS FOR COPD  0     diazepam (VALIUM) 10 MG tablet Take 0.5-1 tablets (5-10 mg) by mouth every 12 hours as needed for anxiety or sleep 60 tablet 0     omeprazole (PRILOSEC) 40 MG capsule Take 1 capsule (40 mg) by mouth daily Take 30-60 minutes before a meal. 30 capsule 1     Allergies   Allergen Reactions     Amoxicillin-Pot Clavulanate Rash     Augmentin Rash     Amitriptyline      Had reaction while on this and vistaril; was just not herself.     Hydroxyzine Other (See Comments)     Had reaction when took this along with amitriptyline.     Vistaril      Had reaction when took this along with amitriptyline.     Recent Labs   Lab Test  02/02/17   1657  01/05/17   0904   12/01/16   1349  12/01/16   0937  10/09/14   LDL   --    --    --    --    --    --   126   HDL   --    --    --    --    --    --    39   TRIG   --    --    --    --    --    --   137   ALT  30  16   --    --   15   < >   --    CR  0.75  0.64   < >   --   0.68   < >   --    GFRESTIMATED  79  >90  Non  GFR Calc     < >   --   89   < >   --    GFRESTBLACK  >90   GFR Calc    >90   GFR Calc     < >   --   >90   GFR Calc     < >   --    POTASSIUM  4.4  4.2   < >   --   3.7   < >   --    TSH  0.70   --    --   0.34*   --    < >   --     < > = values in this interval not displayed.      BP Readings from Last 3 Encounters:   06/13/17 98/76   05/09/17 94/66   04/04/17 124/75    Wt Readings from Last 3 Encounters:   06/13/17 98 lb (44.5 kg)   05/09/17 102 lb 8 oz (46.5 kg)   03/30/17 102 lb 8 oz (46.5 kg)        Labs reviewed in EPIC  Problem list, Medication list, Allergies, and Medical/Social/Surgical histories reviewed in [facility] and updated as appropriate.     ROS: 10 point ROS neg other than the symptoms noted above in the HPI.    This document serves as a record of the services and decisions personally performed and made by BOBBY Faustin NP. It was created on her behalf by Martha Fulton, a trained medical scribe. The creation of this document is based on the provider's statements to the medical scribe.  Martha Fulton June 12, 2017 3:16 PM       OBJECTIVE:                                                    BP 98/76  Pulse 84  Temp 97.9  F (36.6  C) (Oral)  Resp 14  Wt 98 lb (44.5 kg)  SpO2 97%  BMI 16.31 kg/m2  Body mass index is 16.31 kg/(m^2).  GENERAL:Very thin,  alert, well nourished, well hydrated, fatigued  EYES: Eyes grossly normal to inspection, PERRL and conjunctivae and sclerae normal  NECK: no adenopathy, no asymmetry, masses, or scars and thyroid normal to palpation  RESP: lungs Faint wheezes through out otherwise clear to auscultation - no rales, rhonchi  CV: regular rate and rhythm, normal S1 S2, no S3 or S4, no murmur, click or rub, no peripheral edema and  peripheral pulses strong  ABDOMEN: thin, pain and pressure with palpation of upper and lower abdomen, especially right/left upper quad.    MS: Hardware from previous spinal surgery visible in outline through the skin on back, no edema    MENTAL HEALTH  Appearance:  awake, alert and adequately groomed  Attitude:  cooperative  Eye Contact:  good  Mood:  good  Affect: normal, to anxious  Speech:  clear, coherent  Psychomotor Behavior:  no evidence of tardive dyskinesia, dystonia, or tics  Thought Process:  logical and linear  Associations:  no loose associations  Thought Content:  no evidence of suicidal ideation or homicidal ideation and no evidence of psychotic thought  Insight:  good  Judgment:  limited  Oriented to:  time, person, and place  Attention Span and Concentration:  limited by pain  Recent and Remote Memory:  intact  Fund of Knowledge: appropriate  Muscle Strength and Tone: normal   See [facility] notes    Diagnostic Test Results:  none      ASSESSMENT/PLAN:                                                    (M45.9) Ankylosing spondylitis (H)  (primary encounter diagnosis)  Comment: Persistent pain. Remains on chronic opioids, declined to start Suboxone until after clinic visit to [facility] for thyroid biopsy  Plan: Continue on combination oxycodone and oxycontin Discussed timing of medication.  Encouraged to take Both short and long acting medication at the same time in the morning, then short acting oxycodone as needed during the day.     (G57.72) Complex regional pain syndrome type 2 of left lower extremity  Comment: Persistent pain, partically controlled with oxycontin/oxycodone  Plan: Declining to start Suboxone at this time.  She is willing to consider this in the future.     (C73) Malignant neoplasm of thyroid gland (H)  Comment: New diagnosis of Thyroid Cancer per [facility] Providers.   Plan: Encouraged to follow up with [facility] providers as directed. Reinforced that Thyroid Cancer is treatable and exploring the  options for treatment with the [place] providers is highly recommended      Patient Instructions:  Patient Instructions   Take Oxycontin  Every 12 hour (9 am and 9 pm for example)    Take the first dose of Oxycodone 10 mg at the same time as your morning dose of oxycontin.  After that, take Oxycodone only with break through pain.  OK to cut the OxyCODONE in 1/2.  NOT ok to cut the OXYCONTIN in half.      Please follow up with the [place] providers as they direct you.  Thyroid Cancer is typically treatable.       The information in this document, created by the medical scribe for me, accurately reflects the services I personally performed and the decisions made by me. I have reviewed and approved this document for accuracy prior to leaving the patient care area.  6/12/2017 3:16 PM   25 min spent in direct face to face time with this pt, greater than 50% in counseling and coordination of care.  BOBBY Butcher [facility] PRIMARY [facility]

## 2017-06-12 NOTE — MR AVS SNAPSHOT
After Visit Summary   6/12/2017    Alka Mcfadden    MRN: [id number]           Patient Information     Date Of Birth          1960        Visit Information        Provider Department      6/12/2017 3:00 PM Shanda Vo APRN CNP [facility]        Today's Diagnoses     Ankylosing spondylitis (H)    -  1    Complex regional pain syndrome type 2 of left lower extremity        Malignant neoplasm of thyroid gland (H)          Care Instructions    Take Oxycontin  Every 12 hour (9 am and 9 pm for example)    Take the first dose of Oxycodone 10 mg at the same time as your morning dose of oxycontin.  After that, take Oxycodone only with break through pain.  OK to cut the OxyCODONE in 1/2.  NOT ok to cut the OXYCONTIN in half.      Please follow up with the [place] providers as they direct you.  Thyroid Cancer is typically treatable.           Follow-ups after your visit        Follow-up notes from your care team     Return in about 4 weeks (around 7/10/2017) for Mental Health assessment, COPD/Asthma, Chronic pain, medication management.      Your next 10 appointments already scheduled     Jul 05, 2017  3:30 PM CDT   Return Visit with BOBBY Romero CNP   [facility] ([facility]    [address] [place] [address]  [address]  [facility] [id number] [phone]              Who to contact     If you have questions or need follow up information about today's clinic visit or your schedule please contact [facility] PRIMARY [facility] directly at [phone].  Normal or non-critical lab and imaging results will be communicated to you by MyChart, letter or phone within 4 business days after the clinic has received the results. If you do not hear from us within 7 days, please contact the clinic through MyChart or phone. If you have a critical or abnormal lab result, we will notify you by phone as soon as  "possible.  Submit refill requests through [employer] or call your pharmacy and they will forward the refill request to us. Please allow 3 business days for your refill to be completed.          Additional Information About Your Visit        ArtwardlyharMedxnote Information     [employer] lets you send messages to your doctor, view your test results, renew your prescriptions, schedule appointments and more. To sign up, go to [email or web address] . Click on \"Log in\" on the left side of the screen, which will take you to the Welcome page. Then click on \"Sign up Now\" on the right side of the page.     You will be asked to enter the access code listed below, as well as some personal information. Please follow the directions to create your username and password.     Your access code is: [email or web address]  Expires: 2017  4:56 PM     Your access code will  in 90 days. If you need help or a new code, please call your [place] clinic or [phone].        Care EveryWhere ID     This is your Care EveryWhere ID. This could be used by other organizations to access your [place] medical records  [id number]        Your Vitals Were     Pulse Temperature Respirations Pulse Oximetry BMI (Body Mass Index)       84 97.9  F (36.6  C) (Oral) 14 97% 16.31 kg/m2        Blood Pressure from Last 3 Encounters:   17 98/76   17 94/66   17 124/75    Weight from Last 3 Encounters:   17 98 lb (44.5 kg)   17 102 lb 8 oz (46.5 kg)   17 102 lb 8 oz (46.5 kg)              Today, you had the following     No orders found for display       Primary Care Provider Office Phone # Fax #    BOBBY Balbuena [phone]       [facility] [address] [facility] [address]  [facility] [address]        Equal Access to Services     PAULIE MARQUEZ : Elías Pretty, waedward luqaline, qaybaarti sueroallópez lopes, felipe snell. [facility] [phone].    ATENCIÓN: Si " nguyen cortez, tiene a [place] disposición servicios gratuitos de asistencia lingüística. Mark blanco [phone].    We comply with applicable federal civil rights laws and [place] laws. We do not discriminate on the basis of race, color, national origin, age, disability sex, sexual orientation or gender identity.            Thank you!     Thank you for choosing [facility]IMARY [facility]  for your care. Our goal is always to provide you with excellent care. Hearing back from our patients is one way we can continue to improve our services. Please take a few minutes to complete the written survey that you may receive in the mail after your visit with us. Thank you!             Your Updated Medication List - Protect others around you: Learn how to safely use, store and throw away your medicines at www.disposemymeds.org.          This list is accurate as of: 6/12/17 11:59 PM.  Always use your most recent med list.                   Brand Name Dispense Instructions for use Diagnosis    ADVAIR DISKUS 250-50 MCG/DOSE diskus inhaler   Generic drug:  fluticasone-salmeterol      INHALE 1 PUFF PO Q 12 HOURS .TK ON A SCHEDULED BASIS FOR COPD    Chronic obstructive pulmonary disease with acute lower respiratory infection (H)       albuterol 108 (90 BASE) MCG/ACT Inhaler    PROAIR HFA/PROVENTIL HFA/VENTOLIN HFA    1 Inhaler    Inhale 2 puffs into the lungs every 4 hours as needed for shortness of breath / dyspnea or wheezing    Chronic obstructive pulmonary disease with acute lower respiratory infection (H)       diazepam 10 MG tablet    VALIUM    60 tablet    Take 0.5-1 tablets (5-10 mg) by mouth every 12 hours as needed for anxiety or sleep    Anxiety, Chronic pain syndrome       omeprazole 40 MG capsule    priLOSEC    30 capsule    Take 1 capsule (40 mg) by mouth daily Take 30-60 minutes before a meal.    Haney's esophagus without dysplasia       ondansetron 4 MG tablet    ZOFRAN    40 tablet    Take 1 tablet (4  mg) by mouth every 8 hours as needed for nausea    Nausea       * oxyCODONE 20 MG 12 hr tablet    OxyCONTIN    60 tablet    Take 1 tablet (20 mg) by mouth every 12 hours maximum 2 tablet(s) per day    Chronic pain syndrome, Encounter for long-term use of opiate analgesic       * oxyCODONE 10 MG IR tablet    ROXICODONE    90 tablet    Take 1 tablet (10 mg) by mouth every 6 hours as needed for moderate to severe pain    Chronic pain syndrome, Encounter for long-term use of opiate analgesic       senna-docusate 8.6-50 MG per tablet    SENOKOT-S;PERICOLACE    120 tablet    Take 2 tablets by mouth 2 times daily    Encounter for long-term use of opiate analgesic       SUBOXONE 4-1 MG per film   Generic drug:  buprenorphine HCl-naloxone HCl      PLACE 1 FILM UNDER THE TONGUE BID.        temazepam 30 MG capsule    RESTORIL    30 capsule    TAKE 1 CAPSULE BY MOUTH EVERY NIGHT AT BEDTIME AS NEEDED FOR SLEEP    Ankylosing spondylitis (H), Chronic pain syndrome       * Notice:  This list has 2 medication(s) that are the same as other medications prescribed for you. Read the directions carefully, and ask your doctor or other care provider to review them with you.

## 2017-06-12 NOTE — MR AVS SNAPSHOT
"              After Visit Summary   2017    Alka Mcfadden    MRN: [id number]           Patient Information     Date Of Birth          1960        Visit Information        Provider Department      2017 3:00 PM Vaughn Eldridge, [facility]        Today's Diagnoses     ERRONEOUS ENCOUNTER--DISREGARD    -  1       Follow-ups after your visit        Who to contact     If you have questions or need follow up information about today's clinic visit or your schedule please contact [facility] directly at [phone].  Normal or non-critical lab and imaging results will be communicated to you by Voxxterhart, letter or phone within 4 business days after the clinic has received the results. If you do not hear from us within 7 days, please contact the clinic through Voxxterhart or phone. If you have a critical or abnormal lab result, we will notify you by phone as soon as possible.  Submit refill requests through ClassWallet or call your pharmacy and they will forward the refill request to us. Please allow 3 business days for your refill to be completed.          Additional Information About Your Visit        MyChart Information     ClassWallet lets you send messages to your doctor, view your test results, renew your prescriptions, schedule appointments and more. To sign up, go to [email or web address] . Click on \"Log in\" on the left side of the screen, which will take you to the Welcome page. Then click on \"Sign up Now\" on the right side of the page.     You will be asked to enter the access code listed below, as well as some personal information. Please follow the directions to create your username and password.     Your access code is: [email or web address]  Expires: 2017  4:56 PM     Your access code will  in 90 days. If you need help or a new code, please call your [facility] or [phone].        Care EveryWhere ID     This is your Care " EveryWhere ID. This could be used by other organizations to access your [facility] medical records  [id number]         Blood Pressure from Last 3 Encounters:   06/13/17 98/76   05/09/17 94/66   04/04/17 124/75    Weight from Last 3 Encounters:   06/13/17 98 lb (44.5 kg)   05/09/17 102 lb 8 oz (46.5 kg)   03/30/17 102 lb 8 oz (46.5 kg)              Today, you had the following     No orders found for display       Primary Care Provider Office Phone # Fax #    BOBBY Balbuena [phone]       [facility] [address] [place] [address] [address] [place] [id number]        Thank you!     Thank you for choosing [facility]  for your care. Our goal is always to provide you with excellent care. Hearing back from our patients is one way we can continue to improve our services. Please take a few minutes to complete the written survey that you may receive in the mail after your visit with us. Thank you!             Your Updated Medication List - Protect others around you: Learn how to safely use, store and throw away your medicines at www.disposemymeds.org.          This list is accurate as of: 6/12/17 11:59 PM.  Always use your most recent med list.                   Brand Name Dispense Instructions for use    ADVAIR DISKUS 250-50 MCG/DOSE diskus inhaler   Generic drug:  fluticasone-salmeterol      INHALE 1 PUFF PO Q 12 HOURS .TK ON A SCHEDULED BASIS FOR COPD       albuterol 108 (90 BASE) MCG/ACT Inhaler    PROAIR HFA/PROVENTIL HFA/VENTOLIN HFA    1 Inhaler    Inhale 2 puffs into the lungs every 4 hours as needed for shortness of breath / dyspnea or wheezing       diazepam 10 MG tablet    VALIUM    60 tablet    Take 0.5-1 tablets (5-10 mg) by mouth every 12 hours as needed for anxiety or sleep       omeprazole 40 MG capsule    priLOSEC    30 capsule    Take 1 capsule (40 mg) by mouth daily Take 30-60 minutes before a meal.       ondansetron 4 MG tablet    ZOFRAN     40 tablet    Take 1 tablet (4 mg) by mouth every 8 hours as needed for nausea       * oxyCODONE 20 MG 12 hr tablet    OxyCONTIN    60 tablet    Take 1 tablet (20 mg) by mouth every 12 hours maximum 2 tablet(s) per day       * oxyCODONE 10 MG IR tablet    ROXICODONE    90 tablet    Take 1 tablet (10 mg) by mouth every 6 hours as needed for moderate to severe pain       senna-docusate 8.6-50 MG per tablet    SENOKOT-S;PERICOLACE    120 tablet    Take 2 tablets by mouth 2 times daily       SUBOXONE 4-1 MG per film   Generic drug:  buprenorphine HCl-naloxone HCl      PLACE 1 FILM UNDER THE TONGUE BID.       temazepam 30 MG capsule    RESTORIL    30 capsule    TAKE 1 CAPSULE BY MOUTH EVERY NIGHT AT BEDTIME AS NEEDED FOR SLEEP       * Notice:  This list has 2 medication(s) that are the same as other medications prescribed for you. Read the directions carefully, and ask your doctor or other care provider to review them with you.

## 2017-06-13 VITALS
HEART RATE: 84 BPM | SYSTOLIC BLOOD PRESSURE: 98 MMHG | WEIGHT: 98 LBS | DIASTOLIC BLOOD PRESSURE: 76 MMHG | OXYGEN SATURATION: 97 % | RESPIRATION RATE: 14 BRPM | TEMPERATURE: 97.9 F | BODY MASS INDEX: 16.31 KG/M2

## 2017-06-13 NOTE — NURSING NOTE
"Chief Complaint   Patient presents with     RECHECK       Initial BP 98/76  Pulse 84  Temp 97.9  F (36.6  C) (Oral)  Resp 14  Wt 98 lb (44.5 kg)  SpO2 97%  BMI 16.31 kg/m2 Estimated body mass index is 16.31 kg/(m^2) as calculated from the following:    Height as of 1/5/17: 5' 5\" (1.651 m).    Weight as of this encounter: 98 lb (44.5 kg).  Medication Reconciliation: complete     Oswaldo Santiago, ZULEIMA    "

## 2017-06-15 NOTE — TELEPHONE ENCOUNTER
6/15/2017      Patient has scheduled and completed Mammo on 6/12/2017.        Outreach ,  Gita Zarate

## 2017-06-19 ENCOUNTER — TELEPHONE (OUTPATIENT)
Dept: FAMILY MEDICINE | Facility: CLINIC | Age: 57
End: 2017-06-19

## 2017-06-19 NOTE — TELEPHONE ENCOUNTER
FYI:  Patient called stef Land that she was diagnosis with papillary thyroid cancer and pt will be making an appt with the [facility] to have it removed.          Pawel De La Rosa

## 2017-06-20 PROBLEM — C73 MALIGNANT NEOPLASM OF THYROID GLAND (H): Status: ACTIVE | Noted: 2017-06-20

## 2017-06-20 NOTE — TELEPHONE ENCOUNTER
I am grateful that she called us with this information.  In the context of her weight loss, this makes a lot of sense.   I am glad she is going to have it taken care of.    Please call her and ask her when she is having this done and if she needs any bridge prescriptions.  Thanks  Shanda Vo, CNP, APNP  [facility]

## 2017-06-20 NOTE — TELEPHONE ENCOUNTER
Pt will need have to have a pre-op.  Pt meets with the Dr. and surgeon on the 6th of July.  After she meets with them she will have a surgery date.    Pt stated that she needs to be set up with a provider to get a provider marijuana card.      Pt is asking if Marlin can talk to Funmi Alfredo to get information on where to go to get this completed.      Pt stated that all her medications are in her safe and will call back if she needs any bridge instructions.     Nickolas Dempsey RN

## 2017-06-21 NOTE — TELEPHONE ENCOUNTER
Huddled with providers Shanda and Gwen and scheduled patient with Gwen Porter to discuss further medical marijuana.  Bhumi Sanchez RN

## 2017-06-23 PROBLEM — G57.72 COMPLEX REGIONAL PAIN SYNDROME TYPE 2 OF LEFT LOWER EXTREMITY: Status: ACTIVE | Noted: 2017-06-23

## 2017-06-23 NOTE — PATIENT INSTRUCTIONS
Take Oxycontin  Every 12 hour (9 am and 9 pm for example)    Take the first dose of Oxycodone 10 mg at the same time as your morning dose of oxycontin.  After that, take Oxycodone only with break through pain.  OK to cut the OxyCODONE in 1/2.  NOT ok to cut the OXYCONTIN in half.      Please follow up with the [place] providers as they direct you.  Thyroid Cancer is typically treatable.

## 2017-06-27 ENCOUNTER — TELEPHONE (OUTPATIENT)
Dept: FAMILY MEDICINE | Facility: CLINIC | Age: 57
End: 2017-06-27

## 2017-06-27 DIAGNOSIS — G89.4 CHRONIC PAIN SYNDROME: ICD-10-CM

## 2017-06-27 DIAGNOSIS — G57.72 COMPLEX REGIONAL PAIN SYNDROME TYPE 2 OF LEFT LOWER EXTREMITY: Primary | ICD-10-CM

## 2017-06-27 DIAGNOSIS — Z79.891 ENCOUNTER FOR LONG-TERM USE OF OPIATE ANALGESIC: ICD-10-CM

## 2017-06-27 RX ORDER — OXYCODONE HYDROCHLORIDE 10 MG/1
10 TABLET ORAL EVERY 6 HOURS PRN
Qty: 90 TABLET | Refills: 0 | Status: CANCELLED | OUTPATIENT
Start: 2017-06-27

## 2017-06-27 NOTE — TELEPHONE ENCOUNTER
See refill request already sent to PCP.    Will close this refill request.    Nickolas Dempsey RN

## 2017-06-27 NOTE — TELEPHONE ENCOUNTER
Reason for Call:  prescription    Detailed comments: Pt states she stopped taking oxyCODONE (OXYCONTIN) 20 MG, because she does not like the way it makes her feel. Pt would like her oxyCODONE (ROXICODONE) 10 MG dose increased. Pt states she has been taking it every 4-6 hours.     Phone Number Patient can be reached at: Home number on file [phone] (home)    Best Time: anytime    Can we leave a detailed message on this number? YES    Call taken on 6/27/2017 at 11:02 AM by Deloris Leon

## 2017-06-27 NOTE — TELEPHONE ENCOUNTER
oxyCODONE (ROXICODONE) 10 MG  Controlled Substance Refill Request    Last refill: 6/1/17    Last clinic visit: 6/12/17    Next appt: 7/5/17    Controlled substance agreement on file: Yes:  Date 4/28/17.    Documentation in problem list reviewed:  Yes    Processing:  Patient will  in clinic    RX monitoring program ([place]) reviewed:  reviewed- no concerns  [place] profile:  [email or web address]      Please see Note from pt.  Pt would like a dose increase of IR and would like to D/C the extended release.    Nickolas Dempsey RN

## 2017-06-27 NOTE — TELEPHONE ENCOUNTER
Incoming call from pt requesting a refill. Pt's , Rubio Mcfadden, will  script.     oxyCODONE (ROXICODONE) 10 MG      Last Written Prescription Date: 06/01/17  Last Fill Quantity: 90,  # refills: 0  Last Office Visit with [facility], [facility] or [facility] prescribing provider: 06/12/17                                         Next 5 appointments (look out 90 days)     Jul 05, 2017  3:30 PM CDT   Return Visit with BOBBY Romero CNP   [facility] ([facility])    [address] [place] [address]  [address]  [facility] [address] [phone]

## 2017-06-28 RX ORDER — OXYCODONE HYDROCHLORIDE 10 MG/1
10 TABLET ORAL EVERY 6 HOURS PRN
Qty: 40 TABLET | Refills: 0 | Status: SHIPPED | OUTPATIENT
Start: 2017-06-28 | End: 2017-07-13

## 2017-06-28 NOTE — TELEPHONE ENCOUNTER
I really want to discuss this with her before committing to a plan.  I would really like to understand more about why she does not like this pain plan.  Recommended management with opiates for chronic pain typically does not rely on short acting opioids for the foundation of the plan.    I wrote a prescription to take her a bit beyond the date of our next appointment.      We will talk about this at the next visit 7/5/2017  Shanda Vo CNP, APNP  [facility] Primary [facility]

## 2017-06-29 NOTE — TELEPHONE ENCOUNTER
Pt's next Appt with be with Gwen Porter.      Pt will be back from the [place] after July 7th and will follow up with Marlin at that time.      Nickolas Dempsey RN

## 2017-07-13 ENCOUNTER — TELEPHONE (OUTPATIENT)
Dept: FAMILY MEDICINE | Facility: CLINIC | Age: 57
End: 2017-07-13

## 2017-07-13 DIAGNOSIS — G89.4 CHRONIC PAIN SYNDROME: ICD-10-CM

## 2017-07-13 DIAGNOSIS — Z79.891 ENCOUNTER FOR LONG-TERM USE OF OPIATE ANALGESIC: ICD-10-CM

## 2017-07-13 RX ORDER — OXYCODONE HCL 20 MG/1
20 TABLET, FILM COATED, EXTENDED RELEASE ORAL EVERY 12 HOURS
Qty: 60 TABLET | Refills: 0 | Status: SHIPPED | OUTPATIENT
Start: 2017-07-13 | End: 2017-09-18

## 2017-07-13 RX ORDER — OXYCODONE HYDROCHLORIDE 10 MG/1
10 TABLET ORAL EVERY 6 HOURS PRN
Qty: 60 TABLET | Refills: 0 | Status: SHIPPED | OUTPATIENT
Start: 2017-07-13 | End: 2017-09-18

## 2017-07-13 NOTE — TELEPHONE ENCOUNTER
Pt said she will run out of meds next week, and she would like a call back when prescription is ready for .  Pt contact info:  [phone]      oxyCODONE (OXYCONTIN) 20 MG 12 hr tablet      Last Written Prescription Date: 6/1/17  Last Fill Quantity: 60,  # refills: 0   Last Office Visit with [facility], [facility] or [facility] prescribing provider: 6/12/17                                         Next 5 appointments (look out 90 days)     Aug 07, 2017  1:00 PM CDT   [facility]   [facility] ([facility])    [address]  [address]  [facility] [address]   [phone]            Aug 07, 2017  1:00 PM CDT   Return Visit with BOBBY Balbuena CNP   [facility] ([facility])    [address]  [address]  [facility] [address]   [phone]

## 2017-07-13 NOTE — TELEPHONE ENCOUNTER
Pt called and notified that Rx's are available at .  Look forward to seeing her in early August.    Nickolas Dempsey RN

## 2017-07-13 NOTE — TELEPHONE ENCOUNTER
Please call Alka and let her know that the prescription is ready and we are looking forward to seeing her at the Clinic in early August.   Shanda Vo CNP, APNP  [facility] Primary Care [facility]

## 2017-07-13 NOTE — TELEPHONE ENCOUNTER
Controlled Substance Refill Request for Oxycontin    Last refill: 6/12/17, 60 tablets     Last clinic visit: 6/12/17    Next appt: 8/7/17    Controlled substance agreement on file: Yes:  Date 4/28/15.    Documentation in problem list reviewed:  Yes    Processing:  Patient will  in clinic    RX monitoring program ([place]MP) reviewed:  reviewed- no concerns  [place]MP profile:  [email or web address]      Thank you!  Bhumi Sanchez RN

## 2017-07-20 DIAGNOSIS — G47.00 INSOMNIA, UNSPECIFIED TYPE: Primary | ICD-10-CM

## 2017-07-20 RX ORDER — TEMAZEPAM 30 MG
CAPSULE ORAL
Qty: 30 CAPSULE | Refills: 0 | Status: SHIPPED | OUTPATIENT
Start: 2017-07-20 | End: 2017-08-18

## 2017-07-20 NOTE — TELEPHONE ENCOUNTER
Routing refill request to provider for review/approval because:  Drug not on the [facility] refill protocol           temazepam (RESTORIL) 30 MG capsule  Last Written Prescription Date: 6/6/17  Last Fill Quantity: 30,  # refills: 0   Last Office Visit with [facility], [facility] or [facility] prescribing provider: 6/12/17                                        Next 5 appointments (look out 90 days)     Aug 07, 2017  1:00 PM CDT   Return Visit with CHERY Bahena   [facility] ([facility])    [address]  [address]  [facility] [address]   [phone]            Aug 07, 2017  1:00 PM CDT   Return Visit with BOBBY Balbuena CNP   [facility] ([facility])    [address]  [address]  [facility] [address]   [phone]                    Nickolas Dempsey RN

## 2017-08-18 DIAGNOSIS — G47.00 INSOMNIA, UNSPECIFIED TYPE: ICD-10-CM

## 2017-08-18 NOTE — TELEPHONE ENCOUNTER
Routing to provider pool while Shanda is out -- please review and sign/advise. Thank you    Controlled Substance Refill Request for Restoril     Last refill: 7/20/17, 30 tabs    Last clinic visit: 6/12/17     Next appt: not scheduled    Controlled substance agreement on file: Yes:  Date 4/28/15.    Documentation in problem list reviewed:  Yes    Processing:  Unknown; please contact patient    RX monitoring program ([place]NPMP) reviewed: Not reviewed  [place]MP profile:  [email or web address]    QUAN Matson, RN  [facility]

## 2017-08-21 RX ORDER — TEMAZEPAM 30 MG
CAPSULE ORAL
Qty: 30 CAPSULE | Refills: 0 | Status: SHIPPED | OUTPATIENT
Start: 2017-08-21 | End: 2017-09-18

## 2017-08-30 DIAGNOSIS — Z79.891 ENCOUNTER FOR LONG-TERM USE OF OPIATE ANALGESIC: ICD-10-CM

## 2017-08-30 RX ORDER — DOCUSATE SODIUM 50MG AND SENNOSIDES 8.6MG 8.6; 5 MG/1; MG/1
TABLET, FILM COATED ORAL
Qty: 120 TABLET | Refills: 0 | Status: SHIPPED | OUTPATIENT
Start: 2017-08-30 | End: 2017-09-18

## 2017-08-30 NOTE — TELEPHONE ENCOUNTER
Tiffany VALLADARES    Last Written Prescription Date: 7/10/17  Last Fill Quantity: 120,  # refills: 0  Last Office Visit with G, P or [facility] prescribing provider: 6/12/17                                         Next 5 appointments (look out 90 days)     Sep 18, 2017  3:30 PM CDT   Return Visit with BOBBY Balbuena CNP   [facility] ([facility])    [address] [place] [address]  [address]  [facility] [address]   [phone]

## 2017-09-18 ENCOUNTER — OFFICE VISIT (OUTPATIENT)
Dept: FAMILY MEDICINE | Facility: CLINIC | Age: 57
End: 2017-09-18
Payer: COMMERCIAL

## 2017-09-18 ENCOUNTER — TELEPHONE (OUTPATIENT)
Dept: FAMILY MEDICINE | Facility: CLINIC | Age: 57
End: 2017-09-18

## 2017-09-18 VITALS
OXYGEN SATURATION: 91 % | BODY MASS INDEX: 17.72 KG/M2 | TEMPERATURE: 98.7 F | RESPIRATION RATE: 16 BRPM | WEIGHT: 106.5 LBS | SYSTOLIC BLOOD PRESSURE: 128 MMHG | HEART RATE: 88 BPM | DIASTOLIC BLOOD PRESSURE: 72 MMHG

## 2017-09-18 DIAGNOSIS — G47.00 INSOMNIA, UNSPECIFIED TYPE: ICD-10-CM

## 2017-09-18 DIAGNOSIS — Z79.891 ENCOUNTER FOR LONG-TERM USE OF OPIATE ANALGESIC: Primary | ICD-10-CM

## 2017-09-18 DIAGNOSIS — Z79.891 ENCOUNTER FOR LONG-TERM USE OF OPIATE ANALGESIC: ICD-10-CM

## 2017-09-18 DIAGNOSIS — C73 MALIGNANT NEOPLASM OF THYROID GLAND (H): ICD-10-CM

## 2017-09-18 DIAGNOSIS — F33.41 RECURRENT MAJOR DEPRESSIVE DISORDER, IN PARTIAL REMISSION (H): ICD-10-CM

## 2017-09-18 DIAGNOSIS — G89.4 CHRONIC PAIN SYNDROME: ICD-10-CM

## 2017-09-18 DIAGNOSIS — M45.0 ANKYLOSING SPONDYLITIS OF MULTIPLE SITES IN SPINE (H): Primary | ICD-10-CM

## 2017-09-18 PROCEDURE — 99215 OFFICE O/P EST HI 40 MIN: CPT | Performed by: NURSE PRACTITIONER

## 2017-09-18 RX ORDER — MIRTAZAPINE 15 MG/1
15 TABLET, FILM COATED ORAL AT BEDTIME
Qty: 30 TABLET | Refills: 1 | Status: SHIPPED | OUTPATIENT
Start: 2017-09-18 | End: 2017-11-13

## 2017-09-18 RX ORDER — HYDROCODONE BITARTRATE AND ACETAMINOPHEN 7.5; 325 MG/15ML; MG/15ML
15 SOLUTION ORAL 3 TIMES DAILY PRN
Qty: 1800 ML | Refills: 0 | Status: SHIPPED | OUTPATIENT
Start: 2017-09-18 | End: 2017-09-29

## 2017-09-18 RX ORDER — AMOXICILLIN 250 MG
2 CAPSULE ORAL 2 TIMES DAILY PRN
Qty: 120 TABLET | Refills: 0 | Status: SHIPPED | OUTPATIENT
Start: 2017-09-18 | End: 2018-01-04

## 2017-09-18 RX ORDER — TEMAZEPAM 30 MG
30 CAPSULE ORAL
Qty: 30 CAPSULE | Refills: 0 | Status: SHIPPED | OUTPATIENT
Start: 2017-09-18 | End: 2017-10-16

## 2017-09-18 NOTE — PATIENT INSTRUCTIONS
Please be extremely smart about how you are taking your Narcotic medications.       Please be open minded toward a possible change in your medication for depression and sleep.     We may need to go up to 30 mg on the Mirtazapine and down to 15 mg on the Temazepam.

## 2017-09-18 NOTE — TELEPHONE ENCOUNTER
Saeid chow called they directions on Narcan nasal spray need to be clarify.   Phar contact info:  [phone]        Pawel De La Rosa
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
## 2017-09-18 NOTE — MR AVS SNAPSHOT
"              After Visit Summary   9/18/2017    Alka Mcfadden    MRN: [id number]           Patient Information     Date Of Birth          1960        Visit Information        Provider Department      9/18/2017 3:30 PM Shanda Vo APRN CNP [facility]        Today's Diagnoses     Ankylosing spondylitis of multiple sites in spine (H)    -  1    Insomnia, unspecified type        Encounter for long-term use of opiate analgesic        Chronic pain syndrome        Malignant neoplasm of thyroid gland (H)          Care Instructions    Please be extremely smart about how you are taking your Narcotic medications.       Please be open minded toward a possible change in your medication for depression and sleep.     We may need to go up to 30 mg on the Mirtazapine and down to 15 mg on the Temazepam.            Follow-ups after your visit        Who to contact     If you have questions or need follow up information about today's clinic visit or your schedule please contact [facility] directly at [phone].  Normal or non-critical lab and imaging results will be communicated to you by Edit[employer] Medicinehart, letter or phone within 4 business days after the clinic has received the results. If you do not hear from us within 7 days, please contact the clinic through Tueborat or phone. If you have a critical or abnormal lab result, we will notify you by phone as soon as possible.  Submit refill requests through [employer] or call your pharmacy and they will forward the refill request to us. Please allow 3 business days for your refill to be completed.          Additional Information About Your Visit        Editas Medicinehart Information     [employer] lets you send messages to your doctor, view your test results, renew your prescriptions, schedule appointments and more. To sign up, go to [email or web address] . Click on \"Log in\" on the left side of the screen, which will take you to the " "Welcome page. Then click on \"Sign up Now\" on the right side of the page.     You will be asked to enter the access code listed below, as well as some personal information. Please follow the directions to create your username and password.     Your access code is: [email or web address]  Expires: 2017  4:18 PM     Your access code will  in 90 days. If you need help or a new code, please call your [facility] clinic or [phone].        Care EveryWhere ID     This is your Care EveryWhere ID. This could be used by other organizations to access your [facility] medical records  [id number]        Your Vitals Were     Pulse Temperature Respirations Pulse Oximetry BMI (Body Mass Index)       88 98.7  F (37.1  C) (Oral) 16 91% 17.72 kg/m2        Blood Pressure from Last 3 Encounters:   17 128/72   17 98/76   17 94/66    Weight from Last 3 Encounters:   17 106 lb 8 oz (48.3 kg)   17 98 lb (44.5 kg)   17 102 lb 8 oz (46.5 kg)              We Performed the Following     CBC with platelets     TSH with free T4 reflex          Today's Medication Changes          These changes are accurate as of: 17  4:19 PM.  If you have any questions, ask your nurse or doctor.               Start taking these medicines.        Dose/Directions    HYDROcodone-acetaminophen 7.5-325 MG/15ML solution   Commonly known as:  LORTAB   Used for:  Chronic pain syndrome   Started by:  Shanda Vo APRN CNP        Dose:  15 mL   Take 15 mLs by mouth 3 times daily as needed for moderate to severe pain   Quantity:  1800 mL   Refills:  0       naloxone nasal spray   Commonly known as:  NARCAN   Used for:  Chronic pain syndrome   Started by:  Shanda Vo APRN CNP        Dose:  0.2 spray   Spray 0.2 sprays (0.8 mg) into one nostril alternating nostrils as needed for opioid reversal   Quantity:  1 each   Refills:  1         These medicines have changed or have updated prescriptions.        " Dose/Directions    senna-docusate 8.6-50 MG per tablet   Commonly known as:  SENEXON-S   This may have changed:  See the new instructions.   Used for:  Encounter for long-term use of opiate analgesic   Changed by:  Shanda Vo APRN CNP        Dose:  2 tablet   Take 2 tablets by mouth 2 times daily as needed for constipation   Quantity:  120 tablet   Refills:  0       temazepam 30 MG capsule   Commonly known as:  RESTORIL   This may have changed:  See the new instructions.   Used for:  Insomnia, unspecified type   Changed by:  Shanda Vo APRN CNP        Dose:  30 mg   Take 1 capsule (30 mg) by mouth nightly as needed for sleep   Quantity:  30 capsule   Refills:  0         Stop taking these medicines if you haven't already. Please contact your care team if you have questions.     SUBOXONE 4-1 MG per film   Generic drug:  buprenorphine HCl-naloxone HCl   Stopped by:  Shanda Vo APRN CNP                Where to get your medicines      These medications were sent to [facility] [address] [place] [address], [place] [address] AT [place] [address], [facility] [address]     Phone:  [phone]     naloxone nasal spray    senna-docusate 8.6-50 MG per tablet         Some of these will need a paper prescription and others can be bought over the counter.  Ask your nurse if you have questions.     Bring a paper prescription for each of these medications     HYDROcodone-acetaminophen 7.5-325 MG/15ML solution    temazepam 30 MG capsule                Primary Care Provider Office Phone # Fax #    BOBBY Balbuena [phone]       [address] [facility] [address]  [facility] [address]        Equal Access to Services     [facility]: Hadii jerry chua hadasho Soomaali, waaxda luqadaha, qaybta kaalmada adeegyada, felipe rocha . So [facility] [phone].    ATENCIÓN: Si habla español, tiene a olmstead disposición servicios gratuitos de  luiza lingüística. Mark al [phone].    We comply with applicable federal civil rights laws and [place] laws. We do not discriminate on the basis of race, color, national origin, age, disability sex, sexual orientation or gender identity.            Thank you!     Thank you for choosing [facility] PRIMARY CARE  for your care. Our goal is always to provide you with excellent care. Hearing back from our patients is one way we can continue to improve our services. Please take a few minutes to complete the written survey that you may receive in the mail after your visit with us. Thank you!             Your Updated Medication List - Protect others around you: Learn how to safely use, store and throw away your medicines at www.disposemymeds.org.          This list is accurate as of: 9/18/17  4:19 PM.  Always use your most recent med list.                   Brand Name Dispense Instructions for use Diagnosis    ADVAIR DISKUS 250-50 MCG/DOSE diskus inhaler   Generic drug:  fluticasone-salmeterol      INHALE 1 PUFF PO Q 12 HOURS .TK ON A SCHEDULED BASIS FOR COPD    Chronic obstructive pulmonary disease with acute lower respiratory infection (H)       albuterol 108 (90 BASE) MCG/ACT Inhaler    PROAIR HFA/PROVENTIL HFA/VENTOLIN HFA    1 Inhaler    Inhale 2 puffs into the lungs every 4 hours as needed for shortness of breath / dyspnea or wheezing    Chronic obstructive pulmonary disease with acute lower respiratory infection (H)       diazepam 10 MG tablet    VALIUM    60 tablet    Take 0.5-1 tablets (5-10 mg) by mouth every 12 hours as needed for anxiety or sleep    Anxiety, Chronic pain syndrome       HYDROcodone-acetaminophen 7.5-325 MG/15ML solution    LORTAB    1800 mL    Take 15 mLs by mouth 3 times daily as needed for moderate to severe pain    Chronic pain syndrome       MIRTAZAPINE PO      Take 15 mg by mouth At Bedtime        naloxone nasal spray    NARCAN    1 each    Spray 0.2 sprays (0.8  mg) into one nostril alternating nostrils as needed for opioid reversal    Chronic pain syndrome       omeprazole 40 MG capsule    priLOSEC    30 capsule    Take 1 capsule (40 mg) by mouth daily Take 30-60 minutes before a meal.    Haney's esophagus without dysplasia       ondansetron 4 MG tablet    ZOFRAN    40 tablet    Take 1 tablet (4 mg) by mouth every 8 hours as needed for nausea    Nausea       senna-docusate 8.6-50 MG per tablet    SENEXON-S    120 tablet    Take 2 tablets by mouth 2 times daily as needed for constipation    Encounter for long-term use of opiate analgesic       temazepam 30 MG capsule    RESTORIL    30 capsule    Take 1 capsule (30 mg) by mouth nightly as needed for sleep    Insomnia, unspecified type

## 2017-09-19 ENCOUNTER — TELEPHONE (OUTPATIENT)
Dept: FAMILY MEDICINE | Facility: CLINIC | Age: 57
End: 2017-09-19

## 2017-09-19 NOTE — TELEPHONE ENCOUNTER
Writer called pt's pharmcy.  Pharmacy stated that quantity is incorrect.  Writer gave OK to change to a 30 day supply that would be 1350ml.    Pharmacy stated that insurance will only allow 480ml to be dispensed.  Pharmacy will place as 1350 but only dispense 480ml, Pt may need a new Rx for remainder.    Pharmacist also stated that there is no was to do a partial spray of Narcan.  It despenses in a metered dose on 0.1ml each spray.  Pharmacist would like OK to change directions.    Nickolas Dempsey RN

## 2017-09-19 NOTE — TELEPHONE ENCOUNTER
[facility] called the directions and quantity of HYDROcodone-acetaminophen need to be clarify, they don't match.  Phar contact info: [phone]      Pawel De La Rosa

## 2017-09-19 NOTE — TELEPHONE ENCOUNTER
Prior Authorization needed on:  9/19/17    Medication:  Hydrocod/Acetaminophen Dose:  7.5-325/15 ml    Pharmacy confirmed as   [facility] [id number] [address] [place], [place] [address] AT [place] [address]  [address] [facility] [address]  [facility] [address]  Phone: [phone] Fax: [phone]    Insurance Name:  Not listed  Insurance Phone: [phone]  Insurance Patient ID: [id number] (max 480ml per insurance)        Pawel De La Rosa September 19, 2017 at 3:11 PM

## 2017-09-21 ENCOUNTER — TRANSFERRED RECORDS (OUTPATIENT)
Dept: HEALTH INFORMATION MANAGEMENT | Facility: CLINIC | Age: 57
End: 2017-09-21

## 2017-09-21 NOTE — TELEPHONE ENCOUNTER
Spoke with [facility] pharmacist in clinic regarding limitations with the quantity of Hydrocodone liquid that can be dispensed at one time.

## 2017-09-21 NOTE — TELEPHONE ENCOUNTER
Fax received 09/21/17    Prior Authorization: Approved    Approved as of: 09/25/17 - 10/25/17    Writer placed form in Shanda's folder     Deloris Leon September 21, 2017 at 10:01 AM

## 2017-09-22 NOTE — TELEPHONE ENCOUNTER
Called [facility] regarding this approval. They said that she picked up the prescription 2 days ago and they will need a new Rx for the full 1800mL that was originally prescribed. They will also order a larger stock of it.     Shanda - Can you resend the prescription with the 1800mL as the quantity again with a note that it should go through now as 1,800mL for the prescription?     Thanks,  Judy Ernandez, PharmD  Medication Therapy Management Pharmacist  Pager: [phone]

## 2017-09-29 RX ORDER — HYDROCODONE BITARTRATE AND ACETAMINOPHEN 7.5; 325 MG/15ML; MG/15ML
15 SOLUTION ORAL 3 TIMES DAILY PRN
Qty: 1320 ML | Refills: 0 | Status: SHIPPED | OUTPATIENT
Start: 2017-09-29 | End: 2017-10-18

## 2017-09-29 NOTE — TELEPHONE ENCOUNTER
Refilled rx for 1320 ML remaining of 1800 ML. Spoke to staff and no concerns with the patient. ANTOINE CRAVEN

## 2017-09-29 NOTE — TELEPHONE ENCOUNTER
Pt called saying she only received 480 ml instead of the total 1800 ml of Hydrocodone, [facility] chow also called they will need a new prescription before remaining dose can be dispense.  Pt want to  med today, because her insurance will run out in 2 days.     Phar  contact info:  [phone]  Pt contact info: [phone]      Pawel De La Rosa

## 2017-09-29 NOTE — TELEPHONE ENCOUNTER
Will forward to MD pool for another provider to review and reorder.    Pt was only able to  480ml from last Rx.  Pharmacy needs a new Rx to prescribe more.    Nickolas Dempsey RN

## 2017-09-29 NOTE — TELEPHONE ENCOUNTER
Pt call again, checking on Hydrocodone.  Please call pt back as soon as possible.  Pt contact info: [phone]        Pawel De La Rosa

## 2017-09-29 NOTE — TELEPHONE ENCOUNTER
Rx filled by [facility].  Writer called Pt to inform that Rx would be available at .    Nickolas Dempsey RN

## 2017-10-16 ENCOUNTER — OFFICE VISIT (OUTPATIENT)
Dept: FAMILY MEDICINE | Facility: CLINIC | Age: 57
End: 2017-10-16
Payer: COMMERCIAL

## 2017-10-16 ENCOUNTER — OFFICE VISIT (OUTPATIENT)
Dept: BEHAVIORAL HEALTH | Facility: CLINIC | Age: 57
End: 2017-10-16
Payer: COMMERCIAL

## 2017-10-16 VITALS
OXYGEN SATURATION: 96 % | RESPIRATION RATE: 16 BRPM | BODY MASS INDEX: 18.14 KG/M2 | DIASTOLIC BLOOD PRESSURE: 76 MMHG | WEIGHT: 109 LBS | HEART RATE: 101 BPM | SYSTOLIC BLOOD PRESSURE: 122 MMHG | TEMPERATURE: 98.1 F

## 2017-10-16 DIAGNOSIS — G47.00 INSOMNIA, UNSPECIFIED TYPE: ICD-10-CM

## 2017-10-16 DIAGNOSIS — G89.4 CHRONIC PAIN SYNDROME: Primary | ICD-10-CM

## 2017-10-16 DIAGNOSIS — M45.0 ANKYLOSING SPONDYLITIS OF MULTIPLE SITES IN SPINE (H): ICD-10-CM

## 2017-10-16 DIAGNOSIS — F41.9 ANXIETY: Primary | ICD-10-CM

## 2017-10-16 DIAGNOSIS — C73 MALIGNANT NEOPLASM OF THYROID GLAND (H): ICD-10-CM

## 2017-10-16 PROCEDURE — 99215 OFFICE O/P EST HI 40 MIN: CPT | Performed by: NURSE PRACTITIONER

## 2017-10-16 PROCEDURE — 90834 PSYTX W PT 45 MINUTES: CPT | Performed by: SOCIAL WORKER

## 2017-10-16 RX ORDER — TEMAZEPAM 30 MG
30 CAPSULE ORAL
Qty: 30 CAPSULE | Refills: 0 | Status: SHIPPED | OUTPATIENT
Start: 2017-10-16 | End: 2017-11-29

## 2017-10-16 NOTE — MR AVS SNAPSHOT
"              After Visit Summary   10/16/2017    Alka Mcfadden    MRN: [id number]           Patient Information     Date Of Birth          1960        Visit Information        Provider Department      10/16/2017 3:30 PM Vaughn Eldridge, [facility]        Today's Diagnoses     Anxiety    -  1       Follow-ups after your visit        Who to contact     If you have questions or need follow up information about today's clinic visit or your schedule please contact [facility] directly at [phone].  Normal or non-critical lab and imaging results will be communicated to you by [employer], letter or phone within 4 business days after the clinic has received the results. If you do not hear from us within 7 days, please contact the clinic through [employer] or phone. If you have a critical or abnormal lab result, we will notify you by phone as soon as possible.  Submit refill requests through [employer] or call your pharmacy and they will forward the refill request to us. Please allow 3 business days for your refill to be completed.          Additional Information About Your Visit        MyChart Information     [employer] lets you send messages to your doctor, view your test results, renew your prescriptions, schedule appointments and more. To sign up, go to [email or web address] . Click on \"Log in\" on the left side of the screen, which will take you to the Welcome page. Then click on \"Sign up Now\" on the right side of the page.     You will be asked to enter the access code listed below, as well as some personal information. Please follow the directions to create your username and password.     Your access code is: [email or web address]  Expires: 2017  4:18 PM     Your access code will  in 90 days. If you need help or a new code, please call your [facility] or [phone].        Care EveryWhere ID     This is your Care EveryWhere ID. This could be " used by other organizations to access your [facility] medical records  [id number]         Blood Pressure from Last 3 Encounters:   10/16/17 122/76   09/18/17 128/72   06/13/17 98/76    Weight from Last 3 Encounters:   10/16/17 109 lb (49.4 kg)   09/18/17 106 lb 8 oz (48.3 kg)   06/13/17 98 lb (44.5 kg)              Today, you had the following     No orders found for display         Where to get your medicines      Some of these will need a paper prescription and others can be bought over the counter.  Ask your nurse if you have questions.     Bring a paper prescription for each of these medications     temazepam 30 MG capsule          Primary Care Provider Office Phone # Fax #    Shanda Kempgarrick APRGARRICK [phone]       [address] [facility] [address]  [facility] [address]        Equal Access to Services     PAULIE MARQUEZ : Hadii jerry chua hadasho Soomaali, waaxda luqadaha, qaybta kaalmada adeegyada, felipe rocha . So [facility] [phone].    ATENCIÓN: Si habla español, tiene a olmstead disposición servicios gratuitos de asistencia lingüística. Llame al [phone].    We comply with applicable federal civil rights laws and [place] laws. We do not discriminate on the basis of race, color, national origin, age, disability, sex, sexual orientation, or gender identity.            Thank you!     Thank you for choosing [facility] PRIMARY CARE  for your care. Our goal is always to provide you with excellent care. Hearing back from our patients is one way we can continue to improve our services. Please take a few minutes to complete the written survey that you may receive in the mail after your visit with us. Thank you!             Your Updated Medication List - Protect others around you: Learn how to safely use, store and throw away your medicines at www.disposemymeds.org.          This list is accurate as of: 10/16/17 11:59 PM.  Always use your most recent med list.                    Brand Name Dispense Instructions for use Diagnosis    ADVAIR DISKUS 250-50 MCG/DOSE diskus inhaler   Generic drug:  fluticasone-salmeterol      INHALE 1 PUFF PO Q 12 HOURS .TK ON A SCHEDULED BASIS FOR COPD    Chronic obstructive pulmonary disease with acute lower respiratory infection (H)       albuterol 108 (90 BASE) MCG/ACT Inhaler    PROAIR HFA/PROVENTIL HFA/VENTOLIN HFA    1 Inhaler    Inhale 2 puffs into the lungs every 4 hours as needed for shortness of breath / dyspnea or wheezing    Chronic obstructive pulmonary disease with acute lower respiratory infection (H)       diazepam 10 MG tablet    VALIUM    60 tablet    Take 0.5-1 tablets (5-10 mg) by mouth every 12 hours as needed for anxiety or sleep    Anxiety, Chronic pain syndrome       HYDROcodone-acetaminophen 7.5-325 MG/15ML solution    LORTAB    1320 mL    Take 15 mLs by mouth 3 times daily as needed for moderate to severe pain    Chronic pain syndrome       * MIRTAZAPINE PO      Take 15 mg by mouth At Bedtime        * mirtazapine 15 MG tablet    REMERON    30 tablet    Take 1 tablet (15 mg) by mouth At Bedtime    Recurrent major depressive disorder, in partial remission (H)       naloxone nasal spray    NARCAN    2 each    Spray 1 spray (4 mg) in nostril as needed for opioid reversal    Encounter for long-term use of opiate analgesic       omeprazole 40 MG capsule    priLOSEC    30 capsule    Take 1 capsule (40 mg) by mouth daily Take 30-60 minutes before a meal.    Haney's esophagus without dysplasia       ondansetron 4 MG tablet    ZOFRAN    40 tablet    Take 1 tablet (4 mg) by mouth every 8 hours as needed for nausea    Nausea       senna-docusate 8.6-50 MG per tablet    SENEXON-S    120 tablet    Take 2 tablets by mouth 2 times daily as needed for constipation    Encounter for long-term use of opiate analgesic       temazepam 30 MG capsule    RESTORIL    30 capsule    Take 1 capsule (30 mg) by mouth nightly as needed for sleep     Insomnia, unspecified type, Chronic pain syndrome       * Notice:  This list has 2 medication(s) that are the same as other medications prescribed for you. Read the directions carefully, and ask your doctor or other care provider to review them with you.

## 2017-10-16 NOTE — PROGRESS NOTES
"SUBJECTIVE:                                                    Alka Mcfadden is a 57 year old female who presents to clinic today for the following health issues:  [facility]: Vaughn Eldridge    Chronic Pain:  Her pain has been  Reports that the oxycodone made her feel \"teeny\". Is taking the Vicodin 15 ml (hydrocodone-acetaminophen 7.5-325 mg/15ml) every 6  hours, but pt doesn't take it from 10 PM to 3 AM. Was on 1.5, but she was \"watching the clock\" so she went up to 2. Has taken this medication schedule for a month.  reports that she switched from the pills to the liquid of Vicodin because her stomach couldn't handle it. Pt likes the liquid better.  asked how Tylenol would help.  notes that all medication is locked.      Feeling acutely aware of how many parts of her body hurt.  She is very concern about any up coming Surgery, what her experience lying on a surgical table will be like. .     Type / Location of Pain: Arthritis   Analgesia/pain control:       Recent changes:  worse      Overall control: Inadequate pain control  Activity level/function:      Daily activities:  Unable to perform most daily activities - chores, hobbies, social activities, driving    Work:  Unable to work  Adverse effects:  Yes  Adherance    Taking medication as directed?  No: stopped taking Oxycodone.  Removed from list.  Patient stated that they made her feel too icky. So she is taking Vicodin instead that she had gotten from a previous clinical visit.    Participating in other treatments: yes  Risk Factors:    Sleep:  Fair    Mood/anxiety:  slightly worsened    Recent family or social stressors:  New diagnosis of thyroid cancer    Other aggravating factors: prolonged sitting  PHQ-9 SCORE 12/8/2015 7/15/2016 12/5/2016   Total Score - - -   Total Score - - -   Total Score 0 0 0     JAMAL-7 SCORE 12/8/2015 7/15/2016 12/5/2016   Total Score - - -   Total Score 0 0 0   Total Score - - -     Encounter-Level [facility] - 04/28/2015:       " "   Controlled Substance Agreement - Scan on 5/1/2015 12:55 PM : Controlled Substance Agreement 04/28/15 (below)            Encounter-Level [facility]SA - 04/28/2015:          Controlled Substance Agreement - Scan on 1/23/2015  9:22 AM : Controlled Medication Agreement 01/05/15 (below)              Thyroid Cancer  The surgery has not been done because she was too thin, they wanted her to gain more weight. She is going down to [facility] on 10/19/2017 she believes for another biopsy, this time of the parathyroid.  States she asked too many questions and indicated that she would like to preserve as much of her thyroid as possible.  She believes that this next visit is to determine exactly what the surgery will be (if she chooses surgery).  Making statements today about participating in treatment of any sort.  Feels discouraged that [facility] offers so few clear answers.  She just wants to feel good again.      Insomnia  Reports that she is not getting enough sleep even though she is taking many medications which cause to be tired. Reports that temazepam helps greatly. Her  relates that he will watch and make sure that she doesn't take temazepam and narcotics together. Asked what Narcan is and why she would need to take it because she \"wouldn't do that [overdose].\"      Mental Health Follow up: Depression    Status since last visit: Labile, fluctuating course    See PHQ-9 for current symptoms.  Other associated symptoms: None    Complicating factors: chronic pain, sleep  Significant life event:  No               Current substance abuse:  None  Anxiety or Panic symptoms:  No     Reports that if she deliberately over-dosed, \"I would be very unhappy\" if someone used the narcan. \"I can only handle so much.\"     Medication, Weight  Is taking mirtazapine, has gained weight. Reports that she is always thinking about eating enough food, but still has stomach issues. Slightly improved mood, gradually improving weight.  Up " 9 lbs since June 2017.          Wt Readings from Last 5 Encounters:   09/18/17 48.3 kg (106 lb 8 oz)   06/13/17 44.5 kg (98 lb)   05/09/17 46.5 kg (102 lb 8 oz)   03/30/17 46.5 kg (102 lb 8 oz)   03/16/17 47.4 kg (104 lb 8 oz)               Social History   Substance Use Topics     Smoking status: Light Tobacco Smoker     Types: Cigarettes     Smokeless tobacco: Never Used      Comment: 3-4 cigarettes per week.     Alcohol use No        Problem list and histories reviewed & adjusted, as indicated.  Additional history: as documented    Patient Active Problem List   Diagnosis     Ankylosing spondylitis (H)     Encounter for long-term use of opiate analgesic     Hyperalgesia      ### Cataract, OU     Dry eyes     Hyperlipidemia LDL goal <130     Constipation     Insomnia     Tobacco use disorder     Open wound of knee, leg, and ankle     Anxiety     Chronic pain syndrome     Abdominal pain, right upper quadrant     Suicidal ideation     Nausea     Unexplained weight loss     Vaginal bleeding     Pancreatic atrophy     Haney's esophagus determined by biopsy     Chronic obstructive pulmonary disease (H) dx at [facility]     Malignant neoplasm of thyroid gland (H)     Complex regional pain syndrome type 2 of left lower extremity     Past Surgical History:   Procedure Laterality Date     ARTHROPLASTY REVISION HIP  2005, 2015     BACK SURGERY  1985/1990/1996     ENDOSCOPIC ULTRASOUND UPPER GASTROINTESTINAL TRACT (GI) N/A 1/5/2017    Procedure: ENDOSCOPIC ULTRASOUND, ESOPHAGOSCOPY / UPPER GASTROINTESTINAL TRACT (GI);  Surgeon: Esteban Mortensen MD;  Location: [facility] OR     ESOPHAGOSCOPY, GASTROSCOPY, DUODENOSCOPY (EGD), COMBINED N/A 1/5/2017    Procedure: COMBINED ESOPHAGOSCOPY, GASTROSCOPY, DUODENOSCOPY (EGD);  Surgeon: Esteban Mortensen MD;  Location: [facility] OR     JOINT REPLACEMENT  1985       Social History   Substance Use Topics     Smoking status: Light Tobacco Smoker     Types: Cigarettes     Smokeless tobacco: Never Used       Comment: 3-4 cigarettes per week.     Alcohol use No     History reviewed. No pertinent family history.        Current Outpatient Prescriptions   Medication Sig Dispense Refill     temazepam (RESTORIL) 30 MG capsule Take 1 capsule (30 mg) by mouth nightly as needed for sleep 30 capsule 0     HYDROcodone-acetaminophen (LORTAB) 7.5-325 MG/15ML solution Take 15 mLs by mouth 3 times daily as needed for moderate to severe pain 1320 mL 0     naloxone (NARCAN) nasal spray Spray 1 spray (4 mg) in nostril as needed for opioid reversal 2 each 1     MIRTAZAPINE PO Take 15 mg by mouth At Bedtime       senna-docusate (SENEXON-S) 8.6-50 MG per tablet Take 2 tablets by mouth 2 times daily as needed for constipation 120 tablet 0     mirtazapine (REMERON) 15 MG tablet Take 1 tablet (15 mg) by mouth At Bedtime 30 tablet 1     ondansetron (ZOFRAN) 4 MG tablet Take 1 tablet (4 mg) by mouth every 8 hours as needed for nausea 40 tablet 0     albuterol (PROAIR HFA/PROVENTIL HFA/VENTOLIN HFA) 108 (90 BASE) MCG/ACT Inhaler Inhale 2 puffs into the lungs every 4 hours as needed for shortness of breath / dyspnea or wheezing 1 Inhaler 3     ADVAIR DISKUS 250-50 MCG/DOSE diskus inhaler INHALE 1 PUFF PO Q 12 HOURS .TK ON A SCHEDULED BASIS FOR COPD  0     diazepam (VALIUM) 10 MG tablet Take 0.5-1 tablets (5-10 mg) by mouth every 12 hours as needed for anxiety or sleep 60 tablet 0     omeprazole (PRILOSEC) 40 MG capsule Take 1 capsule (40 mg) by mouth daily Take 30-60 minutes before a meal. 30 capsule 1     [DISCONTINUED] temazepam (RESTORIL) 30 MG capsule Take 1 capsule (30 mg) by mouth nightly as needed for sleep 30 capsule 0     Allergies   Allergen Reactions     Amoxicillin-Pot Clavulanate Rash     Augmentin Rash     Amitriptyline      Had reaction while on this and vistaril; was just not herself.     Hydroxyzine Other (See Comments)     Had reaction when took this along with amitriptyline.     Vistaril      Had reaction when took this  along with amitriptyline.     Recent Labs   Lab Test  02/02/17   1657  01/05/17   0904   12/01/16   1349  12/01/16   0937  10/09/14   LDL   --    --    --    --    --    --   126   HDL   --    --    --    --    --    --   39   TRIG   --    --    --    --    --    --   137   ALT  30  16   --    --   15   < >   --    CR  0.75  0.64   < >   --   0.68   < >   --    GFRESTIMATED  79  >90  Non  GFR Calc     < >   --   89   < >   --    GFRESTBLACK  >90   GFR Calc    >90   GFR Calc     < >   --   >90   GFR Calc     < >   --    POTASSIUM  4.4  4.2   < >   --   3.7   < >   --    TSH  0.70   --    --   0.34*   --    < >   --     < > = values in this interval not displayed.      BP Readings from Last 3 Encounters:   10/16/17 122/76   09/18/17 128/72   06/13/17 98/76    Wt Readings from Last 3 Encounters:   10/16/17 109 lb (49.4 kg)   09/18/17 106 lb 8 oz (48.3 kg)   06/13/17 98 lb (44.5 kg)        Labs reviewed in EPIC  Problem list, Medication list, Allergies, and Medical/Social/Surgical histories reviewed in [facility] and updated as appropriate.     ROS: Constitutional, neuro, ENT, endocrine, pulmonary, cardiac, gastrointestinal, genitourinary, musculoskeletal, integument and psychiatric systems are negative, except as otherwise noted above in the HPI.       OBJECTIVE:                                                    /76  Pulse 101  Temp 98.1  F (36.7  C) (Oral)  Resp 16  Wt 109 lb (49.4 kg)  SpO2 96%  BMI 18.14 kg/m2  Body mass index is 18.14 kg/(m^2).  GENERAL: Cachetic , alert, moderate psychological distress , well hydrated,   EYES: Eyes grossly normal to inspection, extraocular movements - intact, and PERRL  NECK: stiffness decreased range of motion; thyroid- modestly enlarged, has known nodule  RESP: lungs diminished to auscultation - no rales, no rhonchi,  Faint expiratory wheezes  CV: regular rates and rhythm, normal S1 S2, no S3 or S4 and no  murmur, no click or rub -   MS: extremities- no gross deformities noted, no edema  SKIN: no suspicious lesions, no rashes  NEURO: strength and tone- normal, sensory exam- grossly normal, mentation- intact, speech- normal,   Non focal no aphasia. No facial asymmetry. BACK: no CVA tenderness, no paralumbar tenderness  LYMPHATICS: ant. cervical- normal, post. cervical- normal, axillary- normal, supraclavicular- normal,   Mental Status Assessment:  Appearance:   Appropriate   Eye Contact:   Good   Psychomotor Behavior: Restless   Attitude:   Cooperative  Guarded   Orientation:   All  Speech   Rate / Production: Normal    Volume:  Normal   Mood:    Angry  Depressed   Affect:    Appropriate   Thought Content:  Clear   Thought Form:  Coherent  Logical   Insight:    Good   Attention Span and Concentration:  limited  Recent and Remote Memory:  intact  Fund of Knowledge: appropriate  Muscle Strength and Tone: normal   Suicidal Ideation: reports no thoughts, no intention  Hallucination: No  Paranoid-No  Manic-No  Panic-No  Self harm-No      See [facility] notes Don Toano    Diagnostic Test Results:  No results found for this or any previous visit (from the past 24 hour(s)).     ASSESSMENT/PLAN:                                                    (G89.4) Chronic pain syndrome  (primary encounter diagnosis)  Comment: Reports adequate pain relief with current dose of hydrocodone.  Taking liquid vicodin equal to 7.5 mg three times daily  Will need to be off this 7 days prior to surgery.   Plan: temazepam (RESTORIL) 30 MG capsule        Discussed use of PO oxycodone for the days prior to surgery.  Discussed rational for eliminating NSAIDS prior to surgery    (G47.00) Insomnia, unspecified type  Comment: Has found temazepam useful for many years.   Plan: temazepam (RESTORIL) 30 MG capsule        She reports understanding of potential complications of taking benzodiazepines and narcotics at the same time.  They have narcan at home and  " has been instructed on use.     (M45.0) Ankylosing spondylitis of multiple sites in spine (H)  Comment: Progressive acute on chronic pain.  Plan: Continue on Hydrocodone for pain at this time.  After visit to [place] we may switch to oxycodone up to 7 days prior to surgery    (C73) Malignant neoplasm of thyroid gland (H)  Comment: Many questions about thyroid cancer treatment, likely sneed that treatment will be useful and not add to her \"misery.\"  Plan: Discussion at length with [facility], , Alka, and this provider on the current state of management of thyroid cancer, about risks of leaving it untreated, and support of patient autonomy.  Given Pier reviewed article on Papillary thyroid cancer.            Patient Instructions:  Patient Instructions   Please call with questions from the article that we gave you in clinic today on Thyroid cancer.     Please let us know the outcome of the visit and biopsy at [facility] on 10/19/2017.    We will santamaria a prescription for Oxycodone if needed once you know the surgery date, to begin taking 7 days before surgery.       I spent Greater than 40 minutes was spent face to face with Alka Mcfadden, with greater than 50 % in counselling and consultation about Thyroid cancer treatment, ankylosiing spondylitits, Insomnia, and chronic pain.             BOBBY Butcher [facility]            "

## 2017-10-16 NOTE — MR AVS SNAPSHOT
After Visit Summary   10/16/2017    Alka Mcfadden    MRN: [id number]           Patient Information     Date Of Birth          1960        Visit Information        Provider Department      10/16/2017 3:30 PM Shanda Vo APRN CNP [facility]        Today's Diagnoses     Chronic pain syndrome    -  1    Insomnia, unspecified type        Ankylosing spondylitis of multiple sites in spine (H)        Malignant neoplasm of thyroid gland (H)          Care Instructions    Please call with questions from the article that we gave you in clinic today on Thyroid cancer.     Please let us know the outcome of the visit and biopsy at [facility] on 10/19/2017.    We will santamaria a prescription for Oxycodone if needed once you know the surgery date, to begin taking 7 days before surgery.               Follow-ups after your visit        Follow-up notes from your care team     Return in about 3 weeks (around 11/6/2017).      Who to contact     If you have questions or need follow up information about today's clinic visit or your schedule please contact [facility] directly at [phone].  Normal or non-critical lab and imaging results will be communicated to you by [employer], letter or phone within 4 business days after the clinic has received the results. If you do not hear from us within 7 days, please contact the clinic through [employer] or phone. If you have a critical or abnormal lab result, we will notify you by phone as soon as possible.  Submit refill requests through [employer] or call your pharmacy and they will forward the refill request to us. Please allow 3 business days for your refill to be completed.          Additional Information About Your Visit        MyChart Information     [employer] lets you send messages to your doctor, view your test results, renew your prescriptions, schedule appointments and more. To sign up, go to  [email or web address] . Click on \"Log in\" on the left side of the screen, which will take you to the Welcome page. Then click on \"Sign up Now\" on the right side of the page.     You will be asked to enter the access code listed below, as well as some personal information. Please follow the directions to create your username and password.     Your access code is: [email or web address]  Expires: 2017  4:18 PM     Your access code will  in 90 days. If you need help or a new code, please call your [facility] clinic or [phone].        Care EveryWhere ID     This is your Care EveryWhere ID. This could be used by other organizations to access your [facility] medical records  [id number]        Your Vitals Were     Pulse Temperature Respirations Pulse Oximetry BMI (Body Mass Index)       101 98.1  F (36.7  C) (Oral) 16 96% 18.14 kg/m2        Blood Pressure from Last 3 Encounters:   10/16/17 122/76   17 128/72   17 98/76    Weight from Last 3 Encounters:   10/16/17 109 lb (49.4 kg)   17 106 lb 8 oz (48.3 kg)   17 98 lb (44.5 kg)              Today, you had the following     No orders found for display         Where to get your medicines      Some of these will need a paper prescription and others can be bought over the counter.  Ask your nurse if you have questions.     Bring a paper prescription for each of these medications     temazepam 30 MG capsule          Primary Care Provider Office Phone # Fax #    Shanda Vo, BOBBY [phone]       [address] [address]        Equal Access to Services     SHANDRA MARQUEZ : Elías Pretty, gutierrez dominguez, felipe georges. So [facility] [phone].    ATENCIÓN: Si habla español, tiene a olmstead disposición servicios gratuitos de asistencia lingüística. Llame al [phone].    We comply with applicable federal civil rights laws and [place] laws. " We do not discriminate on the basis of race, color, national origin, age, disability, sex, sexual orientation, or gender identity.            Thank you!     Thank you for choosing [facility]  for your care. Our goal is always to provide you with excellent care. Hearing back from our patients is one way we can continue to improve our services. Please take a few minutes to complete the written survey that you may receive in the mail after your visit with us. Thank you!             Your Updated Medication List - Protect others around you: Learn how to safely use, store and throw away your medicines at www.disposemymeds.org.          This list is accurate as of: 10/16/17  5:50 PM.  Always use your most recent med list.                   Brand Name Dispense Instructions for use Diagnosis    ADVAIR DISKUS 250-50 MCG/DOSE diskus inhaler   Generic drug:  fluticasone-salmeterol      INHALE 1 PUFF PO Q 12 HOURS .TK ON A SCHEDULED BASIS FOR COPD    Chronic obstructive pulmonary disease with acute lower respiratory infection (H)       albuterol 108 (90 BASE) MCG/ACT Inhaler    PROAIR HFA/PROVENTIL HFA/VENTOLIN HFA    1 Inhaler    Inhale 2 puffs into the lungs every 4 hours as needed for shortness of breath / dyspnea or wheezing    Chronic obstructive pulmonary disease with acute lower respiratory infection (H)       diazepam 10 MG tablet    VALIUM    60 tablet    Take 0.5-1 tablets (5-10 mg) by mouth every 12 hours as needed for anxiety or sleep    Anxiety, Chronic pain syndrome       HYDROcodone-acetaminophen 7.5-325 MG/15ML solution    LORTAB    1320 mL    Take 15 mLs by mouth 3 times daily as needed for moderate to severe pain    Chronic pain syndrome       * MIRTAZAPINE PO      Take 15 mg by mouth At Bedtime        * mirtazapine 15 MG tablet    REMERON    30 tablet    Take 1 tablet (15 mg) by mouth At Bedtime    Recurrent major depressive disorder, in partial remission (H)       naloxone nasal  spray    NARCAN    2 each    Spray 1 spray (4 mg) in nostril as needed for opioid reversal    Encounter for long-term use of opiate analgesic       omeprazole 40 MG capsule    priLOSEC    30 capsule    Take 1 capsule (40 mg) by mouth daily Take 30-60 minutes before a meal.    Haney's esophagus without dysplasia       ondansetron 4 MG tablet    ZOFRAN    40 tablet    Take 1 tablet (4 mg) by mouth every 8 hours as needed for nausea    Nausea       senna-docusate 8.6-50 MG per tablet    SENEXON-S    120 tablet    Take 2 tablets by mouth 2 times daily as needed for constipation    Encounter for long-term use of opiate analgesic       temazepam 30 MG capsule    RESTORIL    30 capsule    Take 1 capsule (30 mg) by mouth nightly as needed for sleep    Insomnia, unspecified type, Chronic pain syndrome       * Notice:  This list has 2 medication(s) that are the same as other medications prescribed for you. Read the directions carefully, and ask your doctor or other care provider to review them with you.

## 2017-10-16 NOTE — NURSING NOTE
"Chief Complaint   Patient presents with     RECHECK       Initial /76  Pulse 101  Temp 98.1  F (36.7  C) (Oral)  Resp 16  Wt 109 lb (49.4 kg)  SpO2 96%  BMI 18.14 kg/m2 Estimated body mass index is 18.14 kg/(m^2) as calculated from the following:    Height as of 1/5/17: 5' 5\" (1.651 m).    Weight as of this encounter: 109 lb (49.4 kg).  Medication Reconciliation: complete     Oswaldo Santiago, ZULEIMA    "

## 2017-10-18 ENCOUNTER — TELEPHONE (OUTPATIENT)
Dept: FAMILY MEDICINE | Facility: CLINIC | Age: 57
End: 2017-10-18

## 2017-10-18 DIAGNOSIS — J20.9 ACUTE BRONCHITIS, UNSPECIFIED ORGANISM: Primary | ICD-10-CM

## 2017-10-18 DIAGNOSIS — G89.4 CHRONIC PAIN SYNDROME: ICD-10-CM

## 2017-10-18 RX ORDER — HYDROCODONE BITARTRATE AND ACETAMINOPHEN 7.5; 325 MG/15ML; MG/15ML
15 SOLUTION ORAL 3 TIMES DAILY PRN
Qty: 1000 ML | Refills: 0 | Status: SHIPPED | OUTPATIENT
Start: 2017-10-25 | End: 2017-10-26 | Stop reason: ALTCHOICE

## 2017-10-18 RX ORDER — DOXYCYCLINE 100 MG/1
100 TABLET ORAL 2 TIMES DAILY
Qty: 14 TABLET | Refills: 0 | Status: SHIPPED | OUTPATIENT
Start: 2017-10-18 | End: 2017-10-25

## 2017-10-18 NOTE — TELEPHONE ENCOUNTER
I can refill the doxycyline until she see the folks at [facility] on I believe 10/27/17.     I understood that she had 1 and 1/3rd bottles of the Hydrocodone liquid left.  I can write a prescription that can be filled in the future.  If she is taking it three times daily she should go through 315 ml per week. Each bottle has  473 ml.  She should have close to 13 days worth of medication left.      Printed and sent prescription and gave to staff RN    Shanda Vo, CNP, APNP  [facility]

## 2017-10-18 NOTE — TELEPHONE ENCOUNTER
Returned arlet to patient, patient was placed on a 10 day BID doxycycline course by [place], has 9 tablets left of course but thinks that she needs a second course, advised she should wait until the end of first course however patient states that she knows she always needs two courses and Shanda knows too. Requesting Shanda to prescribe second course of doxy. Pt has a frequent productive cough producing green sputum, has not checked her temperature but states that she has had the chills on and off, runny nose and sore thorat. Patient also requesting refill of Lortab liquid and would like provider to be aware she rescheduled her [facility] appt to 11/16 d/t illness.  Thank you!  Bhumi Sanchez RN

## 2017-10-18 NOTE — PROGRESS NOTES
[facility]   October 16, 2017      Behavioral Health Clinician Progress Note    Patient Name: Alka Mcfadden           Service Type: Individual      Service Location:   Face to Face in Clinic     Session Start Time: 3:40pm  Session End Time: 4:20pm      Session Length: 38 - 52      Attendees: Patient and Spouse / Significant Other    Visit Activities (Refresh list every visit): [facility]    Diagnostic Assessment Date: not completed yet  Treatment Plan Review Date: not completed yet  See Flowsheets for today's PHQ-9 and JAMAL-7 results  Previous PHQ-9:   PHQ-9 SCORE 12/8/2015 7/15/2016 12/5/2016   Total Score - - -   Total Score - - -   Total Score 0 0 0     Previous JAMAL-7:   JAMAL-7 SCORE 12/8/2015 7/15/2016 12/5/2016   Total Score - - -   Total Score 0 0 0   Total Score - - -       KENDRA LEVEL:  No flowsheet data found.    DATA  Extended Session (60+ minutes): No  Interactive Complexity: No  Crisis: No    Treatment Objective(s) Addressed in This Session:  Target Behavior(s): disease management/lifestyle changes mental health    Depressed Mood: Increase interest, engagement, and pleasure in doing things  Decrease frequency and intensity of feeling down, depressed, hopeless  Identify negative self-talk and behaviors: challenge core beliefs, myths, and actions  Improve concentration, focus, and mindfulness in daily activities   Anxiety: will experience a reduction in anxiety, will develop more effective coping skills to manage anxiety symptoms, will develop healthy cognitive patterns and beliefs and will increase ability to function adaptively  Psychological distress related to Pain  Psychological distress related to Chronic Disease Management    Current Stressors / Issues:  The patient was seen by [facility] per the request of the PCP.  She reported to continue to have intense worries concerning her health and how to proceed with treatment-regarding mood the patient reported to be continuing to  "struggle with depressive symptoms but she is able to experience carlos-the patient's  lost his job and as a result the patient had to \"scramble\" to access medical insurance which she was successful with accessing-she has been seen by specialist and she is considering her options for treatment and the PCP helped the patient with understanding the different procedures (cost and benefits)-this was helpful for the patient as she stated and she will consider this information and do some more research before she makes a decision-she talked about her frustration and anxiety as a result of having former procedures in 1986 and how these experiences have colored 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          Progress on Treatment Objective(s) / Homework:  No improvement - PREPARATION (Decided to change - considering how); Intervened by negotiating a change plan and determining options / strategies for behavior change, identifying triggers, exploring social supports, and working towards setting a date to begin behavior change    Motivational Interviewing    MI Intervention: Expressed Empathy/Understanding, Supported Autonomy, Collaboration, Evocation, Permission to raise concern or advise, Open-ended questions, Reflections: simple and complex, Rolled with resistance: Simple reflection, Complex reflection and Evoked patient agenda and Change talk (evoked)     Change Talk Expressed by the Patient: Desire to change Ability to change Reasons to change Need to change Committment to change Activation Taking steps    Provider Response to Change Talk: E - Evoked more info from patient about behavior change, A - Affirmed patient's thoughts, decisions, or attempts at behavior change, R - Reflected patient's change talk and S - Summarized patient's change talk statements      Care Plan review completed: No    Medication Review:  No changes to current psychiatric medication(s)    Medication " Compliance:  NA    Changes in Health Issues:   Yes: Pain, Associated Psychological Distress  Chronic disease management, Associated Psychological Distress    Chemical Use Review:   Substance Use: Chemical use reviewed, no active concerns identified      Tobacco Use: No current tobacco use.      Assessment: Current Emotional / Mental Status (status of significant symptoms):  Risk status (Self / Other harm or suicidal ideation)  Patient denies a history of suicidal ideation, suicide attempts, self-injurious behavior, homicidal ideation, homicidal behavior and and other safety concerns  Patient denies current fears or concerns for personal safety.  Patient denies current or recent suicidal ideation or behaviors.  Patient denies current or recent homicidal ideation or behaviors.  Patient denies current or recent self injurious behavior or ideation.  Patient denies other safety concerns.  A safety and risk management plan has not been developed at this time, however patient was encouraged to call [place] [phone] should there be a change in any of these risk factors.    Appearance:   Appropriate   Eye Contact:   Good   Psychomotor Behavior: Normal   Attitude:   Cooperative   Orientation:   All  Speech   Rate / Production: Normal    Volume:  Normal   Mood:    Normal  Affect:    Appropriate   Thought Content:  Clear   Thought Form:  Coherent  Goal Directed  Logical   Insight:    Good     Diagnoses:  1. Anxiety        Collateral Reports Completed:  Not Applicable    Plan: (Homework, other):  Patient was given information about behavioral services and encouraged to schedule a follow up appointment with the clinic [facility] as needed.  She was also given information about mental health symptoms and treatment options .  CD Recommendations: No indications of CD issues.  CHERY Benedict, [facility]      ______________________________________________________________________

## 2017-10-18 NOTE — TELEPHONE ENCOUNTER
Pt called her cough/cold has gotten worse since her last visit with Shanda on 10/16, pt requesting Doxycycline, per pt this med has help her in the past.   Pt want Shanda to that her appt with the [facility] has been re-schedule for 11/16.   Pt also want a refill on the Lortab.  Pt contact info:  [phone]      HYDROcodone-acetaminophen (LORTAB) 7.5-325 MG/15ML solution      Last Written Prescription Date: 9/29/17  Last Fill Quantity: 1320 ml,  # refills: 0   Last Office Visit with [facility], [facility] or [facility] prescribing provider: 10/16/17

## 2017-10-19 NOTE — TELEPHONE ENCOUNTER
Writer called and informed Pt of message by Marlin.  Rx placed at  for Pt to .    Nickolas Dempsey RN

## 2017-10-26 ENCOUNTER — TELEPHONE (OUTPATIENT)
Dept: FAMILY MEDICINE | Facility: CLINIC | Age: 57
End: 2017-10-26

## 2017-10-26 DIAGNOSIS — G89.4 CHRONIC PAIN SYNDROME: ICD-10-CM

## 2017-10-26 RX ORDER — HYDROCODONE BITARTRATE AND ACETAMINOPHEN 7.5; 325 MG/1; MG/1
1 TABLET ORAL 3 TIMES DAILY PRN
Qty: 90 TABLET | Refills: 0 | Status: SHIPPED | OUTPATIENT
Start: 2017-10-26 | End: 2017-11-06

## 2017-10-26 NOTE — TELEPHONE ENCOUNTER
Reason for Call:  Other prescription    Detailed comments: Pt is wondering if her HYDROcodone-acetaminophen (LORTAB) 7.5-325 MG script can be switched to tablets, because her insurance requires a $150 co-pay for the solution. Pt would like script mailed to [facility] [address] [place] [address]    Phone Number Patient can be reached at: Home number on file [phone] (home)    Best Time: Anytime    Can we leave a detailed message on this number? YES    Call taken on 10/26/2017 at 1:03 PM by Deloris Leon

## 2017-10-26 NOTE — TELEPHONE ENCOUNTER
Script mailed to pharmacy, called and informed patient. Pt states that she is having issues getting insurance through medicare and has had to postpone her appt at the [facility] until 11/16 due to lack of insurance coverage. Referral sent to  to assist patient with insurance issues.   Bhumi Sanchez RN

## 2017-10-27 ENCOUNTER — CARE COORDINATION (OUTPATIENT)
Dept: CARE COORDINATION | Facility: CLINIC | Age: 57
End: 2017-10-27

## 2017-11-03 NOTE — TELEPHONE ENCOUNTER
Writer called pt and informed that she will have to continue to call pharmacy to see when Rx comes in.    Nickolas Dempsey RN

## 2017-11-03 NOTE — TELEPHONE ENCOUNTER
Reason for Call:   call back    Detailed comments: pt called she spoke with her phar and they have not receive the Norco Rx, pt want to know what should she do in the meantime. Rx was mailed to phar last week.     Phone Number Patient can be reached at: Home number on file [phone] (home)    Best Time: anytime    Can we leave a detailed message on this number? YES    Call taken on 11/3/2017 at 11:26 AM by Pawel De La Rosa

## 2017-11-06 RX ORDER — HYDROCODONE BITARTRATE AND ACETAMINOPHEN 7.5; 325 MG/1; MG/1
1 TABLET ORAL 3 TIMES DAILY PRN
Qty: 90 TABLET | Refills: 0 | Status: SHIPPED | OUTPATIENT
Start: 2017-11-06 | End: 2017-11-29

## 2017-11-06 NOTE — TELEPHONE ENCOUNTER
[place] and [place] not filled script mailed 10/26. Please review and advise.  Thank you!  Bhumi Sanchez RN

## 2017-11-06 NOTE — TELEPHONE ENCOUNTER
Putting stop fill on prescription written by RAJAT Urrutia CNP.     New prescription written for her to  at the office.   Shanda Vo CNP, APNP  [facility]

## 2017-11-06 NOTE — TELEPHONE ENCOUNTER
Pt called the phar still hasn't receive the Blanket Rx. Rx mailed out on 10/26.   Pt want to know what can be done.       Pawel De La Rosa

## 2017-11-13 DIAGNOSIS — F33.41 RECURRENT MAJOR DEPRESSIVE DISORDER, IN PARTIAL REMISSION (H): ICD-10-CM

## 2017-11-13 RX ORDER — MIRTAZAPINE 15 MG/1
TABLET, FILM COATED ORAL
Qty: 30 TABLET | Refills: 0 | Status: SHIPPED | OUTPATIENT
Start: 2017-11-13 | End: 2018-02-21

## 2017-11-29 DIAGNOSIS — G89.4 CHRONIC PAIN SYNDROME: ICD-10-CM

## 2017-11-29 NOTE — TELEPHONE ENCOUNTER
HYDROcodone-acetaminophen (NORCO) 7.5-325 MG per tablet  Controlled Substance Refill Request    [place]: [place]17 [place]    Last clinic visit: 10/16/17    Next appt: none scheduled     Controlled substance agreement on file: No.    Documentation in problem list reviewed:  Yes    Processing:  Patient will  in clinic    [place] reviewed:  reviewed- no concerns  [place]:  [email or web address]      Nickolas Dempsey RN

## 2017-11-30 RX ORDER — HYDROCODONE BITARTRATE AND ACETAMINOPHEN 7.5; 325 MG/1; MG/1
1 TABLET ORAL 3 TIMES DAILY PRN
Qty: 90 TABLET | Refills: 0 | Status: SHIPPED | OUTPATIENT
Start: 2017-11-30 | End: 2018-01-04 | Stop reason: ALTCHOICE

## 2017-12-13 ENCOUNTER — TELEPHONE (OUTPATIENT)
Dept: FAMILY MEDICINE | Facility: CLINIC | Age: 57
End: 2017-12-13

## 2017-12-13 DIAGNOSIS — G89.4 CHRONIC PAIN SYNDROME: ICD-10-CM

## 2017-12-13 DIAGNOSIS — F41.9 ANXIETY: ICD-10-CM

## 2017-12-13 RX ORDER — DIAZEPAM 10 MG
5-10 TABLET ORAL EVERY 12 HOURS PRN
Qty: 60 TABLET | Refills: 0 | Status: SHIPPED | OUTPATIENT
Start: 2017-12-13 | End: 2018-01-04

## 2017-12-13 NOTE — TELEPHONE ENCOUNTER
Reason for call:  Patient reporting a symptom    Symptom or request:  vibration    Duration (how long have symptoms been present): Writer forgot to ask pt.   Have you been treated for this before? No    Additional comments: Pt states that the pain from her hip has been radiating to her vagina causing vibration. Pt states that she is scheduled for thermal imaging soon. Pt would also like PCP to know that she is having thyroid surgery at the [facility] on 01/18/17.       Phone Number patient can be reached at:  Home number on file [phone] (home)    Best Time:  Anytime    Can we leave a detailed message on this number:  YES    Call taken on 12/13/2017 at 1:04 PM by Deloris Leon

## 2017-12-13 NOTE — TELEPHONE ENCOUNTER
"Returned call to patient. Patient states that her symptoms began two weeks ago. That she is having \"lighning\" nerve pain that starts in her left hip and shoots in to her labia, states it feels like she has a cattle prod in her vagina. Patient states that it has become more frequent over the last couple of weeks. Patient endorses low back pain as well but states that is a long standing issue, she is having thermal imaging(?) at the [facility] on January 18th, pt states that she just had a scan done at [facility] before this started and states they told her she has   \"complex regional pain syndrome\". Pt denies any new incontinence. Offered to assist making an appt with PCP for pt, pt declined stating \"what is she going to do for me?\" Writer encouraged appointment for enaluation, possible imaging. Pt states that she would just like a refill of her valium, it helps her nerve pain and her anxiety, states that she has a lot of anxiety with her upcoming thyroid surgery.   Please review and advise.  Thank you,  Bhumi Sanchez RN     "

## 2017-12-13 NOTE — TELEPHONE ENCOUNTER
diazepam (VALIUM) 10 MG tablet  Controlled Substance Refill Request    Last refill: 2/7/17, [place]12[place]17 [place]    Last clinic visit: 10/16/17    Next appt: none scheduled     Controlled substance agreement on file: No.    Documentation in problem list reviewed:  Yes    Processing:  call/fax    [place] reviewed:  reviewed- no concerns  [place]  [email or web address]    Nickolas Dempsey RN

## 2017-12-14 NOTE — TELEPHONE ENCOUNTER
I am ok with refilling the diazepam since she is using it sparingly.  2 fills in the past year.  I would however, like to know how she is doing and if she is having treatment via the [facility]/hospitals for the diagnosed thyroid cancer.      I have not seen her since October and she had postponed the  Surgery at that point.     Shanda Vo, CNP, APNP  [facility]

## 2017-12-14 NOTE — TELEPHONE ENCOUNTER
Writer called pt and informed that refill was faxed to her pharmacy.    Pt stated that she is having thyroid surgery at the [place] on the 18th.  She will make an Appt with Marlin after the surgery when she knows how she is doing.    Pt wanted to wish Marlin boone.    Nickolas Dempsey RN

## 2017-12-18 ENCOUNTER — TRANSFERRED RECORDS (OUTPATIENT)
Dept: HEALTH INFORMATION MANAGEMENT | Facility: CLINIC | Age: 57
End: 2017-12-18

## 2017-12-20 ENCOUNTER — TELEPHONE (OUTPATIENT)
Dept: FAMILY MEDICINE | Facility: CLINIC | Age: 57
End: 2017-12-20

## 2017-12-20 DIAGNOSIS — G57.72 COMPLEX REGIONAL PAIN SYNDROME TYPE 2 OF LEFT LOWER EXTREMITY: Primary | ICD-10-CM

## 2017-12-20 DIAGNOSIS — G89.4 CHRONIC PAIN SYNDROME: ICD-10-CM

## 2017-12-20 DIAGNOSIS — Z79.891 ENCOUNTER FOR LONG-TERM USE OF OPIATE ANALGESIC: ICD-10-CM

## 2017-12-20 DIAGNOSIS — C73 MALIGNANT NEOPLASM OF THYROID GLAND (H): ICD-10-CM

## 2017-12-20 NOTE — TELEPHONE ENCOUNTER
Reason for Call:  TYLER    Detailed comments: pt wanted to update Shanda on her thyroid surgery on 12/18 at the [facility], pt stated everything looks clear all the cancer was removed, pt said she has pain in her legs, and it hurts when she moves. Also she had no radioactive iodine done.     Phone Number Patient can be reached at: Home number on file [phone] (home)    Best Time: anytime    Can we leave a detailed message on this number? YES    Call taken on 12/20/2017 at 10:42 AM by Pawel De La Rosa

## 2017-12-22 RX ORDER — OXYCODONE AND ACETAMINOPHEN 5; 325 MG/1; MG/1
1-2 TABLET ORAL EVERY 4 HOURS PRN
Qty: 96 TABLET | Refills: 0 | Status: SHIPPED | OUTPATIENT
Start: 2017-12-22 | End: 2017-12-22

## 2017-12-22 RX ORDER — OXYCODONE AND ACETAMINOPHEN 5; 325 MG/1; MG/1
1-2 TABLET ORAL EVERY 4 HOURS PRN
Qty: 96 TABLET | Refills: 0 | Status: SHIPPED | OUTPATIENT
Start: 2017-12-22 | End: 2018-01-04

## 2017-12-22 NOTE — TELEPHONE ENCOUNTER
Called patient and reviewed message below from provider. Pt has appt 1/4 with provider for follow up. Pt would like to try percocet. Please write script while in clinic today. Pt states that her bowel movements have been regular on the senna.  Bhumi Sanchez RN

## 2017-12-22 NOTE — TELEPHONE ENCOUNTER
Pt stated that she is doing horrible pain wise.      Pt was taking Torodol, Tylenol, and 5 mg of oxycodone while in the hospital and she said her pain was good.        Pt is on only Vicodin now.  1 tab every 8 hours as needed.     Pt stated that its long and not holding,  Pain is the hip, leg, and foot pain.      Incisional pain is tolerable but her chronic pain in hip, leg, and foot pain is very bad right now.      If rob could help with the pain that would be wonderful.      Pt stated that she is very thankful for rob.

## 2017-12-22 NOTE — TELEPHONE ENCOUNTER
Unfortunately maintenance on Tordol is not safe.  However she could return to using oxycodone and tylenol if she would like to. This could be percocet or oxycodone and acetaminophen separately, however the second option is more pills.      How is she doing constipation wise.  This has been a serious issue in the past and I would recommend that she try to stay ahead of constipation.     Let me know and I can write a script that someone in the office could sigh.  Otherwise I plan to be the building this afternoon around 2 pm and could sign it.     Shanda Vo, CNP, APNP  [facility]

## 2017-12-28 ENCOUNTER — TELEPHONE (OUTPATIENT)
Dept: FAMILY MEDICINE | Facility: CLINIC | Age: 57
End: 2017-12-28

## 2017-12-28 DIAGNOSIS — K80.20 CALCULUS OF GALLBLADDER WITHOUT CHOLECYSTITIS WITHOUT OBSTRUCTION: Primary | ICD-10-CM

## 2017-12-28 NOTE — TELEPHONE ENCOUNTER
Reason for Call:  Other prescription    Detailed comments:  Pt states that the [facility] would like her to get an ultrasound of her gallstone and left upper quadrant. Pt would like to get it done at [place] on 01/04/18.    Phone Number Patient can be reached at: Home number on file [phone] (home)    Best Time: Anytime    Can we leave a detailed message on this number? YES    Call taken on 12/28/2017 at 2:06 PM by Deloris Leon

## 2017-12-29 PROBLEM — K80.20 CALCULUS OF GALLBLADDER WITHOUT CHOLECYSTITIS: Status: ACTIVE | Noted: 2017-12-29

## 2017-12-29 NOTE — TELEPHONE ENCOUNTER
Order placed.  They should call and schedule this.  Could probably call on Tuesday and make an appointment.    They could be given the phone number to make their own appointment.   Shanda Vo CNP, APNP  [facility]

## 2018-01-04 ENCOUNTER — OFFICE VISIT (OUTPATIENT)
Dept: BEHAVIORAL HEALTH | Facility: CLINIC | Age: 58
End: 2018-01-04
Payer: COMMERCIAL

## 2018-01-04 ENCOUNTER — HOSPITAL ENCOUNTER (OUTPATIENT)
Dept: ULTRASOUND IMAGING | Facility: CLINIC | Age: 58
Discharge: HOME | End: 2018-01-04
Attending: NURSE PRACTITIONER | Admitting: NURSE PRACTITIONER
Payer: MEDICARE

## 2018-01-04 ENCOUNTER — OFFICE VISIT (OUTPATIENT)
Dept: FAMILY MEDICINE | Facility: CLINIC | Age: 58
End: 2018-01-04
Payer: COMMERCIAL

## 2018-01-04 VITALS
WEIGHT: 112.5 LBS | RESPIRATION RATE: 16 BRPM | OXYGEN SATURATION: 97 % | TEMPERATURE: 97.6 F | HEART RATE: 72 BPM | SYSTOLIC BLOOD PRESSURE: 102 MMHG | DIASTOLIC BLOOD PRESSURE: 64 MMHG | BODY MASS INDEX: 18.72 KG/M2

## 2018-01-04 DIAGNOSIS — Z79.891 ENCOUNTER FOR LONG-TERM USE OF OPIATE ANALGESIC: ICD-10-CM

## 2018-01-04 DIAGNOSIS — G89.4 CHRONIC PAIN SYNDROME: ICD-10-CM

## 2018-01-04 DIAGNOSIS — K80.20 CALCULUS OF GALLBLADDER WITHOUT CHOLECYSTITIS WITHOUT OBSTRUCTION: ICD-10-CM

## 2018-01-04 DIAGNOSIS — C73 MALIGNANT NEOPLASM OF THYROID GLAND (H): ICD-10-CM

## 2018-01-04 DIAGNOSIS — G57.72 COMPLEX REGIONAL PAIN SYNDROME TYPE 2 OF LEFT LOWER EXTREMITY: ICD-10-CM

## 2018-01-04 DIAGNOSIS — F41.9 ANXIETY: ICD-10-CM

## 2018-01-04 DIAGNOSIS — F41.9 ANXIETY: Primary | ICD-10-CM

## 2018-01-04 PROCEDURE — 76700 US EXAM ABDOM COMPLETE: CPT

## 2018-01-04 PROCEDURE — 99215 OFFICE O/P EST HI 40 MIN: CPT | Performed by: NURSE PRACTITIONER

## 2018-01-04 PROCEDURE — 90834 PSYTX W PT 45 MINUTES: CPT | Performed by: SOCIAL WORKER

## 2018-01-04 RX ORDER — AMOXICILLIN 250 MG
2 CAPSULE ORAL 2 TIMES DAILY PRN
Qty: 120 TABLET | Refills: 0 | Status: SHIPPED | OUTPATIENT
Start: 2018-01-04

## 2018-01-04 RX ORDER — OXYCODONE AND ACETAMINOPHEN 5; 325 MG/1; MG/1
1-2 TABLET ORAL EVERY 4 HOURS PRN
Qty: 240 TABLET | Refills: 0 | Status: SHIPPED | OUTPATIENT
Start: 2018-01-04 | End: 2018-01-24

## 2018-01-04 RX ORDER — OXYCODONE AND ACETAMINOPHEN 5; 325 MG/1; MG/1
1-2 TABLET ORAL EVERY 4 HOURS PRN
Qty: 96 TABLET | Refills: 0 | Status: SHIPPED | OUTPATIENT
Start: 2018-01-04 | End: 2018-01-04

## 2018-01-04 RX ORDER — DIAZEPAM 10 MG
5-10 TABLET ORAL EVERY 12 HOURS PRN
Qty: 60 TABLET | Refills: 0 | Status: SHIPPED | OUTPATIENT
Start: 2018-01-11 | End: 2018-02-21

## 2018-01-04 NOTE — PROGRESS NOTES
"SUBJECTIVE:                                                    Alka Mcfadden is a 57 year old female who presents to clinic today with her  for the following health issues:  [facility]: [facility]    Chronic Pain Follow-Up  Pt reports the Valium and Percocet combination helps alleviate nerve pain.  Reports she was only taking this medication combination before the procedure and currently takes 1/2 (5 mg)  tablets of Diazapam if the pain is not manageable. Notes she does not take Diazepam daily. Her  reports that pt does not take the Oxycodone as prescribed.  Sometimes she skips a dose.Then when she is in pain, she may double her dose one time. Pt relates that she doesn't take it consistently because she's scared of running out and tries to stretch it out. Requesting a refill of Oxycodone. Asked about Tordol because it helped her \"tremendously.\"  She has received injections of Tordol in the hospital and it has been wonderful.    Discussed about the complications for being treated for chronic pain due to \"those who abuse it.\" Was with Dr. Hua for 20 years and relates that she would never cross the line for it would ruin things for a lot of people. States Dr. Hua told her that even if she took too much, she could not become addicted. She understands that she is physically dependent.  Understands that she needs to be honest with her providers and herself.    Leg Pain: left, with complex regional pain syndrome.   Would rate the lowest leg pain a 6/10 and then 10+/10 at it's worst.    Hip Pain  She is continuing to work with [facility] regarding pain in her left hip. Will be doing a \"sweat test\" at [facility].    Type / Location of Pain: Arthritis  Analgesia/pain control:       Recent changes:  Same      Overall control: Inadequate pain control  Activity level/function:      Daily activities:  Unable to perform most daily activities - chores, hobbies, social activities, driving    Work:  Unable to " work  Adverse effects:  Yes   Adherance    Taking medication as directed?     Participating in other treatments: yes  Risk Factors:    Sleep:  Good    Mood/anxiety:  improved    Recent family or social stressors:  Recent diagnosis and surgery for thyroid cancer.    Other aggravating factors: prolonged sitting and prolonged standing  PHQ-9 SCORE 12/8/2015 7/15/2016 12/5/2016   Total Score - - -   Total Score - - -   Total Score 0 0 0     JAMAL-7 SCORE 12/8/2015 7/15/2016 12/5/2016   Total Score - - -   Total Score 0 0 0   Total Score - - -     Encounter-Level CSA - 04/28/2015:          Controlled Substance Agreement - Scan on 5/1/2015 12:55 PM : Controlled Substance Agreement 04/28/15 (below)            Encounter-Level CSA - 04/28/2015:          Controlled Substance Agreement - Scan on 1/23/2015  9:22 AM : Controlled Medication Agreement 01/05/15 (below)               Thyroid S/P Surgery  Had the surgery, pt's  reports that her full thyroid was not removed. States that there were some complications before the surgery and she did panic, but notes that the surgery went well. However she has some neck pain, which she rates a 6/10. The surgeon would like PCP to do lab work after 8 weeks of procedure.    Mental Health Follow up: Depression    Status since last visit: fluctuating course    See PHQ-9 for current symptoms.  Other associated symptoms: None    Complicating factors: chronic pain  Significant life event:  No   Current substance abuse:  None  Anxiety or Panic symptoms:  No    Mood is good. She states that she is no longer as irritable. Has had panic attacks recently.    Anxiety and adjunct support with pain   Valium helps with anxiety,  does not think pt needs a refill.  She struggles with anxiety that she will not get pain relief.  Has tried several other medications for anxiety,  At times with limited success.  However, long term she has benefited most from diazepam.      Moving to [place]  Pt  reports that she and her  has plans to move and retire in [facility].    Appetite, Weight  Pt's appetite is good and has gained weight. This is a significant improvement.  She credits the treatment for thyroid cancer as the reason she is doing better.     Wt Readings from Last 5 Encounters:   01/04/18 51 kg (112 lb 8 oz)   10/16/17 49.4 kg (109 lb)   09/18/17 48.3 kg (106 lb 8 oz)   06/13/17 44.5 kg (98 lb)   05/09/17 46.5 kg (102 lb 8 oz)       PHQ-9  English PHQ-9   Any Language           Additional Notes  Her  relates that pt has an ultrasound scheduled today for her gallbladder. Requesting refill of Senexon.      Problems taking medications regularly: Yes, managing pain medication herself    Social History   Substance Use Topics     Smoking status: Light Tobacco Smoker     Types: Cigarettes     Smokeless tobacco: Never Used      Comment: 3-4 cigarettes per week.     Alcohol use No      Problem list and histories reviewed & adjusted, as indicated.  Additional history: as documented    Patient Active Problem List   Diagnosis     Encounter for long-term use of opiate analgesic     Hyperalgesia      ### Cataract, OU     Dry eyes     Hyperlipidemia LDL goal <130     Constipation     Insomnia     Tobacco use disorder     Anxiety     Chronic pain syndrome     Suicidal ideation     Nausea     Pancreatic atrophy     Haney's esophagus determined by biopsy     Chronic obstructive pulmonary disease (H) dx at [facility]     Malignant neoplasm of thyroid gland (H)     Complex regional pain syndrome type 2 of left lower extremity     Ankylosing spondylitis of multiple sites in spine (H)     Calculus of gallbladder without cholecystitis     Past Surgical History:   Procedure Laterality Date     ARTHROPLASTY REVISION HIP  2005, 2015     BACK SURGERY  1985/1990/1996     ENDOSCOPIC ULTRASOUND UPPER GASTROINTESTINAL TRACT (GI) N/A 1/5/2017    Procedure: ENDOSCOPIC ULTRASOUND, ESOPHAGOSCOPY / UPPER  GASTROINTESTINAL TRACT (GI);  Surgeon: Esteban Mortensen MD;  Location: [facility] OR     ESOPHAGOSCOPY, GASTROSCOPY, DUODENOSCOPY (EGD), COMBINED N/A 1/5/2017    Procedure: COMBINED ESOPHAGOSCOPY, GASTROSCOPY, DUODENOSCOPY (EGD);  Surgeon: Esteban Mortensen MD;  Location: [facility] OR     JOINT REPLACEMENT  1985       Social History   Substance Use Topics     Smoking status: Light Tobacco Smoker     Types: Cigarettes     Smokeless tobacco: Never Used      Comment: 3-4 cigarettes per week.     Alcohol use No     History reviewed. No pertinent family history.        Current Outpatient Prescriptions   Medication Sig Dispense Refill     oxyCODONE-acetaminophen (PERCOCET) 5-325 MG per tablet Take 1-2 tablets by mouth every 4 hours as needed for pain maximum 8 tablet(s) per day 96 tablet 0     diazepam (VALIUM) 10 MG tablet Take 0.5-1 tablets (5-10 mg) by mouth every 12 hours as needed for anxiety or sleep 60 tablet 0     temazepam (RESTORIL) 30 MG capsule TAKE ONE CAPSULE (30MG) BY MOUTH NIGHTLY AS NEEDED FOR SLEEP 30 capsule 0     senna-docusate (SENEXON-S) 8.6-50 MG per tablet Take 2 tablets by mouth 2 times daily as needed for constipation 120 tablet 0     HYDROcodone-acetaminophen (NORCO) 7.5-325 MG per tablet Take 1 tablet by mouth 3 times daily as needed for moderate to severe pain maximum 3 tablet(s) per day (Patient not taking: Reported on 1/4/2018) 90 tablet 0     mirtazapine (REMERON) 15 MG tablet TAKE 1 TABLET(15 MG) BY MOUTH AT BEDTIME (Patient not taking: Reported on 1/4/2018) 30 tablet 0     naloxone (NARCAN) nasal spray Spray 1 spray (4 mg) in nostril as needed for opioid reversal (Patient not taking: Reported on 1/4/2018) 2 each 1     MIRTAZAPINE PO Take 15 mg by mouth At Bedtime       ondansetron (ZOFRAN) 4 MG tablet Take 1 tablet (4 mg) by mouth every 8 hours as needed for nausea (Patient not taking: Reported on 1/4/2018) 40 tablet 0     albuterol (PROAIR HFA/PROVENTIL HFA/VENTOLIN HFA) 108 (90 BASE) MCG/ACT  Inhaler Inhale 2 puffs into the lungs every 4 hours as needed for shortness of breath / dyspnea or wheezing (Patient not taking: Reported on 1/4/2018) 1 Inhaler 3     ADVAIR DISKUS 250-50 MCG/DOSE diskus inhaler INHALE 1 PUFF PO Q 12 HOURS .TK ON A SCHEDULED BASIS FOR COPD  0     omeprazole (PRILOSEC) 40 MG capsule Take 1 capsule (40 mg) by mouth daily Take 30-60 minutes before a meal. (Patient not taking: Reported on 1/4/2018) 30 capsule 1     Allergies   Allergen Reactions     Amoxicillin-Pot Clavulanate Rash     Augmentin Rash     Amitriptyline      Had reaction while on this and vistaril; was just not herself.     Hydroxyzine Other (See Comments)     Had reaction when took this along with amitriptyline.     Vistaril      Had reaction when took this along with amitriptyline.     Recent Labs   Lab Test  02/02/17   1657  01/05/17   0904   12/01/16   1349  12/01/16   0937  10/09/14   LDL   --    --    --    --    --    --   126   HDL   --    --    --    --    --    --   39   TRIG   --    --    --    --    --    --   137   ALT  30  16   --    --   15   < >   --    CR  0.75  0.64   < >   --   0.68   < >   --    GFRESTIMATED  79  >90  Non  GFR Calc     < >   --   89   < >   --    GFRESTBLACK  >90   GFR Calc    >90   GFR Calc     < >   --   >90   GFR Calc     < >   --    POTASSIUM  4.4  4.2   < >   --   3.7   < >   --    TSH  0.70   --    --   0.34*   --    < >   --     < > = values in this interval not displayed.      BP Readings from Last 3 Encounters:   01/04/18 102/64   10/16/17 122/76   09/18/17 128/72    Wt Readings from Last 3 Encounters:   01/04/18 51 kg (112 lb 8 oz)   10/16/17 49.4 kg (109 lb)   09/18/17 48.3 kg (106 lb 8 oz)        Labs reviewed in EP[facility]  Problem list, Medication list, Allergies, and Medical/Social/Surgical histories reviewed in [facility] and updated as appropriate.     ROS: Constitutional, neuro, ENT, endocrine, pulmonary, cardiac,  gastrointestinal, genitourinary, musculoskeletal, integument and psychiatric systems are negative, except as otherwise noted above in the HPI.    This document serves as a record of the services and decisions personally performed and made by Shanda Vo CNP. It was created on her behalf by Socorro Lyon, a trained medical scribe. The creation of this document is based on the provider's statements to the medical scribe.  Socorro Lyon 11:35 AM 1/4/2018    OBJECTIVE:                                                    /64  Pulse 72  Temp 97.6  F (36.4  C) (Oral)  Resp 16  Wt 51 kg (112 lb 8 oz)  SpO2 97%  BMI 18.72 kg/m2  Body mass index is 18.72 kg/(m^2).  GENERAL: thin, alert, well nourished, well hydrated  EYES/ENT: PERRL, no cervical, pre or post auricular nodes or sinus tenderness  Cardiovascular: negative findings: regular rate and rhythm, S1 normal, S2 normal, no murmur   Respiratory :respiratory rate and rhythm regular, lungs clear to auscultation, no rales, or rhonchi auscultated.   Abdomen: Abdomen soft, non-tender. BS normal. No masses, organomegaly  NEURO: Gait stiff, favoring left hip and leg.  Sensation grossly WNL on the right, Left with profound numbness, wearing AFO  SKIN: lesion from thyroid surgery well healed,  no suspicious lesions or rashes   Mental Status Assessment:  Appearance:   Appropriate   Eye Contact:   Good   Psychomotor Behavior: Normal   Attitude:   Cooperative   Orientation:   All  Speech   Rate / Production: Normal    Volume:  Normal   Mood:    Neutral   Affect:    Appropriate   Thought Content:  Clear   Thought Form:  Coherent  Logical   Insight:    Good   Attention Span and Concentration:  good  Recent and Remote Memory:  intact  Fund of Knowledge: appropriate  Muscle Strength and Tone: normal   Suicidal Ideation: reports no thoughts, no intention  Hallucination: No  Paranoid-No  Manic-No  Panic-No  Self harm-No    See [facility] notes      ASSESSMENT/PLAN:                "                                     (G89.4) Chronic pain syndrome  Comment: Long history of chronic pain and trials of chronic pain medication.  Most  Functional on current regimen.  Plan: diazepam (VALIUM) 10 MG tablet,         oxyCODONE-acetaminophen (PERCOCET) 5-325 MG per        tablet,  Discussion at length regarding combination of benzodiazepines and narcotics.  Has Narcan at home. This writer has discussed medication plan with Dr Alfredo, Dr Ryan in the past.     (G57.72) Complex regional pain syndrome type 2 of left lower extremity  Comment: Confirmed by work up at [facility].  Has some testing coming up at [facility] that pt describes as \"sweat testing.\"  Plan: oxyCODONE-acetaminophen (PERCOCET) 5-325 MG per        tablet, see above Chronic pain syndrome.         Discussed continued exercise, gentle stretches/    (C73) Malignant neoplasm of thyroid gland (H)  Comment: Thyroid cancer treated at [facility].  Follow up labs due in 8 weeks.   Plan: oxyCODONE-acetaminophen (PERCOCET) 5-325 MG per        tablet,         Would like to have follow up labs drawn at [facility] and sent to [facility].     (F41.9) Anxiety  Comment: Long history of anxiety and fear of pain being out of control  Plan: diazepam (VALIUM) 10 MG tablet        No increase in current dose.  Is taking 1/2 tab (5 mg) 4 times daily rather than 10 mg twice daily.     (Z79.891) Encounter for long-term use of opiate analgesic  Comment: Long history of constipation at time having an impact on ability to eat.   Plan: senna-docusate (SENEXON-S) 8.6-50 MG per tablet        Stressed vigorous bowel plan.  BM ideally every day if not every other day.     Remeron (mirtazapine)  High Risk Drug Monitoring? Yes  Name of Drug being monitored: Remeron  Reason for drug, and what is being monitored and why: Mood-neutral/ SI-none. Lipid and CBC.  Recent Labs   Lab Test 10/09/14   CHOL  192   HDL  39   LDL  126   TRIG  137   CHOLHDLRATIO  4.92     Lab Results   Component Value Date    " WBC 10.0 02/02/2017     Lab Results   Component Value Date    RBC 4.62 02/02/2017     Lab Results   Component Value Date    HGB 14.3 02/02/2017     Lab Results   Component Value Date    HCT 44.2 02/02/2017     No components found for: MCT  Lab Results   Component Value Date    MCV 96 02/02/2017     Lab Results   Component Value Date    MCH 31.0 02/02/2017     Lab Results   Component Value Date    MCHC 32.4 02/02/2017     Lab Results   Component Value Date    RDW 12.7 02/02/2017     Lab Results   Component Value Date     02/02/2017     Follow-up Plan: Continue current treatment plan    I spent Greater than 40 minutes was spent face to face with Alka Mcfadden, with greater than 50 % in counselling and consultation about Complex regional pain syndrome, sequelae of thyroid cancer, hip pain, constipation, anxiety, monitoring high risk medications.     The information in this document, created by the medical scribe, Socorro Lyon, for me, accurately reflects the services I personally performed and the decisions made by me. I have reviewed and approved this document for accuracy prior to leaving the patient care area.    BOBBY Butcher [facility] PRIMARY [facility]

## 2018-01-04 NOTE — NURSING NOTE
"Chief Complaint   Patient presents with     RECHECK       Initial /64  Pulse 72  Temp 97.6  F (36.4  C) (Oral)  Resp 16  Wt 112 lb 8 oz (51 kg)  SpO2 97%  BMI 18.72 kg/m2 Estimated body mass index is 18.72 kg/(m^2) as calculated from the following:    Height as of 1/5/17: 5' 5\" (1.651 m).    Weight as of this encounter: 112 lb 8 oz (51 kg).  Medication Reconciliation: complete     Oswaldo Santiago, ZULEIMA    "

## 2018-01-04 NOTE — PROGRESS NOTES
Clinic Care Coordination Contact  [facility]/Voicemail    Referral Source: PCP  Clinical Data: Care Coordinator Outreach  Outreach attempted x 1.  Left message on voicemail with call back information and requested return call.  Plan: Care Coordinator will do no further outreaches at this time.  SOLEDAD Santizo    Care Coordinator  [facility]   [phone]

## 2018-01-04 NOTE — MR AVS SNAPSHOT
After Visit Summary   1/4/2018    Alka Mcfadden    MRN: [id number]           Patient Information     Date Of Birth          1960        Visit Information        Provider Department      1/4/2018 10:30 AM Shanda Vo APRN CNP [facility] Primary [facility]        Today's Diagnoses     Chronic pain syndrome        Malignant neoplasm of thyroid gland (H)        Complex regional pain syndrome type 2 of left lower extremity        Anxiety        Encounter for long-term use of opiate analgesic           Follow-ups after your visit        Your next 10 appointments already scheduled     Jan 04, 2018  1:00 PM CST   US ABDOMEN COMPLETE with URUS1   [facility], Ultrasound ([facility])    [address] [facility] [address] [phone]           Please bring a list of your medicines (including vitamins, minerals and over-the-counter drugs). Also, tell your doctor about any allergies you may have. Wear comfortable clothes and leave your valuables at home.  Adults: No eating or drinking for 8 hours before the exam. You may take medicine with a small sip of water.  Children: - Children 6+ years: No food or drink for 6 hours before exam. - Children 1-5 years: No food or drink for 4 hours before exam. - Infants, breast-fed: may have breast milk up to 2 hours before exam. - Infants, formula: may have bottle until 4 hours before exam.  Please call the Imaging Department at your exam site with any questions.              Who to contact     If you have questions or need follow up information about today's clinic visit or your schedule please contact [facility] directly at [phone].  Normal or non-critical lab and imaging results will be communicated to you by MyChart, letter or phone within 4 business days after the clinic has received the results. If you do not hear from us within 7 days, please  "contact the clinic through Doubloon or phone. If you have a critical or abnormal lab result, we will notify you by phone as soon as possible.  Submit refill requests through Doubloon or call your pharmacy and they will forward the refill request to us. Please allow 3 business days for your refill to be completed.          Additional Information About Your Visit        FileboardharBand Metrics Information     Doubloon lets you send messages to your doctor, view your test results, renew your prescriptions, schedule appointments and more. To sign up, go to [email or web address] . Click on \"Log in\" on the left side of the screen, which will take you to the Welcome page. Then click on \"Sign up Now\" on the right side of the page.     You will be asked to enter the access code listed below, as well as some personal information. Please follow the directions to create your username and password.     Your access code is: [email or web address]  Expires: 2018 12:01 PM     Your access code will  in 90 days. If you need help or a new code, please call your [place] clinic or [phone].        Care EveryWhere ID     This is your Care EveryWhere ID. This could be used by other organizations to access your [place] medical records  [id number]        Your Vitals Were     Pulse Temperature Respirations Pulse Oximetry BMI (Body Mass Index)       72 97.6  F (36.4  C) (Oral) 16 97% 18.72 kg/m2        Blood Pressure from Last 3 Encounters:   18 102/64   10/16/17 122/76   17 128/72    Weight from Last 3 Encounters:   18 112 lb 8 oz (51 kg)   10/16/17 109 lb (49.4 kg)   17 106 lb 8 oz (48.3 kg)              Today, you had the following     No orders found for display         Today's Medication Changes          These changes are accurate as of: 18 12:01 PM.  If you have any questions, ask your nurse or doctor.               Stop taking these medicines if you haven't already. Please contact your care team if you have " questions.     HYDROcodone-acetaminophen 7.5-325 MG per tablet   Commonly known as:  NORCO   Stopped by:  Shanda Vo APRN CNP                Where to get your medicines      These medications were sent to [facility] [address] [place] [address] - [address] [facility]  [address] [facility], [facility] [address]     Phone:  [phone]     senna-docusate 8.6-50 MG per tablet         Some of these will need a paper prescription and others can be bought over the counter.  Ask your nurse if you have questions.     Bring a paper prescription for each of these medications     diazepam 10 MG tablet    oxyCODONE-acetaminophen 5-325 MG per tablet                Primary Care Provider Office Phone # Fax #    BOBBY Balbuena [phone]       [address] [facility] [[email or web address]] [address]  [facility] [address]        Equal Access to Services     PAULIE MARQUEZ : Hadii jerry chua hadasho Soomaali, waaxda luqadaha, qaybta kaalmada adeegyada, felipe rocha . So [facility] [phone].    ATENCIÓN: Si habla español, tiene a olmstead disposición servicios gratuitos de asistencia lingüística. Mark al [phone].    We comply with applicable federal civil rights laws and [place] laws. We do not discriminate on the basis of race, color, national origin, age, disability, sex, sexual orientation, or gender identity.            Thank you!     Thank you for choosing [facility]  for your care. Our goal is always to provide you with excellent care. Hearing back from our patients is one way we can continue to improve our services. Please take a few minutes to complete the written survey that you may receive in the mail after your visit with us. Thank you!             Your Updated Medication List - Protect others around you: Learn how to safely use, store and throw away your medicines at www.disposemymeds.org.          This list is accurate as of: 1/4/18 12:01 PM.  Always use your most recent med  list.                   Brand Name Dispense Instructions for use Diagnosis    ADVAIR DISKUS 250-50 MCG/DOSE diskus inhaler   Generic drug:  fluticasone-salmeterol      INHALE 1 PUFF PO Q 12 HOURS .TK ON A SCHEDULED BASIS FOR COPD    Chronic obstructive pulmonary disease with acute lower respiratory infection (H)       albuterol 108 (90 BASE) MCG/ACT Inhaler    PROAIR HFA/PROVENTIL HFA/VENTOLIN HFA    1 Inhaler    Inhale 2 puffs into the lungs every 4 hours as needed for shortness of breath / dyspnea or wheezing    Chronic obstructive pulmonary disease with acute lower respiratory infection (H)       diazepam 10 MG tablet   Start taking on:  1/11/2018    VALIUM    60 tablet    Take 0.5-1 tablets (5-10 mg) by mouth every 12 hours as needed for anxiety or sleep    Anxiety, Chronic pain syndrome       * MIRTAZAPINE PO      Take 15 mg by mouth At Bedtime        * mirtazapine 15 MG tablet    REMERON    30 tablet    TAKE 1 TABLET(15 MG) BY MOUTH AT BEDTIME    Recurrent major depressive disorder, in partial remission (H)       naloxone nasal spray    NARCAN    2 each    Spray 1 spray (4 mg) in nostril as needed for opioid reversal    Encounter for long-term use of opiate analgesic       omeprazole 40 MG capsule    priLOSEC    30 capsule    Take 1 capsule (40 mg) by mouth daily Take 30-60 minutes before a meal.    Haney's esophagus without dysplasia       ondansetron 4 MG tablet    ZOFRAN    40 tablet    Take 1 tablet (4 mg) by mouth every 8 hours as needed for nausea    Nausea       oxyCODONE-acetaminophen 5-325 MG per tablet    PERCOCET    96 tablet    Take 1-2 tablets by mouth every 4 hours as needed for pain maximum 8 tablet(s) per day    Chronic pain syndrome, Malignant neoplasm of thyroid gland (H), Complex regional pain syndrome type 2 of left lower extremity       senna-docusate 8.6-50 MG per tablet    SENEXON-S    120 tablet    Take 2 tablets by mouth 2 times daily as needed for constipation    Encounter for  long-term use of opiate analgesic       temazepam 30 MG capsule    RESTORIL    30 capsule    TAKE ONE CAPSULE (30MG) BY MOUTH NIGHTLY AS NEEDED FOR SLEEP    Insomnia, unspecified type, Chronic pain syndrome       * Notice:  This list has 2 medication(s) that are the same as other medications prescribed for you. Read the directions carefully, and ask your doctor or other care provider to review them with you.

## 2018-01-04 NOTE — MR AVS SNAPSHOT
"              After Visit Summary   1/4/2018    Alka Mcfadden    MRN: [id number]           Patient Information     Date Of Birth          1960        Visit Information        Provider Department      1/4/2018 10:30 AM Vaughn Eldridge, [facility]        Today's Diagnoses     Anxiety    -  1       Follow-ups after your visit        Your next 10 appointments already scheduled     Feb 21, 2018  3:00 PM CST   Return Visit with BOBBY Balbuena CNP   [facility]    [address]  [address]  [facility] [address] [phone]            Feb 21, 2018  3:00 PM CST   Return Visit with CHERY Bahena   [facility]    [address]  [address]  [facility] [id number] [phone]              Who to contact     If you have questions or need follow up information about today's clinic visit or your schedule please contact [facility] directly at [phone].  Normal or non-critical lab and imaging results will be communicated to you by Aptanahart, letter or phone within 4 business days after the clinic has received the results. If you do not hear from us within 7 days, please contact the clinic through Aptanahart or phone. If you have a critical or abnormal lab result, we will notify you by phone as soon as possible.  Submit refill requests through DirectPointe or call your pharmacy and they will forward the refill request to us. Please allow 3 business days for your refill to be completed.          Additional Information About Your Visit        Aptanahart Information     DirectPointe lets you send messages to your doctor, view your test results, renew your prescriptions, schedule appointments and more. To sign up, go to [email or web address] . Click on \"Log in\" on the left side of the screen, " "which will take you to the Welcome page. Then click on \"Sign up Now\" on the right side of the page.     You will be asked to enter the access code listed below, as well as some personal information. Please follow the directions to create your username and password.     Your access code is: [email or web address]  Expires: 2018 12:01 PM     Your access code will  in 90 days. If you need help or a new code, please call your [facility] or [phone].        Care EveryWhere ID     This is your Care EveryWhere ID. This could be used by other organizations to access your [place] medical records  [id number]         Blood Pressure from Last 3 Encounters:   18 102/64   10/16/17 122/76   17 128/72    Weight from Last 3 Encounters:   18 112 lb 8 oz (51 kg)   10/16/17 109 lb (49.4 kg)   17 106 lb 8 oz (48.3 kg)              Today, you had the following     No orders found for display         Today's Medication Changes          These changes are accurate as of: 18 11:59 PM.  If you have any questions, ask your nurse or doctor.               Start taking these medicines.        Dose/Directions    oxyCODONE-acetaminophen 5-325 MG per tablet   Commonly known as:  PERCOCET   Used for:  Chronic pain syndrome, Malignant neoplasm of thyroid gland (H), Complex regional pain syndrome type 2 of left lower extremity   Started by:  Shanda Vo APRN CNP        Dose:  1-2 tablet   Take 1-2 tablets by mouth every 4 hours as needed for pain maximum 8 tablet(s) per day   Quantity:  240 tablet   Refills:  0         Stop taking these medicines if you haven't already. Please contact your care team if you have questions.     HYDROcodone-acetaminophen 7.5-325 MG per tablet   Commonly known as:  NORCO   Stopped by:  Shanda Vo APRN CNP                Where to get your medicines      These medications were sent to [address] - [address] [facility]  [address] [facility] " [place] [address]     Phone:  [phone]     senna-docusate 8.6-50 MG per tablet         Some of these will need a paper prescription and others can be bought over the counter.  Ask your nurse if you have questions.     Bring a paper prescription for each of these medications     diazepam 10 MG tablet    oxyCODONE-acetaminophen 5-325 MG per tablet                Primary Care Provider Office Phone # Fax #    Shanda Vo, APRN [phone]       [address] [facility] [address]  [facility] [address]        Equal Access to Services     [facility]: Hadii aad ku hadasho Soomaali, waaxda luqadaha, qaybta kaalmada adeegyada, waxay shey haymartin rocha . So [facility] [place] [phone].    ATENCIÓN: Si habla español, tiene a olmstead disposición servicios gratuitos de asistencia lingüística. [facility] [phone].    We comply with applicable federal civil rights laws and [place] laws. We do not discriminate on the basis of race, color, national origin, age, disability, sex, sexual orientation, or gender identity.            Thank you!     Thank you for choosing [facility]  for your care. Our goal is always to provide you with excellent care. Hearing back from our patients is one way we can continue to improve our services. Please take a few minutes to complete the written survey that you may receive in the mail after your visit with us. Thank you!             Your Updated Medication List - Protect others around you: Learn how to safely use, store and throw away your medicines at www.disposemymeds.org.          This list is accurate as of: 1/4/18 11:59 PM.  Always use your most recent med list.                   Brand Name Dispense Instructions for use Diagnosis    ADVAIR DISKUS 250-50 MCG/DOSE diskus inhaler   Generic drug:  fluticasone-salmeterol      INHALE 1 PUFF PO Q 12 HOURS .TK ON A SCHEDULED BASIS FOR COPD    Chronic obstructive pulmonary disease with acute lower respiratory  infection (H)       albuterol 108 (90 BASE) MCG/ACT Inhaler    PROAIR HFA/PROVENTIL HFA/VENTOLIN HFA    1 Inhaler    Inhale 2 puffs into the lungs every 4 hours as needed for shortness of breath / dyspnea or wheezing    Chronic obstructive pulmonary disease with acute lower respiratory infection (H)       diazepam 10 MG tablet   Start taking on:  1/11/2018    VALIUM    60 tablet    Take 0.5-1 tablets (5-10 mg) by mouth every 12 hours as needed for anxiety or sleep    Anxiety, Chronic pain syndrome       * MIRTAZAPINE PO      Take 15 mg by mouth At Bedtime        * mirtazapine 15 MG tablet    REMERON    30 tablet    TAKE 1 TABLET(15 MG) BY MOUTH AT BEDTIME    Recurrent major depressive disorder, in partial remission (H)       naloxone nasal spray    NARCAN    2 each    Spray 1 spray (4 mg) in nostril as needed for opioid reversal    Encounter for long-term use of opiate analgesic       omeprazole 40 MG capsule    priLOSEC    30 capsule    Take 1 capsule (40 mg) by mouth daily Take 30-60 minutes before a meal.    Haney's esophagus without dysplasia       ondansetron 4 MG tablet    ZOFRAN    40 tablet    Take 1 tablet (4 mg) by mouth every 8 hours as needed for nausea    Nausea       oxyCODONE-acetaminophen 5-325 MG per tablet    PERCOCET    240 tablet    Take 1-2 tablets by mouth every 4 hours as needed for pain maximum 8 tablet(s) per day    Chronic pain syndrome, Malignant neoplasm of thyroid gland (H), Complex regional pain syndrome type 2 of left lower extremity       senna-docusate 8.6-50 MG per tablet    SENEXON-S    120 tablet    Take 2 tablets by mouth 2 times daily as needed for constipation    Encounter for long-term use of opiate analgesic       temazepam 30 MG capsule    RESTORIL    30 capsule    TAKE ONE CAPSULE (30MG) BY MOUTH NIGHTLY AS NEEDED FOR SLEEP    Insomnia, unspecified type, Chronic pain syndrome       * Notice:  This list has 2 medication(s) that are the same as other medications prescribed  for you. Read the directions carefully, and ask your doctor or other care provider to review them with you.

## 2018-01-05 NOTE — PROGRESS NOTES
[facility] - Integrated Primary Care   January 4, 2018      Behavioral Health Clinician Progress Note    Patient Name: Alka Mcfadden           Service Type: Individual      Service Location:   Face to Face in Clinic     Session Start Time: 11:20am  Session End Time: 12pm      Session Length: 38 - 52      Attendees: Patient and Spouse / Significant Other    Visit Activities (Refresh list every visit): [facility]    Diagnostic Assessment Date: not completed yet  Treatment Plan Review Date: not completed yet  See Flowsheets for today's PHQ-9 and JAMAL-7 results  Previous PHQ-9:   PHQ-9 SCORE 12/8/2015 7/15/2016 12/5/2016   Total Score - - -   Total Score - - -   Total Score 0 0 0     Previous JAMAL-7:   JAMAL-7 SCORE 12/8/2015 7/15/2016 12/5/2016   Total Score - - -   Total Score 0 0 0   Total Score - - -       KENDRA LEVEL:  No flowsheet data found.    DATA  Extended Session (60+ minutes): No  Interactive Complexity: No  Crisis: No    Treatment Objective(s) Addressed in This Session:  Target Behavior(s): disease management/lifestyle changes mental health    Depressed Mood: Increase interest, engagement, and pleasure in doing things  Decrease frequency and intensity of feeling down, depressed, hopeless  Identify negative self-talk and behaviors: challenge core beliefs, myths, and actions  Improve concentration, focus, and mindfulness in daily activities   Anxiety: will experience a reduction in anxiety, will develop more effective coping skills to manage anxiety symptoms, will develop healthy cognitive patterns and beliefs and will increase ability to function adaptively  Psychological distress related to Pain  Psychological distress related to Chronic Disease Management    Current Stressors / Issues:  The patient was seen by [facility] per the request of the PCP.  She reported that she had her surgery to remove part of her thyroid-since the surgery she has had improved mood as she stated to have stable mood-regarding sleeping  "the patient reported having better sleeping but that she continues to wake during the night-she reported having better appetite and that she has been eating more regularly-her  monitors her eating and pushes her to eat when he notices that she has not eaten yet-she has been making efforts to be active in an effort rehabilitate so that she and her  can move to [place]-the patient reported \"I'm feeling better, I feel like I'm getting back to myself\" and she reported she has gained weight-regarding anxiety the patient reported having panic attacks that are motivated by her pain spikes.        Progress on Treatment Objective(s) / Homework:  Minimal progress - ACTION (Actively working towards change); Intervened by reinforcing change plan / affirming steps taken    Motivational Interviewing    MI Intervention: Expressed Empathy/Understanding, Supported Autonomy, Collaboration, Evocation, Permission to raise concern or advise, Open-ended questions, Reflections: simple and complex, Rolled with resistance: Simple reflection, Complex reflection and Evoked patient agenda and Change talk (evoked)     Change Talk Expressed by the Patient: Desire to change Ability to change Reasons to change Need to change Committment to change Activation Taking steps    Provider Response to Change Talk: E - Evoked more info from patient about behavior change, A - Affirmed patient's thoughts, decisions, or attempts at behavior change, R - Reflected patient's change talk and S - Summarized patient's change talk statements      Care Plan review completed: No    Medication Review:  No changes to current psychiatric medication(s)    Medication Compliance:  NA    Changes in Health Issues:   Yes: Pain, Associated Psychological Distress  Chronic disease management, Associated Psychological Distress    Chemical Use Review:   Substance Use: Chemical use reviewed, no active concerns identified      Tobacco Use: No current tobacco use.  "     Assessment: Current Emotional / Mental Status (status of significant symptoms):  Risk status (Self / Other harm or suicidal ideation)  Patient denies a history of suicidal ideation, suicide attempts, self-injurious behavior, homicidal ideation, homicidal behavior and and other safety concerns  Patient denies current fears or concerns for personal safety.  Patient denies current or recent suicidal ideation or behaviors.  Patient denies current or recent homicidal ideation or behaviors.  Patient denies current or recent self injurious behavior or ideation.  Patient denies other safety concerns.  A safety and risk management plan has not been developed at this time, however patient was encouraged to call [place] [phone] should there be a change in any of these risk factors.    Appearance:   Appropriate   Eye Contact:   Good   Psychomotor Behavior: Normal   Attitude:   Cooperative   Orientation:   All  Speech   Rate / Production: Normal    Volume:  Normal   Mood:    Normal  Affect:    Appropriate   Thought Content:  Clear   Thought Form:  Coherent  Goal Directed  Logical   Insight:    Good     Diagnoses:  1. Anxiety        Collateral Reports Completed:  Not Applicable    Plan: (Homework, other):  Patient was given information about behavioral services and encouraged to schedule a follow up appointment with the clinic [facility] as needed.  She was also given information about mental health symptoms and treatment options .  CD Recommendations: No indications of CD issues.  Vaughn Eldridge, CHERY, [facility]      ______________________________________________________________________

## 2018-01-08 NOTE — PATIENT INSTRUCTIONS
Come back in Mid February for labs and reevaluation.     No more than 20 mg total of diazepam in 24 hours.     Keep moving, stretching, walking daily    Come back in 4-6 weeks.

## 2018-01-11 ENCOUNTER — TELEPHONE (OUTPATIENT)
Dept: FAMILY MEDICINE | Facility: CLINIC | Age: 58
End: 2018-01-11

## 2018-01-11 NOTE — TELEPHONE ENCOUNTER
Called patient and reviewed results of ultrasound. Patient states she is having pain and thinks it is related to her gallbladder. Writer offered her sooner appt to discuss. Pt declined. Asked if pain is more consistent or flares when she eats. Pt states it is hard to say. Writer encouraged patient to discuss this further with PCP at next OV and return call to clinic if pt feels it worsens.  Bhumi Sanchez RN

## 2018-01-11 NOTE — TELEPHONE ENCOUNTER
Reason for Call:  Request for results:    Name of test or procedure:  Ultrasound of gallbladder     Date of test of procedure: 1/4/18    Location of the test or procedure: [facility] to leave the result message on voice mail or with a family member? YES    Phone number Patient can be reached at:  Home number on file [phone] (home)    Additional comments: pt has appt tomorrow at the [facility] and would like to know the results.    Call taken on 1/11/2018 at 10:48 AM by Pawel De La Rosa

## 2018-01-24 DIAGNOSIS — G89.4 CHRONIC PAIN SYNDROME: ICD-10-CM

## 2018-01-24 DIAGNOSIS — G57.72 COMPLEX REGIONAL PAIN SYNDROME TYPE 2 OF LEFT LOWER EXTREMITY: ICD-10-CM

## 2018-01-24 DIAGNOSIS — C73 MALIGNANT NEOPLASM OF THYROID GLAND (H): ICD-10-CM

## 2018-01-24 NOTE — TELEPHONE ENCOUNTER
Last Written Prescription Date:  01/04/18  Last Fill Quantity: 240,  # refills: 0   Last Office Visit with [facility], P or [facility] prescribing provider:  01/04/18   Future Office Visit:    Next 5 appointments (look out 90 days)     Feb 21, 2018  3:00 PM CST   Return Visit with BOBBY Balbuena CNP   [facility] ([facility]    [address]  [address]  [facility] [address]   [phone]            Feb 21, 2018  3:00 PM CST   Return Visit with CHERY Bahena   [facility] ([facility])    [address]  [address]  [facility] [address]   [phone]                Pt is wondering if the prescription can be written before 02/01/18 because her  is going out of town on 02/01, and she won't have a ride on the date her prescription is due.     Deloris Leon

## 2018-01-24 NOTE — TELEPHONE ENCOUNTER
oxyCODONE-acetaminophen (PERCOCET) 5-325 MG per tablet  Controlled Substance Refill Request    Last refill: 1/4/18    Last clinic visit: 1/4/18     Next appt: 2/21/18    Controlled substance agreement on file: No.    Documentation in problem list reviewed:  Yes    Processing:  Patient will  in clinic    RX [place] ([place] reviewed:  reviewed- no concerns  [place]:  [email or web address]      Nickolas Dempsey RN

## 2018-01-29 RX ORDER — OXYCODONE AND ACETAMINOPHEN 5; 325 MG/1; MG/1
1-2 TABLET ORAL EVERY 4 HOURS PRN
Qty: 240 TABLET | Refills: 0 | Status: SHIPPED | OUTPATIENT
Start: 2018-01-29 | End: 2018-02-21

## 2018-01-30 ENCOUNTER — TELEPHONE (OUTPATIENT)
Dept: NURSING | Facility: CLINIC | Age: 58
End: 2018-01-30

## 2018-01-30 NOTE — TELEPHONE ENCOUNTER
Spoke with the patient and 1/29/18 percocet script will be placed at the  for . Afriac Galo RN January 30, 2018 10:11 AM

## 2018-02-21 ENCOUNTER — OFFICE VISIT (OUTPATIENT)
Dept: FAMILY MEDICINE | Facility: CLINIC | Age: 58
End: 2018-02-21
Payer: COMMERCIAL

## 2018-02-21 ENCOUNTER — OFFICE VISIT (OUTPATIENT)
Dept: BEHAVIORAL HEALTH | Facility: CLINIC | Age: 58
End: 2018-02-21
Payer: COMMERCIAL

## 2018-02-21 VITALS
SYSTOLIC BLOOD PRESSURE: 98 MMHG | HEART RATE: 78 BPM | DIASTOLIC BLOOD PRESSURE: 64 MMHG | TEMPERATURE: 97.9 F | OXYGEN SATURATION: 96 % | WEIGHT: 115 LBS | RESPIRATION RATE: 16 BRPM | BODY MASS INDEX: 19.14 KG/M2

## 2018-02-21 DIAGNOSIS — F33.41 RECURRENT MAJOR DEPRESSIVE DISORDER, IN PARTIAL REMISSION (H): ICD-10-CM

## 2018-02-21 DIAGNOSIS — F41.9 ANXIETY: Primary | ICD-10-CM

## 2018-02-21 DIAGNOSIS — F41.9 ANXIETY: ICD-10-CM

## 2018-02-21 DIAGNOSIS — G89.4 CHRONIC PAIN SYNDROME: ICD-10-CM

## 2018-02-21 DIAGNOSIS — G57.72 COMPLEX REGIONAL PAIN SYNDROME TYPE 2 OF LEFT LOWER EXTREMITY: ICD-10-CM

## 2018-02-21 DIAGNOSIS — C73 MALIGNANT NEOPLASM OF THYROID GLAND (H): ICD-10-CM

## 2018-02-21 PROCEDURE — 99215 OFFICE O/P EST HI 40 MIN: CPT | Performed by: NURSE PRACTITIONER

## 2018-02-21 PROCEDURE — 90832 PSYTX W PT 30 MINUTES: CPT | Performed by: SOCIAL WORKER

## 2018-02-21 RX ORDER — MIRTAZAPINE 30 MG/1
30 TABLET, FILM COATED ORAL AT BEDTIME
Qty: 30 TABLET | Refills: 11 | Status: SHIPPED | OUTPATIENT
Start: 2018-02-21

## 2018-02-21 RX ORDER — DIAZEPAM 10 MG
5-10 TABLET ORAL EVERY 12 HOURS PRN
Qty: 60 TABLET | Refills: 0 | Status: SHIPPED | OUTPATIENT
Start: 2018-02-21 | End: 2018-05-30

## 2018-02-21 RX ORDER — OXYCODONE AND ACETAMINOPHEN 5; 325 MG/1; MG/1
1-2 TABLET ORAL EVERY 4 HOURS PRN
Qty: 240 TABLET | Refills: 0 | Status: SHIPPED | OUTPATIENT
Start: 2018-02-21 | End: 2018-03-27

## 2018-02-21 NOTE — NURSING NOTE
"Chief Complaint   Patient presents with     Thyroid Problem     Pain       Initial BP 98/64 (BP Location: Left arm)  Pulse 78  Temp 97.9  F (36.6  C) (Oral)  Resp 16  Wt 115 lb (52.2 kg)  SpO2 96%  BMI 19.14 kg/m2 Estimated body mass index is 19.14 kg/(m^2) as calculated from the following:    Height as of 1/5/17: 5' 5\" (1.651 m).    Weight as of this encounter: 115 lb (52.2 kg).  Medication Reconciliation: complete     Steffany Winston CMA      "

## 2018-02-21 NOTE — PROGRESS NOTES
[facility]   February 21, 2018      Behavioral Health Clinician Progress Note    Patient Name: Alka Mcfadden           Service Type: Individual      Service Location:   Face to Face in Clinic     Session Start Time: 3:pm  Session End Time: 3:26pm      Session Length: 16 - 37      Attendees: Patient and Spouse / Significant Other    Visit Activities (Refresh list every visit): [facility] [employer]    Diagnostic Assessment Date: not completed yet  Treatment Plan Review Date: not completed yet  See Flowsheets for today's PHQ-9 and JAMAL-7 results  Previous PHQ-9:   PHQ-9 SCORE 12/8/2015 7/15/2016 12/5/2016   Total Score - - -   Total Score - - -   Total Score 0 0 0     Previous JAMAL-7:   JAMAL-7 SCORE 12/8/2015 7/15/2016 12/5/2016   Total Score - - -   Total Score 0 0 0   Total Score - - -       KENDRA LEVEL:  No flowsheet data found.    DATA  Extended Session (60+ minutes): No  Interactive Complexity: No  Crisis: No    Treatment Objective(s) Addressed in This Session:  Target Behavior(s): disease management/lifestyle changes mental health    Depressed Mood: Increase interest, engagement, and pleasure in doing things  Decrease frequency and intensity of feeling down, depressed, hopeless  Identify negative self-talk and behaviors: challenge core beliefs, myths, and actions  Improve concentration, focus, and mindfulness in daily activities   Anxiety: will experience a reduction in anxiety, will develop more effective coping skills to manage anxiety symptoms, will develop healthy cognitive patterns and beliefs and will increase ability to function adaptively  Psychological distress related to Pain  Psychological distress related to Chronic Disease Management    Current Stressors / Issues:  The patient was seen by [facility] per the request of the PCP.  She reported she had a recent procedure/assessment of health condition and she explained the procedure to the PCP during the visit-they had some discussion of  "health and medical treatment-additional treatment options to help manage her care-the patient needs to continue to give this thought about how she wants to move forward-she is working on being able to accept her current health circumstances-she recently lost an aunt due to health issues-and she has another family member that is going through some similar health issues as her aunt-feels that she is getting back to the \"old me\" since her thyroid surgery-we had discussion of how the patient can make the choice to look at experiences in ways that bring peace and comfort-she was excited about gaining some weight since last visit- over 40 years to her -regarding mood she reported that her mood is getting better-no worrying that is unmanageable-she has been able to take her medications in responsible way to address her pain-regarding mood she stated \"wax and wanes.\"        Progress on Treatment Objective(s) / Homework:  Minimal progress - ACTION (Actively working towards change); Intervened by reinforcing change plan / affirming steps taken    Motivational Interviewing    MI Intervention: Expressed Empathy/Understanding, Supported Autonomy, Collaboration, Evocation, Permission to raise concern or advise, Open-ended questions, Reflections: simple and complex, Rolled with resistance: Simple reflection, Complex reflection and Evoked patient agenda and Change talk (evoked)     Change Talk Expressed by the Patient: Desire to change Ability to change Reasons to change Need to change Committment to change Activation Taking steps    Provider Response to Change Talk: E - Evoked more info from patient about behavior change, A - Affirmed patient's thoughts, decisions, or attempts at behavior change, R - Reflected patient's change talk and S - Summarized patient's change talk statements      Care Plan review completed: No    Medication Review:  No changes to current psychiatric medication(s)    Medication " Compliance:  NA    Changes in Health Issues:   Yes: Pain, Associated Psychological Distress  Chronic disease management, Associated Psychological Distress    Chemical Use Review:   Substance Use: Chemical use reviewed, no active concerns identified      Tobacco Use: No current tobacco use.      Assessment: Current Emotional / Mental Status (status of significant symptoms):  Risk status (Self / Other harm or suicidal ideation)  Patient denies a history of suicidal ideation, suicide attempts, self-injurious behavior, homicidal ideation, homicidal behavior and and other safety concerns  Patient denies current fears or concerns for personal safety.  Patient denies current or recent suicidal ideation or behaviors.  Patient denies current or recent homicidal ideation or behaviors.  Patient denies current or recent self injurious behavior or ideation.  Patient denies other safety concerns.  A safety and risk management plan has not been developed at this time, however patient was encouraged to call [place] [phone] should there be a change in any of these risk factors.    Appearance:   Appropriate   Eye Contact:   Good   Psychomotor Behavior: Normal   Attitude:   Cooperative   Orientation:   All  Speech   Rate / Production: Normal    Volume:  Normal   Mood:    Normal  Affect:    Appropriate   Thought Content:  Clear   Thought Form:  Coherent  Goal Directed  Logical   Insight:    Good     Diagnoses:  1. Anxiety        Collateral Reports Completed:  Not Applicable    Plan: (Homework, other):  Patient was given information about behavioral services and encouraged to schedule a follow up appointment with the clinic [facility] as needed.  She was also given information about mental health symptoms and treatment options .  CD Recommendations: No indications of CD issues.  CHERY Benedict, [facility]      ______________________________________________________________________

## 2018-02-21 NOTE — PROGRESS NOTES
SUBJECTIVE:                                                    Alka Mcfadden is a 57 year old female who presents to clinic today with her , Rubio, for the following health issues:  [facility], [place]: Vaughn Eldridge    [facility], sweat test, RSD  She went to the [facility] to do a sweat test which was inconclusive. She said she was heated to 104 degrees and powdered with azurite stone. The neurologist told her she has regional complex pain syndrome 2. Does not know if the number is indicative of a scale or different kinds of RSD. Asked neurologist what treatments are offered for her leg and she told her brain surgery, spinal stimulator, and pain rehabilitation. Patient will think about these options. She has accepted her condition and is learning how to cope with it. Neurologist asked if she tried medical cannabis but they do not have a facility for it.     Rubio was upset at the neurologists because they had different opinions and did not read over Alka's chart before they made the trip down there. Patient said there were too many providers there, and she has a lot of information in her chart.     Chronic Pain Follow-Up  Does not take Valium every day. She only takes 1/4 of a tab at a time and it helps her leg pain. She uses it before her pain gets really bad. She takes 2 tablets of Percocet every 4 hours or sometimes 1 tablet every 4 hours. She takes it as needed and has not had problems with constipation. Not taking senna for constipation.     Doing PT exercises aggravates it, and  says that is why she has had problems the last couple days.    Type / Location of Pain: Arthritis, all over  Analgesia/pain control:       Recent changes:  same      Overall control: Inadequate pain control  Activity level/function:      Daily activities:  Can do most things most days, with some rest    Work:  not applicable  Adverse effects:  No  Adherance    Taking medication as directed?  Yes    Participating in other treatments: no    Risk Factors:    Sleep:  Fair    Mood/anxiety:  slightly worsened    Recent family or social stressors: Death- Aunt passed away    Other aggravating factors: exercises, walking, stepping on foot  PHQ-9 SCORE 2015 7/15/2016 2016   Total Score - - -   Total Score - - -   Total Score 0 0 0     JAMAL-7 SCORE 2015 7/15/2016 2016   Total Score - - -   Total Score 0 0 0   Total Score - - -     Encounter-Level CSA - 2015:          Controlled Substance Agreement - Scan on 2015 12:55 PM : Controlled Substance Agreement 04/28/15 (below)            Encounter-Level CSA - 2015:          Controlled Substance Agreement - Scan on 2015  9:22 AM : Controlled Medication Agreement 01/05/15 (below)              Mental Health Follow up: Depression  Status since last visit: improved    See PHQ-9 for current symptoms.  Other associated symptoms: None    Complicating factors: aunt's death,   Significant life event:  No   Current substance abuse:  None  Anxiety or Panic symptoms:  No    She worries but not excessively. Her depression waxes and wanes but is improving. She is reading again and thinking about jewelry making. Her  says she is buying jewelry on TV with the money from her aunt.     PHQ-9  English PHQ-9   Any Language           COPD  She was diagnosed with COPD when she went to the [facility] for pneumonia. Does not need inhaler. She did a breathing test within 6 years ago and thinks it was done at [facility].  says she had coughing with mucus prior to be diagnosed with thyroid. Patient does not think her breathing is a big issue. She was told kyphosis also affects her breathing.     Family, death of aunt  Last week, her aunt  suddenly of a heart condition and she was very dear to her. Another family member is going through the same thing. Patient is glad she did not have to suffer.     Thyroid  Regarding her thyroid, she feels like she is becoming herself more every day. The  surgeon wants her to get her thyroid rechecked in 2 months. Not on levothyroxine right now. Increasing mirtazapine to 30mg. Refused to be on gabapentin because it makes her depressed.     Appetite, weight   She has been eating a lot of donuts and fettuccine sathish. She says this is a normal weight for her.      Wt Readings from Last 5 Encounters:   02/21/18 52.2 kg (115 lb)   01/04/18 51 kg (112 lb 8 oz)   10/16/17 49.4 kg (109 lb)   09/18/17 48.3 kg (106 lb 8 oz)   06/13/17 44.5 kg (98 lb)     Additional notes  - She was told she had AIDS in 1985 because her results were a false positive which created a lot of problems for her family for a few months.  - Plans to go to [place] for their daughter's spring break and wonders how that affects her prescriptions      Problems taking medications regularly: No    Medication side effects: none    Diet: regular (no restrictions)    Social History   Substance Use Topics     Smoking status: Light Tobacco Smoker     Types: Cigarettes     Smokeless tobacco: Never Used      Comment: 3-4 cigarettes per week.     Alcohol use No        Problem list and histories reviewed & adjusted, as indicated.  Additional history: as documented    Patient Active Problem List   Diagnosis     Encounter for long-term use of opiate analgesic     Hyperalgesia      ### Cataract, OU     Dry eyes     Hyperlipidemia LDL goal <130     Constipation     Insomnia     Tobacco use disorder     Anxiety     Chronic pain syndrome     Suicidal ideation     Nausea     Pancreatic atrophy     Haney's esophagus determined by biopsy     Chronic obstructive pulmonary disease (H) dx at [place]     Malignant neoplasm of thyroid gland (H)     Complex regional pain syndrome type 2 of left lower extremity     Ankylosing spondylitis of multiple sites in spine (H)     Calculus of gallbladder without cholecystitis     Past Surgical History:   Procedure Laterality Date     ARTHROPLASTY REVISION HIP  2005, 2015     BACK SURGERY   1985/1990/1996     ENDOSCOPIC ULTRASOUND UPPER GASTROINTESTINAL TRACT (GI) N/A 1/5/2017    Procedure: ENDOSCOPIC ULTRASOUND, ESOPHAGOSCOPY / UPPER GASTROINTESTINAL TRACT (GI);  Surgeon: Esteban Mortensen MD;  Location: [facility] OR     ESOPHAGOSCOPY, GASTROSCOPY, DUODENOSCOPY (EGD), COMBINED N/A 1/5/2017    Procedure: COMBINED ESOPHAGOSCOPY, GASTROSCOPY, DUODENOSCOPY (EGD);  Surgeon: Esteban Mortensen MD;  Location: [facility] OR     JOINT REPLACEMENT  1985       Social History   Substance Use Topics     Smoking status: Light Tobacco Smoker     Types: Cigarettes     Smokeless tobacco: Never Used      Comment: 3-4 cigarettes per week.     Alcohol use No     No family history on file.        Current Outpatient Prescriptions   Medication Sig Dispense Refill     oxyCODONE-acetaminophen (PERCOCET) 5-325 MG per tablet Take 1-2 tablets by mouth every 4 hours as needed for pain maximum 8 tablet(s) per day 240 tablet 0     diazepam (VALIUM) 10 MG tablet Take 0.5-1 tablets (5-10 mg) by mouth every 12 hours as needed for anxiety or sleep 60 tablet 0     senna-docusate (SENEXON-S) 8.6-50 MG per tablet Take 2 tablets by mouth 2 times daily as needed for constipation 120 tablet 0     temazepam (RESTORIL) 30 MG capsule TAKE ONE CAPSULE (30MG) BY MOUTH NIGHTLY AS NEEDED FOR SLEEP 30 capsule 0     mirtazapine (REMERON) 15 MG tablet TAKE 1 TABLET(15 MG) BY MOUTH AT BEDTIME 30 tablet 0     naloxone (NARCAN) nasal spray Spray 1 spray (4 mg) in nostril as needed for opioid reversal 2 each 1     MIRTAZAPINE PO Take 15 mg by mouth At Bedtime       ondansetron (ZOFRAN) 4 MG tablet Take 1 tablet (4 mg) by mouth every 8 hours as needed for nausea 40 tablet 0     albuterol (PROAIR HFA/PROVENTIL HFA/VENTOLIN HFA) 108 (90 BASE) MCG/ACT Inhaler Inhale 2 puffs into the lungs every 4 hours as needed for shortness of breath / dyspnea or wheezing 1 Inhaler 3     ADVAIR DISKUS 250-50 MCG/DOSE diskus inhaler INHALE 1 PUFF PO Q 12 HOURS .TK ON A SCHEDULED  BASIS FOR COPD  0     omeprazole (PRILOSEC) 40 MG capsule Take 1 capsule (40 mg) by mouth daily Take 30-60 minutes before a meal. 30 capsule 1     Allergies   Allergen Reactions     Amoxicillin-Pot Clavulanate Rash     Augmentin Rash     Amitriptyline      Had reaction while on this and vistaril; was just not herself.     Hydroxyzine Other (See Comments)     Had reaction when took this along with amitriptyline.     Vistaril      Had reaction when took this along with amitriptyline.     Recent Labs   Lab Test  02/02/17   1657  01/05/17   0904   12/01/16   1349  12/01/16   0937  10/09/14   LDL   --    --    --    --    --    --   126   HDL   --    --    --    --    --    --   39   TRIG   --    --    --    --    --    --   137   ALT  30  16   --    --   15   < >   --    CR  0.75  0.64   < >   --   0.68   < >   --    GFRESTIMATED  79  >90  Non  GFR Calc     < >   --   89   < >   --    GFRESTBLACK  >90   GFR Calc    >90   GFR Calc     < >   --   >90   GFR Calc     < >   --    POTASSIUM  4.4  4.2   < >   --   3.7   < >   --    TSH  0.70   --    --   0.34*   --    < >   --     < > = values in this interval not displayed.      BP Readings from Last 3 Encounters:   02/21/18 98/64   01/04/18 102/64   10/16/17 122/76    Wt Readings from Last 3 Encounters:   02/21/18 52.2 kg (115 lb)   01/04/18 51 kg (112 lb 8 oz)   10/16/17 49.4 kg (109 lb)        Labs reviewed in EPIC  Problem list, Medication list, Allergies, and Medical/Social/Surgical histories reviewed in [facility] and updated as appropriate.     ROS: Constitutional, neuro, ENT, endocrine, pulmonary, cardiac, gastrointestinal, genitourinary, musculoskeletal, integument and psychiatric systems are negative, except as otherwise noted above in the HPI.    This document serves as a record of the services and decisions personally performed and made by Shanda Vo CNP. It was created on his/her behalf by Harrison Mcgowan  trained medical scribe. The creation of this document is based the provider's statements to the medical scribes.    Gustavo Terry Juan M 3:00 PM, February 21, 2018     OBJECTIVE:                                                    BP 98/64 (BP Location: Left arm)  Pulse 78  Temp 97.9  F (36.6  C) (Oral)  Resp 16  Wt 52.2 kg (115 lb)  SpO2 96%  BMI 19.14 kg/m2  Body mass index is 19.14 kg/(m^2).  GENERAL: thin, alert, well nourished, well hydrated  EYES/ENT: PERRL, no cervical, pre or post auricular nodes or sinus tenderness  Cardiovascular: negative findings: regular rate and rhythm, S1 normal, S2 normal, no murmur   Respiratory :respiratory rate and rhythm regular, lungs clear to auscultation, no rales, or rhonchi auscultated.   Abdomen: Abdomen soft, non-tender. BS normal. No masses, organomegaly  NEURO: Gait stiff, favoring left hip and leg.  Sensation grossly WNL on the right, Left with profound numbness, wearing AFO  SKIN: lesion from thyroid surgery well healed,  no suspicious lesions or rashes   Mental Status Assessment:  Appearance:   Appropriate   Eye Contact:   Good   Psychomotor Behavior: Normal   Attitude:   Cooperative   Orientation:   All  Speech   Rate / Production: Normal    Volume:  Normal   Mood:    Normal  Affect:    Appropriate   Thought Content:  Clear   Thought Form:  Coherent  Logical   Insight:    Good   Attention Span and Concentration:  good  Recent and Remote Memory:  intact  Fund of Knowledge: appropriate  Muscle Strength and Tone: normal   Suicidal Ideation: reports no thoughts, no intention  Hallucination: No  Paranoid-No  Manic-No  Panic-No  Self harm-No     ASSESSMENT/PLAN:                                                    (C73) Malignant neoplasm of thyroid gland (H)  Comment: Has concluded her work with the [facility], plans follow up in one year.   Plan: oxyCODONE-acetaminophen (PERCOCET) 5-325 MG per        tablet        Follow up with Thyroid labs in April 2018.      (G57.72)  Complex regional pain syndrome type 2 of left lower extremity  Comment: Attended evaluation of LE pain at [facility].  Pain Rehabilitation Program was recommended, 3 week intensive pain program in [place]  Plan: oxyCODONE-acetaminophen (PERCOCET) 5-325 MG per        tablet        Declining to attend [facility] at this time.  Will consider at future time.      (G89.4) Chronic pain syndrome  Comment: Continues to depend on both oxycodone and diazepam for symptom management.   Plan: oxyCODONE-acetaminophen (PERCOCET) 5-325 MG per        tablet, diazepam (VALIUM) 10 MG tablet        Discussion again regarding combination of oxycodone and benzodiazepines.  Encouraged her to consider decreasing use of diazepam and ultimately to eliminate it's use.  Will discuss further at next clinic visit.     (F41.9) Anxiety  Comment: Improved with regular dosing of Mirtazapine.   Plan: diazepam (VALIUM) 10 MG tablet        Encouraged to consider discontinuing use of diazepam.  To taper off by going down to 0.5 mg twice daily for 1-2 weeks then 0.5 mg daily for 1-2 weeks then stop.     (F33.41) Recurrent major depressive disorder, in partial remission (H)  Comment: improvement in depressive symptoms with addition of remeron in addition to improved weight.   Plan: mirtazapine (REMERON) 30 MG tablet        Increase dose of Mirtazapine to 30 mg at Bedtime.  Follow up in 4 weeks.         Remeron (mirtazapine)  High Risk Drug Monitoring? Yes  Name of Drug being monitored: Remeron  Reason for drug, and what is being monitored and why: Mood improving/ SI-none. Lipid and CBC.  Recent Labs   Lab Test 10/09/14   CHOL  192   HDL  39   LDL  126   TRIG  137   CHOLHDLRATIO  4.92     Lab Results   Component Value Date    WBC 10.0 02/02/2017     Lab Results   Component Value Date    RBC 4.62 02/02/2017     Lab Results   Component Value Date    HGB 14.3 02/02/2017     Lab Results   Component Value Date    HCT 44.2 02/02/2017     No components found  for: MCT  Lab Results   Component Value Date    MCV 96 02/02/2017     Lab Results   Component Value Date    MCH 31.0 02/02/2017     Lab Results   Component Value Date    MCHC 32.4 02/02/2017     Lab Results   Component Value Date    RDW 12.7 02/02/2017     Lab Results   Component Value Date     02/02/2017     Follow-up Plan: The current medical regimen is effective;  continue present plan and medications.      Patient Instructions:  We are increasing the Mirtazapine dose to 30 mg.     Let us know what you have learned about having prescriptions filled in [place].    Encouraged to consider discontinuing use of diazepam.  To taper off by going down to 0.5 mg twice daily for 1-2 weeks then 0.5 mg daily for 1-2 weeks then stop.     I spent Greater than 40 minutes was spent face to face with Alka Mcfadden, with greater than 50 % in counselling and consultation about Thyroid cancer, Depression, anxiety, chronic regional pain syndrome, management of high risk medications.     The information in this document, created by a scribe for me, accurately reflects the services I personally performed and the decisions made by me. I have reviewed and approved this document for accuracy.     BOBBY Butcher [facility]

## 2018-02-21 NOTE — MR AVS SNAPSHOT
"              After Visit Summary   2/21/2018    Alka Mcfadden    MRN: [id number]           Patient Information     Date Of Birth          1960        Visit Information        Provider Department      2/21/2018 3:00 PM Shanda Vo APRN CNP [facility]        Today's Diagnoses     Need for hepatitis C screening test        Screening for diabetic retinopathy        Need for prophylactic vaccination and inoculation against influenza        Chronic pain syndrome        Malignant neoplasm of thyroid gland (H)        Complex regional pain syndrome type 2 of left lower extremity        Anxiety        Recurrent major depressive disorder, in partial remission (H)          Care Instructions    We are increasing the Mirtazapine dose to 30 mg.     Let us know what you have learned about having prescriptions filled in [place].          Follow-ups after your visit        Who to contact     If you have questions or need follow up information about today's clinic visit or your schedule please contact [facility] directly at [phone].  Normal or non-critical lab and imaging results will be communicated to you by MyChart, letter or phone within 4 business days after the clinic has received the results. If you do not hear from us within 7 days, please contact the clinic through [employer] or phone. If you have a critical or abnormal lab result, we will notify you by phone as soon as possible.  Submit refill requests through Xipin or call your pharmacy and they will forward the refill request to us. Please allow 3 business days for your refill to be completed.          Additional Information About Your Visit        MyChart Information     Xipin lets you send messages to your doctor, view your test results, renew your prescriptions, schedule appointments and more. To sign up, go to [email or web address] . Click on \"Log in\" on the left side of the screen, " "which will take you to the Welcome page. Then click on \"Sign up Now\" on the right side of the page.     You will be asked to enter the access code listed below, as well as some personal information. Please follow the directions to create your username and password.     Your access code is: [email or web address]  Expires: 2018 12:01 PM     Your access code will  in 90 days. If you need help or a new code, please call your [facility] or [phone].        Care EveryWhere ID     This is your Care EveryWhere ID. This could be used by other organizations to access your [place] medical records  [id number]        Your Vitals Were     Pulse Temperature Respirations Pulse Oximetry BMI (Body Mass Index)       78 97.9  F (36.6  C) (Oral) 16 96% 19.14 kg/m2        Blood Pressure from Last 3 Encounters:   18 98/64   18 102/64   10/16/17 122/76    Weight from Last 3 Encounters:   18 115 lb (52.2 kg)   18 112 lb 8 oz (51 kg)   10/16/17 109 lb (49.4 kg)              Today, you had the following     No orders found for display         Today's Medication Changes          These changes are accurate as of 18  3:32 PM.  If you have any questions, ask your nurse or doctor.               These medicines have changed or have updated prescriptions.        Dose/Directions    mirtazapine 30 MG tablet   Commonly known as:  REMERON   This may have changed:  See the new instructions.   Used for:  Recurrent major depressive disorder, in partial remission (H)   Changed by:  Shanda Vo APRN CNP        Dose:  30 mg   Take 1 tablet (30 mg) by mouth At Bedtime   Quantity:  30 tablet   Refills:  11            Where to get your medicines      These medications were sent to [facility] [address] [place] [address] [address] [facility]  [address] [facility] [address]     Phone:  [phone]     mirtazapine 30 MG tablet         Some of these will need a paper prescription and others can be bought over the " counter.  Ask your nurse if you have questions.     Bring a paper prescription for each of these medications     diazepam 10 MG tablet    oxyCODONE-acetaminophen 5-325 MG per tablet                Primary Care Provider Office Phone # Fax #    BOBBY Balbuena [phone]       [address] [facility] [address]  [facility] [place] [address]        Equal Access to Services     [facility]: Hadii aad ku hadasho Soomaali, waaxda luqadaha, qaybta kaalmada adeegyada, waxay idiin hayaan adeeg kharash lacaryl . So [facility] [phone].    ATENCIÓN: Si habla español, tiene a olmstead disposición servicios gratuitos de asistencia lingüística. Llame al [phone].    We comply with applicable federal civil rights laws and [place] laws. We do not discriminate on the basis of race, color, national origin, age, disability, sex, sexual orientation, or gender identity.            Thank you!     Thank you for choosing [facility]  for your care. Our goal is always to provide you with excellent care. Hearing back from our patients is one way we can continue to improve our services. Please take a few minutes to complete the written survey that you may receive in the mail after your visit with us. Thank you!             Your Updated Medication List - Protect others around you: Learn how to safely use, store and throw away your medicines at www.disposemymeds.org.          This list is accurate as of 2/21/18  3:32 PM.  Always use your most recent med list.                   Brand Name Dispense Instructions for use Diagnosis    ADVAIR DISKUS 250-50 MCG/DOSE diskus inhaler   Generic drug:  fluticasone-salmeterol      INHALE 1 PUFF PO Q 12 HOURS .TK ON A SCHEDULED BASIS FOR COPD    Chronic obstructive pulmonary disease with acute lower respiratory infection (H)       albuterol 108 (90 BASE) MCG/ACT Inhaler    PROAIR HFA/PROVENTIL HFA/VENTOLIN HFA    1 Inhaler    Inhale 2 puffs into the lungs every 4 hours  as needed for shortness of breath / dyspnea or wheezing    Chronic obstructive pulmonary disease with acute lower respiratory infection (H)       diazepam 10 MG tablet    VALIUM    60 tablet    Take 0.5-1 tablets (5-10 mg) by mouth every 12 hours as needed for anxiety or sleep    Anxiety, Chronic pain syndrome       mirtazapine 30 MG tablet    REMERON    30 tablet    Take 1 tablet (30 mg) by mouth At Bedtime    Recurrent major depressive disorder, in partial remission (H)       naloxone nasal spray    NARCAN    2 each    Spray 1 spray (4 mg) in nostril as needed for opioid reversal    Encounter for long-term use of opiate analgesic       omeprazole 40 MG capsule    priLOSEC    30 capsule    Take 1 capsule (40 mg) by mouth daily Take 30-60 minutes before a meal.    Haney's esophagus without dysplasia       ondansetron 4 MG tablet    ZOFRAN    40 tablet    Take 1 tablet (4 mg) by mouth every 8 hours as needed for nausea    Nausea       oxyCODONE-acetaminophen 5-325 MG per tablet    PERCOCET    240 tablet    Take 1-2 tablets by mouth every 4 hours as needed for pain maximum 8 tablet(s) per day    Chronic pain syndrome, Malignant neoplasm of thyroid gland (H), Complex regional pain syndrome type 2 of left lower extremity       senna-docusate 8.6-50 MG per tablet    SENEXON-S    120 tablet    Take 2 tablets by mouth 2 times daily as needed for constipation    Encounter for long-term use of opiate analgesic       temazepam 30 MG capsule    RESTORIL    30 capsule    TAKE ONE CAPSULE (30MG) BY MOUTH NIGHTLY AS NEEDED FOR SLEEP    Insomnia, unspecified type, Chronic pain syndrome

## 2018-02-21 NOTE — PATIENT INSTRUCTIONS
We are increasing the Mirtazapine dose to 30 mg.     Let us know what you have learned about having prescriptions filled in [place].      Encouraged to consider discontinuing use of diazepam.  To taper off by going down to 0.5 mg twice daily for 1-2 weeks then 0.5 mg daily for 1-2 weeks then stop.

## 2018-04-12 ENCOUNTER — OFFICE VISIT (OUTPATIENT)
Dept: FAMILY MEDICINE | Facility: CLINIC | Age: 58
End: 2018-04-12
Payer: COMMERCIAL

## 2018-04-12 ENCOUNTER — OFFICE VISIT (OUTPATIENT)
Dept: BEHAVIORAL HEALTH | Facility: CLINIC | Age: 58
End: 2018-04-12
Payer: COMMERCIAL

## 2018-04-12 VITALS
HEART RATE: 75 BPM | WEIGHT: 118 LBS | TEMPERATURE: 98.2 F | DIASTOLIC BLOOD PRESSURE: 64 MMHG | OXYGEN SATURATION: 95 % | RESPIRATION RATE: 18 BRPM | SYSTOLIC BLOOD PRESSURE: 102 MMHG | BODY MASS INDEX: 19.64 KG/M2

## 2018-04-12 DIAGNOSIS — G89.4 CHRONIC PAIN SYNDROME: ICD-10-CM

## 2018-04-12 DIAGNOSIS — Z79.891 ENCOUNTER FOR LONG-TERM USE OF OPIATE ANALGESIC: ICD-10-CM

## 2018-04-12 DIAGNOSIS — K80.20 CALCULUS OF GALLBLADDER WITHOUT CHOLECYSTITIS WITHOUT OBSTRUCTION: ICD-10-CM

## 2018-04-12 DIAGNOSIS — Z11.59 ENCOUNTER FOR HCV SCREENING TEST FOR LOW RISK PATIENT: ICD-10-CM

## 2018-04-12 DIAGNOSIS — K59.03 DRUG-INDUCED CONSTIPATION: ICD-10-CM

## 2018-04-12 DIAGNOSIS — F41.9 ANXIETY: ICD-10-CM

## 2018-04-12 DIAGNOSIS — Z53.9 ERRONEOUS ENCOUNTER--DISREGARD: Primary | ICD-10-CM

## 2018-04-12 DIAGNOSIS — G57.72 COMPLEX REGIONAL PAIN SYNDROME TYPE 2 OF LEFT LOWER EXTREMITY: ICD-10-CM

## 2018-04-12 DIAGNOSIS — L98.9 SKIN LESION: ICD-10-CM

## 2018-04-12 DIAGNOSIS — C73 MALIGNANT NEOPLASM OF THYROID GLAND (H): Primary | ICD-10-CM

## 2018-04-12 LAB
ERYTHROCYTE [DISTWIDTH] IN BLOOD BY AUTOMATED COUNT: 13 % (ref 10–15)
HCT VFR BLD AUTO: 39.6 % (ref 35–47)
HGB BLD-MCNC: 12.6 G/DL (ref 11.7–15.7)
MCH RBC QN AUTO: 30.1 PG (ref 26.5–33)
MCHC RBC AUTO-ENTMCNC: 31.8 G/DL (ref 31.5–36.5)
MCV RBC AUTO: 95 FL (ref 78–100)
PLATELET # BLD AUTO: 382 10E9/L (ref 150–450)
RBC # BLD AUTO: 4.19 10E12/L (ref 3.8–5.2)
T3FREE SERPL-MCNC: 2.7 PG/ML (ref 2.3–4.2)
WBC # BLD AUTO: 7.3 10E9/L (ref 4–11)

## 2018-04-12 PROCEDURE — 99215 OFFICE O/P EST HI 40 MIN: CPT | Performed by: NURSE PRACTITIONER

## 2018-04-12 PROCEDURE — 84443 ASSAY THYROID STIM HORMONE: CPT | Performed by: NURSE PRACTITIONER

## 2018-04-12 PROCEDURE — 85027 COMPLETE CBC AUTOMATED: CPT | Performed by: NURSE PRACTITIONER

## 2018-04-12 PROCEDURE — 84481 FREE ASSAY (FT-3): CPT | Performed by: NURSE PRACTITIONER

## 2018-04-12 PROCEDURE — 86800 THYROGLOBULIN ANTIBODY: CPT | Performed by: NURSE PRACTITIONER

## 2018-04-12 PROCEDURE — 84439 ASSAY OF FREE THYROXINE: CPT | Performed by: NURSE PRACTITIONER

## 2018-04-12 PROCEDURE — 36415 COLL VENOUS BLD VENIPUNCTURE: CPT | Performed by: NURSE PRACTITIONER

## 2018-04-12 NOTE — MR AVS SNAPSHOT
"              After Visit Summary   4/12/2018    Alka Mcfadden    MRN: [id number]           Patient Information     Date Of Birth          1960        Visit Information        Provider Department      4/12/2018 11:00 AM [facility]        Today's Diagnoses     ERRONEOUS ENCOUNTER--DISREGARD    -  1       Follow-ups after your visit        Future tests that were ordered for you today     Open Future Orders        Priority Expected Expires Ordered    **Hepatitis C Screen Reflex to RNA FUTURE anytime Routine 4/12/2018 4/12/2019 4/12/2018            Who to contact     If you have questions or need follow up information about today's clinic visit or your schedule please contact [facility] directly at [phone].  Normal or non-critical lab and imaging results will be communicated to you by MyChart, letter or phone within 4 business days after the clinic has received the results. If you do not hear from us within 7 days, please contact the clinic through [employer] or phone. If you have a critical or abnormal lab result, we will notify you by phone as soon as possible.  Submit refill requests through Xiam or call your pharmacy and they will forward the refill request to us. Please allow 3 business days for your refill to be completed.          Additional Information About Your Visit        MyChart Information     Xiam lets you send messages to your doctor, view your test results, renew your prescriptions, schedule appointments and more. To sign up, go to [email or web address] . Click on \"Log in\" on the left side of the screen, which will take you to the Welcome page. Then click on \"Sign up Now\" on the right side of the page.     You will be asked to enter the access code listed below, as well as some personal information. Please follow the directions to create your username and password.     Your access code is: " [email or web address]  Expires: 2018 11:33 AM     Your access code will  in 90 days. If you need help or a new code, please call your [place] clinic or [phone].        Care EveryWhere ID     This is your Care EveryWhere ID. This could be used by other organizations to access your [place] medical records  [id number]         Blood Pressure from Last 3 Encounters:   18 102/64   18 98/64   18 102/64    Weight from Last 3 Encounters:   18 118 lb (53.5 kg)   18 115 lb (52.2 kg)   18 112 lb 8 oz (51 kg)              Today, you had the following     No orders found for display       Primary Care Provider Office Phone # Fax #    BOBBY Balbuena [phone]       [address] [facility] [address]  [facility] [address]        Equal Access to Services     [facility] : Hadii aad ku hadasho Soomaali, waaxda luqadaha, qaybta kaalmada adeegyada, waxay idiin hayaan adeeg kharash la'martin . So [facility] [phone].    ATENCIÓN: Si habla español, tiene a olmstead disposición servicios gratuitos de asistencia lingüística. Llame al [phone].    We comply with applicable federal civil rights laws and [place] laws. We do not discriminate on the basis of race, color, national origin, age, disability, sex, sexual orientation, or gender identity.            Thank you!     Thank you for choosing [facility]  for your care. Our goal is always to provide you with excellent care. Hearing back from our patients is one way we can continue to improve our services. Please take a few minutes to complete the written survey that you may receive in the mail after your visit with us. Thank you!             Your Updated Medication List - Protect others around you: Learn how to safely use, store and throw away your medicines at www.disposemymeds.org.          This list is accurate as of 18  2:18 PM.  Always use your most recent med list.                   Brand  Name Dispense Instructions for use Diagnosis    ADVAIR DISKUS 250-50 MCG/DOSE diskus inhaler   Generic drug:  fluticasone-salmeterol      INHALE 1 PUFF PO Q 12 HOURS .TK ON A SCHEDULED BASIS FOR COPD    Chronic obstructive pulmonary disease with acute lower respiratory infection (H)       albuterol 108 (90 Base) MCG/ACT Inhaler    PROAIR HFA/PROVENTIL HFA/VENTOLIN HFA    1 Inhaler    Inhale 2 puffs into the lungs every 4 hours as needed for shortness of breath / dyspnea or wheezing    Chronic obstructive pulmonary disease with acute lower respiratory infection (H)       diazepam 10 MG tablet    VALIUM    60 tablet    Take 0.5-1 tablets (5-10 mg) by mouth every 12 hours as needed for anxiety or sleep    Anxiety, Chronic pain syndrome       mirtazapine 30 MG tablet    REMERON    30 tablet    Take 1 tablet (30 mg) by mouth At Bedtime    Recurrent major depressive disorder, in partial remission (H)       naloxone nasal spray    NARCAN    2 each    Spray 1 spray (4 mg) in nostril as needed for opioid reversal    Encounter for long-term use of opiate analgesic       omeprazole 40 MG capsule    priLOSEC    30 capsule    Take 1 capsule (40 mg) by mouth daily Take 30-60 minutes before a meal.    Haney's esophagus without dysplasia       ondansetron 4 MG tablet    ZOFRAN    40 tablet    Take 1 tablet (4 mg) by mouth every 8 hours as needed for nausea    Nausea       oxyCODONE-acetaminophen 5-325 MG per tablet    PERCOCET    240 tablet    Take 1-2 tablets by mouth every 4 hours as needed for pain maximum 8 tablet(s) per day    Chronic pain syndrome, Malignant neoplasm of thyroid gland (H), Complex regional pain syndrome type 2 of left lower extremity       senna-docusate 8.6-50 MG per tablet    SENEXON-S    120 tablet    Take 2 tablets by mouth 2 times daily as needed for constipation    Encounter for long-term use of opiate analgesic       temazepam 30 MG capsule    RESTORIL    30 capsule    TAKE ONE CAPSULE (30MG) BY MOUTH  NIGHTLY AS NEEDED FOR SLEEP    Insomnia, unspecified type, Chronic pain syndrome

## 2018-04-12 NOTE — PATIENT INSTRUCTIONS
The key with the Miralax is to take the same amount each day, could be 1 teaspoon, up to 3 tablespoons.

## 2018-04-12 NOTE — NURSING NOTE
"No chief complaint on file.      Initial /64  Pulse 75  Temp 98.2  F (36.8  C) (Oral)  Resp 18  Wt 118 lb (53.5 kg)  SpO2 95%  BMI 19.64 kg/m2 Estimated body mass index is 19.64 kg/(m^2) as calculated from the following:    Height as of 1/5/17: 5' 5\" (1.651 m).    Weight as of this encounter: 118 lb (53.5 kg).  Medication Reconciliation: complete    "

## 2018-04-12 NOTE — PROGRESS NOTES
"SUBJECTIVE:                                                    Alka Mcfadden is a 57 year old female who presents to clinic today with her  for the following health issues:    Neoplasm of Thyroid Gland  Needs lab work as requested by [facility]. In the evening, pt would be very cold and have to turn on the electric blanket. However she would get very hot, turn off the electric heater and have to remove the layers she is wearing. In the morning, she would be nauseous and not in a good mood. States that the symptoms she experiences is similar to a hypoglycemic epidose, notes she was pre-diabetic. Has been taking medication as prescribed, has been trying to decrease most of her medication. She does not think it's withdrawal.      Chronic Pain  Yesterday she had \"bad\" pain radiating down her legs yesterday so she took a Valium, it did help alleviate pain. Still has hip pain, has an appointment with the surgeon soon. Would like her ears examined (more pain in the right than left), believes it is related to her chronic pain because she does clench her teeth. When this happens, she likes to take a q-tip with vaseline to coat her ears.     Abdominal Discomfort: Gallbladder, Constipation   Believes that it is related to her gallbladder, would like her gallbladder removed. Asked how a fatty liver will effect this and the function of the gallbladder. Pt's  reports that when pt does not have a bowel movement for a few days, she would take constipation medication. He notes she does not take them daily. He believes that her abdominal discomfort is related to her constipation medication. She does not want to add medication for pt reports that it \"amped\" her and likes her current state. Would like to figure out a diet to help with her abdominal discomfort. Does not believe she had GERD. Denies acid reflux.     Medical  Marijuana  After seeing a neurologist, pt received a letter from the [facility] ([place]) for " "medical marijuana. Would like to look into the cannabis oil but pt relates she doesn't know how that would be accepted in [place]. They are planning to visit for an extended period of time or perhaps even to live permanently. Asked about expense of medical marijuana. Has been given the name of a clinic that distributes medical marijuama. The clinic is called \"[facility]\" and asked about the certification process. Her  had concerns about side effects and if it would effect her thyroid through smoking. Asked if she could stay on Percocet while on medical marijuana.     Mental Health Follow up: Anxiety    Status since last visit:  persistently anxious about chronic pain, flares of panic about not being able to get her pain under control.     See PHQ-9 for current symptoms.  Other associated symptoms: None    Complicating factors: chronic pain, abdominal discomfort  Significant life event:  No   Current substance abuse:  None  Anxiety or Panic symptoms:  No    Pt came to appointment in a wheelchair.    Family  Has been  for 43 years, they met when pt was 11 years old. Her son has Barretts Esophagus. States her mother had breast cancer and the breast implants ruptured. Had multiple questions about synthetic objects in the body.     Smoking - \"Only a puff here and there.\"     PHQ-9  English PHQ-9   Any Language           HCV Screening - Needs lab work.    Skin Moles  Her  would like provider to examine a mole on her back and abdomen. Relates that it does bother here. Would like to see dermatology.      Problems taking medications regularly: No    Medication side effects: none    Diet: regular (no restrictions)    Social History   Substance Use Topics     Smoking status: Light Tobacco Smoker     Types: Cigarettes     Smokeless tobacco: Never Used      Comment: 3-4 cigarettes per week.     Alcohol use No      Problem list and histories reviewed & adjusted, as indicated.  Additional history: as " documented    Patient Active Problem List   Diagnosis     Encounter for long-term use of opiate analgesic     Hyperalgesia      ### Cataract, OU     Dry eyes     Hyperlipidemia LDL goal <130     Constipation     Insomnia     Tobacco use disorder     Anxiety     Chronic pain syndrome     Suicidal ideation     Nausea     Pancreatic atrophy     Haney's esophagus determined by biopsy     Chronic obstructive pulmonary disease (H) dx at [place]     Malignant neoplasm of thyroid gland (H)     Complex regional pain syndrome type 2 of left lower extremity     Ankylosing spondylitis of multiple sites in spine (H)     Calculus of gallbladder without cholecystitis     Past Surgical History:   Procedure Laterality Date     ARTHROPLASTY REVISION HIP  2005, 2015     BACK SURGERY  1985/1990/1996     ENDOSCOPIC ULTRASOUND UPPER GASTROINTESTINAL TRACT (GI) N/A 1/5/2017    Procedure: ENDOSCOPIC ULTRASOUND, ESOPHAGOSCOPY / UPPER GASTROINTESTINAL TRACT (GI);  Surgeon: Esteban Mortensen MD;  Location: [facility] OR     ESOPHAGOSCOPY, GASTROSCOPY, DUODENOSCOPY (EGD), COMBINED N/A 1/5/2017    Procedure: COMBINED ESOPHAGOSCOPY, GASTROSCOPY, DUODENOSCOPY (EGD);  Surgeon: Esteban Mortensen MD;  Location: [facility] OR     JOINT REPLACEMENT  1985       Social History   Substance Use Topics     Smoking status: Light Tobacco Smoker     Types: Cigarettes     Smokeless tobacco: Never Used      Comment: 3-4 cigarettes per week.     Alcohol use No     History reviewed. No pertinent family history.        Current Outpatient Prescriptions   Medication Sig Dispense Refill     oxyCODONE-acetaminophen (PERCOCET) 5-325 MG per tablet Take 1-2 tablets by mouth every 4 hours as needed for pain maximum 8 tablet(s) per day 240 tablet 0     diazepam (VALIUM) 10 MG tablet Take 0.5-1 tablets (5-10 mg) by mouth every 12 hours as needed for anxiety or sleep 60 tablet 0     mirtazapine (REMERON) 30 MG tablet Take 1 tablet (30 mg) by mouth At Bedtime 30 tablet 11      senna-docusate (SENEXON-S) 8.6-50 MG per tablet Take 2 tablets by mouth 2 times daily as needed for constipation 120 tablet 0     temazepam (RESTORIL) 30 MG capsule TAKE ONE CAPSULE (30MG) BY MOUTH NIGHTLY AS NEEDED FOR SLEEP 30 capsule 0     naloxone (NARCAN) nasal spray Spray 1 spray (4 mg) in nostril as needed for opioid reversal 2 each 1     ondansetron (ZOFRAN) 4 MG tablet Take 1 tablet (4 mg) by mouth every 8 hours as needed for nausea 40 tablet 0     albuterol (PROAIR HFA/PROVENTIL HFA/VENTOLIN HFA) 108 (90 BASE) MCG/ACT Inhaler Inhale 2 puffs into the lungs every 4 hours as needed for shortness of breath / dyspnea or wheezing 1 Inhaler 3     ADVAIR DISKUS 250-50 MCG/DOSE diskus inhaler INHALE 1 PUFF PO Q 12 HOURS .TK ON A SCHEDULED BASIS FOR COPD  0     omeprazole (PRILOSEC) 40 MG capsule Take 1 capsule (40 mg) by mouth daily Take 30-60 minutes before a meal. 30 capsule 1     Allergies   Allergen Reactions     Amoxicillin-Pot Clavulanate Rash     Augmentin Rash     Amitriptyline      Had reaction while on this and vistaril; was just not herself.     Hydroxyzine Other (See Comments)     Had reaction when took this along with amitriptyline.     Vistaril      Had reaction when took this along with amitriptyline.     Recent Labs   Lab Test  02/02/17   1657  01/05/17   0904   12/01/16   1349  12/01/16   0937  10/09/14   LDL   --    --    --    --    --    --   126   HDL   --    --    --    --    --    --   39   TRIG   --    --    --    --    --    --   137   ALT  30  16   --    --   15   < >   --    CR  0.75  0.64   < >   --   0.68   < >   --    GFRESTIMATED  79  >90  Non  GFR Calc     < >   --   89   < >   --    GFRESTBLACK  >90   GFR Calc    >90   GFR Calc     < >   --   >90   GFR Calc     < >   --    POTASSIUM  4.4  4.2   < >   --   3.7   < >   --    TSH  0.70   --    --   0.34*   --    < >   --     < > = values in this interval not displayed.       BP Readings from Last 3 Encounters:   04/12/18 102/64   02/21/18 98/64   01/04/18 102/64    Wt Readings from Last 3 Encounters:   04/12/18 53.5 kg (118 lb)   02/21/18 52.2 kg (115 lb)   01/04/18 51 kg (112 lb 8 oz)        Labs reviewed in EPIC  Problem list, Medication list, Allergies, and Medical/Social/Surgical histories reviewed in [facility] and updated as appropriate.     ROS: Constitutional, neuro, ENT, endocrine, pulmonary, cardiac, gastrointestinal, genitourinary, musculoskeletal, integument and psychiatric systems are negative, except as otherwise noted above in the HPI.    This document serves as a record of the services and decisions personally performed and made by Shanda Vo CNP. It was created on her behalf by Socorro Lyon, a trained medical scribe. The creation of this document is based on the provider's statements to the medical scribe.  Socorro Lyon 10:58 AM 4/12/2018    OBJECTIVE:                                                    /64  Pulse 75  Temp 98.2  F (36.8  C) (Oral)  Resp 18  Wt 53.5 kg (118 lb)  SpO2 95%  BMI 19.64 kg/m2  Body mass index is 19.64 kg/(m^2).  GENERAL: Thin, alert, well nourished, well hydrated  EYES: Eyes grossly normal to inspection, extraocular movements - intact, and PERRL  HENT: ear canals- L slightly retracted, right WNL;  Nose- normal; Mouth- no ulcers, no lesions  NECK: mild tenderness to palpation, reduced range of motion, no adenopathy, no asymmetry, no masses, no stiffness; thyroid- surgically removed.  RESP: lungs clear to auscultation - no rales, no rhonchi, no wheezes  CV: regular rates and rhythm, normal S1 S2, no S3 or S4 and no murmur,  ABDOMEN: flat, hypoactive BS, no tenderness, no  hepatosplenomegaly, no masses,   MS: extremities- no gross deformities noted, no edema  SKIN: 2 lesions:  1 cm diameter, raised poorly circumscribed, brown irregular surface One on upper right posterior shoulder, one on upper abdomen.   NEURO: strength and  tone- normal, sensory exam- grossly normal, mentation- intact, speech- normal,   Non focal no aphasia. No facial asymmetry.   BACK: surgical hardware outline visible.  no CVA tenderness, no paralumbar tenderness    Mental Status Assessment:  Appearance:   Appropriate   Eye Contact:   Good   Psychomotor Behavior: Normal   Attitude:   Cooperative   Orientation:   All  Speech   Rate / Production: Normal    Volume:  Normal   Mood:    Anxious   Affect:    Appropriate   Thought Content:  Clear   Thought Form:  Coherent  Logical   Insight:    Good   Attention Span and Concentration:  good  Recent and Remote Memory:  intact  Fund of Knowledge: appropriate  Muscle Strength and Tone: normal   Suicidal Ideation: reports no thoughts, no intention  Hallucination: No  Paranoid-No  Manic-No  Panic-No  Self harm-No       ASSESSMENT/PLAN:                                                    (C73) Malignant neoplasm of thyroid gland (H)  (primary encounter diagnosis)  Comment: Interval Labs done at request of [facility] Results to be faxed to [facility].  Plan: T3, Free, Anti thyroglobulin antibody, T4,         free, TSH, CBC with platelets        Has a follow up visit next month at [place].    (Z79.891) Encounter for long-term use of opiate analgesic  Comment: Struggling with abdominal discomfort every morning.  Pain medication only in short acting form,  Not typically taking pain medication during the night.   Plan: Discussed use of Oxycontin at night only.  Declining.  Stated she really hated her experience with taking oxycontin long acting Oxycodone.  Will readdress this in the morning. Encouraged her to eat something small first thing in the morning 1 hour after taking her Levothyroxine    (G89.4) Chronic pain syndrome  Comment: Pain management improved, however continues to struggle with pain at night and in the early morning.   Plan: CBC with platelets        Offered and Declined follow up with the [facility].        (K80.20) Calculus of gallbladder without cholecystitis without obstruction  Comment: Concerned that her gallstone is not being addressed.  She is concerned that her morning stomach pain is related to the identified gallstone.   Plan: Encouraged to record episodes of abdominal pain, and consider if there is a relationship between eating and episodes of pain.     (K59.03) Drug-induced constipation  Comment: Struggling with regulating bowels.  Has senna and Miralax  Plan: Instructed to take Miralax daily, to adjust the amount but not the frequency.  To pair taking a Senna S with taking oral oxycodone.     (F41.9) Anxiety  Comment: Continues to request use of diazepam for muscle spasms and anxiety.   Plan: Discussed the risks of respiratory depression with the combination of narcotics and benzodiazepines,  has been instructed on use of Narcan nasal spray.     (Z11.59) Encounter for HCV screening test for low risk patient  Comment: Last Hep C testing was inconclusive, recommended retesting.   Plan: Hepatitis C Screen Reflex to RNA FUTURE       Will call with results that require further intervention, otherwise will send a letter.              (L98.9) Skin lesion  Comment: 2 raised lesions on torso (upper back and abdomen), increased size  Plan: DERMATOLOGY REFERRAL        Strongly encouraged follow up with Dermatology.  Lesions to be evaluated by dermatololgy    Remeron (mirtazapine)  High Risk Drug Monitoring? Yes  Name of Drug being monitored: Remeron  Reason for drug, and what is being monitored and why: Mood-anxious/SI-none. Lipid and CBC.  Recent Labs   Lab Test 10/09/14   CHOL  192   HDL  39   LDL  126   TRIG  137   CHOLHDLRATIO  4.92     Lab Results   Component Value Date    WBC 10.0 02/02/2017     Lab Results   Component Value Date    RBC 4.62 02/02/2017     Lab Results   Component Value Date    HGB 14.3 02/02/2017     Lab Results   Component Value Date    HCT 44.2 02/02/2017     No components found  for: MCT  Lab Results   Component Value Date    MCV 96 02/02/2017     Lab Results   Component Value Date    MCH 31.0 02/02/2017     Lab Results   Component Value Date    MCHC 32.4 02/02/2017     Lab Results   Component Value Date    RDW 12.7 02/02/2017     Lab Results   Component Value Date     02/02/2017     Follow-up Plan: Continue current treatment plan, small weight gain, this is however, desired at this time.     Patient Instructions:  The key with the Miralax is to take the same amount each day, could be 1 teaspoon, up to 3 tablespoons.     I spent Greater than 40 minutes was spent face to face with Alka Mcfadden, with greater than 50 % in counselling and consultation about Chronic pain, Follow up on thyroid cancer, long term opiate use, gall stone/gallbladder disease, constipation, depression and anxiety.     The information in this document, created by the medical scribe, Socorro Lyon, for me, accurately reflects the services I personally performed and the decisions made by me. I have reviewed and approved this document for accuracy prior to leaving the patient care area.    BOBBY Butcher [facility] PRIMARY [facility]

## 2018-04-13 LAB
T4 FREE SERPL-MCNC: 0.99 NG/DL (ref 0.76–1.46)
THYROGLOB AB SERPL IA-ACNC: <20 IU/ML (ref 0–40)
TSH SERPL DL<=0.005 MIU/L-ACNC: 1.85 MU/L (ref 0.4–4)

## 2018-04-19 ENCOUNTER — TELEPHONE (OUTPATIENT)
Dept: FAMILY MEDICINE | Facility: CLINIC | Age: 58
End: 2018-04-19

## 2018-04-19 NOTE — TELEPHONE ENCOUNTER
Request from PCP to fax labs to [facility].    Writer called Pt and got fax for Dr. Lloyd Garcia.  [phone].    Results faxed to [place],  notified.    Nickolas Dempsey RN

## 2018-04-30 DIAGNOSIS — G89.4 CHRONIC PAIN SYNDROME: ICD-10-CM

## 2018-04-30 DIAGNOSIS — C73 MALIGNANT NEOPLASM OF THYROID GLAND (H): ICD-10-CM

## 2018-04-30 DIAGNOSIS — G57.72 COMPLEX REGIONAL PAIN SYNDROME TYPE 2 OF LEFT LOWER EXTREMITY: ICD-10-CM

## 2018-04-30 NOTE — TELEPHONE ENCOUNTER
Last Written Prescription Date:  4/4/18  Last Fill Quantity: 240,  # refills: 0   Last office visit: 4/12/2018 with prescribing provider:     Future Office Visit:      Requested Prescriptions   Pending Prescriptions Disp Refills     oxyCODONE-acetaminophen (PERCOCET) 5-325 MG per tablet 240 tablet 0     Sig: Take 1-2 tablets by mouth every 4 hours as needed for pain maximum 8 tablet(s) per day    There is no refill protocol information for this order

## 2018-04-30 NOTE — TELEPHONE ENCOUNTER
oxyCODONE-acetaminophen (PERCOCET) 5-325 MG per tablet  Controlled Substance Refill Request    Last refill: 4/4/18, , 240 for 30 days     Last clinic visit: 4/12/18    Next appt: none    Controlled substance agreement on file: No.    Documentation in problem list reviewed:  Yes    Processing:  Patient will  in clinic    [place] reviewed:  reviewed- no concerns  [place]  [email or web address]    Nickolas Dempsey RN

## 2018-05-01 RX ORDER — OXYCODONE AND ACETAMINOPHEN 5; 325 MG/1; MG/1
1-2 TABLET ORAL EVERY 4 HOURS PRN
Qty: 240 TABLET | Refills: 0 | Status: SHIPPED | OUTPATIENT
Start: 2018-05-01 | End: 2018-05-30

## 2018-05-01 NOTE — TELEPHONE ENCOUNTER
Prescription printed, signed and given to Clinic RN.     Shanda Vo, CNP, APNP  [facility] Primary Care [facility]

## 2018-05-30 ENCOUNTER — OFFICE VISIT (OUTPATIENT)
Dept: FAMILY MEDICINE | Facility: CLINIC | Age: 58
End: 2018-05-30
Payer: COMMERCIAL

## 2018-05-30 ENCOUNTER — OFFICE VISIT (OUTPATIENT)
Dept: BEHAVIORAL HEALTH | Facility: CLINIC | Age: 58
End: 2018-05-30
Payer: COMMERCIAL

## 2018-05-30 VITALS
RESPIRATION RATE: 16 BRPM | OXYGEN SATURATION: 98 % | SYSTOLIC BLOOD PRESSURE: 104 MMHG | TEMPERATURE: 98.5 F | WEIGHT: 116 LBS | HEART RATE: 96 BPM | DIASTOLIC BLOOD PRESSURE: 62 MMHG | BODY MASS INDEX: 19.3 KG/M2

## 2018-05-30 DIAGNOSIS — C73 MALIGNANT NEOPLASM OF THYROID GLAND (H): ICD-10-CM

## 2018-05-30 DIAGNOSIS — G57.72 COMPLEX REGIONAL PAIN SYNDROME TYPE 2 OF LEFT LOWER EXTREMITY: ICD-10-CM

## 2018-05-30 DIAGNOSIS — Z11.59 NEED FOR HEPATITIS C SCREENING TEST: ICD-10-CM

## 2018-05-30 DIAGNOSIS — G89.4 CHRONIC PAIN SYNDROME: ICD-10-CM

## 2018-05-30 DIAGNOSIS — F41.9 ANXIETY: ICD-10-CM

## 2018-05-30 DIAGNOSIS — Z13.5 SCREENING FOR DIABETIC RETINOPATHY: ICD-10-CM

## 2018-05-30 DIAGNOSIS — F41.9 ANXIETY: Primary | ICD-10-CM

## 2018-05-30 DIAGNOSIS — Z11.4 SCREENING FOR HIV (HUMAN IMMUNODEFICIENCY VIRUS): ICD-10-CM

## 2018-05-30 PROCEDURE — 36415 COLL VENOUS BLD VENIPUNCTURE: CPT | Performed by: NURSE PRACTITIONER

## 2018-05-30 PROCEDURE — 99214 OFFICE O/P EST MOD 30 MIN: CPT | Performed by: NURSE PRACTITIONER

## 2018-05-30 PROCEDURE — 87389 HIV-1 AG W/HIV-1&-2 AB AG IA: CPT | Performed by: NURSE PRACTITIONER

## 2018-05-30 PROCEDURE — 86803 HEPATITIS C AB TEST: CPT | Performed by: NURSE PRACTITIONER

## 2018-05-30 PROCEDURE — 90832 PSYTX W PT 30 MINUTES: CPT | Performed by: SOCIAL WORKER

## 2018-05-30 RX ORDER — TANGERINE OIL
OIL (ML) MISCELLANEOUS
COMMUNITY

## 2018-05-30 RX ORDER — DIAZEPAM 5 MG
2.5-5 TABLET ORAL EVERY 12 HOURS PRN
Qty: 60 TABLET | Refills: 0 | Status: SHIPPED | OUTPATIENT
Start: 2018-05-30 | End: 2018-06-29

## 2018-05-30 RX ORDER — OXYCODONE AND ACETAMINOPHEN 5; 325 MG/1; MG/1
1-2 TABLET ORAL EVERY 6 HOURS PRN
Qty: 180 TABLET | Refills: 0 | Status: SHIPPED | OUTPATIENT
Start: 2018-05-30 | End: 2018-06-29

## 2018-05-30 NOTE — MR AVS SNAPSHOT
"              After Visit Summary   2018    Alka Mcfadden    MRN: [id number]           Patient Information     Date Of Birth          1960        Visit Information        Provider Department      2018 1:30 PM [facility]        Today's Diagnoses     Anxiety    -  1       Follow-ups after your visit        Who to contact     If you have questions or need follow up information about today's clinic visit or your schedule please contact [facility] directly at [phone].  Normal or non-critical lab and imaging results will be communicated to you by LaunchLabhart, letter or phone within 4 business days after the clinic has received the results. If you do not hear from us within 7 days, please contact the clinic through LaunchLabhart or phone. If you have a critical or abnormal lab result, we will notify you by phone as soon as possible.  Submit refill requests through [employer] or call your pharmacy and they will forward the refill request to us. Please allow 3 business days for your refill to be completed.          Additional Information About Your Visit        MyChart Information     Element ID lets you send messages to your doctor, view your test results, renew your prescriptions, schedule appointments and more. To sign up, go to [email or web address] . Click on \"Log in\" on the left side of the screen, which will take you to the Welcome page. Then click on \"Sign up Now\" on the right side of the page.     You will be asked to enter the access code listed below, as well as some personal information. Please follow the directions to create your username and password.     Your access code is: [email or web address]  Expires: 2018  1:25 PM     Your access code will  in 90 days. If you need help or a new code, please call your [facility] or [phone].        Care EveryWhere ID     This is your Care EveryWhere ID. This could be " used by other organizations to access your [place] medical records  [id number]         Blood Pressure from Last 3 Encounters:   05/30/18 104/62   04/12/18 102/64   02/21/18 98/64    Weight from Last 3 Encounters:   05/30/18 116 lb (52.6 kg)   04/12/18 118 lb (53.5 kg)   02/21/18 115 lb (52.2 kg)              Today, you had the following     No orders found for display         Today's Medication Changes          These changes are accurate as of 5/30/18  4:04 PM.  If you have any questions, ask your nurse or doctor.               These medicines have changed or have updated prescriptions.        Dose/Directions    diazepam 5 MG tablet   Commonly known as:  VALIUM   Indication:  Feeling Anxious, Muscle Spasm   This may have changed:    - medication strength  - how much to take   Used for:  Anxiety, Chronic pain syndrome   Changed by:  Shanda Vo APRN CNP        Dose:  2.5-5 mg   Take 0.5-1 tablets (2.5-5 mg) by mouth every 12 hours as needed for anxiety or sleep   Quantity:  60 tablet   Refills:  0       oxyCODONE-acetaminophen 5-325 MG per tablet   Commonly known as:  PERCOCET   This may have changed:    - when to take this  - additional instructions   Used for:  Chronic pain syndrome, Malignant neoplasm of thyroid gland (H), Complex regional pain syndrome type 2 of left lower extremity   Changed by:  Shanda Vo APRN CNP        Dose:  1-2 tablet   Take 1-2 tablets by mouth every 6 hours as needed for pain maximum 6 tablet(s) per day   Quantity:  180 tablet   Refills:  0            Where to get your medicines      Some of these will need a paper prescription and others can be bought over the counter.  Ask your nurse if you have questions.     Bring a paper prescription for each of these medications     diazepam 5 MG tablet    oxyCODONE-acetaminophen 5-325 MG per tablet                Primary Care Provider Office Phone # Fax #    BOBBY Balbuena [phone] [facility] [address]  [facility] [address]        Equal Access to Services     CARLTONSHANDRA JIMMY : Hadii jerry chua frederic Somerlene, wafarheenda luqadaha, qaybta kagenevieveda greysonalessandrokevin, felipe kat lyndsaytitus dengikerrocio snell. So [facility] [phone].    ATENCIÓN: Si habla español, tiene a olmstead disposición servicios gratuitos de asistencia lingüística. Llame al [phone].    We comply with applicable federal civil rights laws and [place] laws. We do not discriminate on the basis of race, color, national origin, age, disability, sex, sexual orientation, or gender identity.            Thank you!     Thank you for choosing [facility] PRIMARY CARE  for your care. Our goal is always to provide you with excellent care. Hearing back from our patients is one way we can continue to improve our services. Please take a few minutes to complete the written survey that you may receive in the mail after your visit with us. Thank you!             Your Updated Medication List - Protect others around you: Learn how to safely use, store and throw away your medicines at www.disposemymeds.org.          This list is accurate as of 5/30/18  4:04 PM.  Always use your most recent med list.                   Brand Name Dispense Instructions for use Diagnosis    ADVAIR DISKUS 250-50 MCG/DOSE diskus inhaler   Generic drug:  fluticasone-salmeterol      INHALE 1 PUFF PO Q 12 HOURS .TK ON A SCHEDULED BASIS FOR COPD    Chronic obstructive pulmonary disease with acute lower respiratory infection (H)       albuterol 108 (90 Base) MCG/ACT Inhaler    PROAIR HFA/PROVENTIL HFA/VENTOLIN HFA    1 Inhaler    Inhale 2 puffs into the lungs every 4 hours as needed for shortness of breath / dyspnea or wheezing    Chronic obstructive pulmonary disease with acute lower respiratory infection (H)       diazepam 5 MG tablet    VALIUM    60 tablet    Take 0.5-1 tablets (2.5-5 mg) by mouth every 12 hours as needed for anxiety or sleep    Anxiety, Chronic pain  syndrome       SANDRA PO           mirtazapine 30 MG tablet    REMERON    30 tablet    Take 1 tablet (30 mg) by mouth At Bedtime    Recurrent major depressive disorder, in partial remission (H)       naloxone nasal spray    NARCAN    2 each    Spray 1 spray (4 mg) in nostril as needed for opioid reversal    Encounter for long-term use of opiate analgesic       omeprazole 40 MG capsule    priLOSEC    30 capsule    Take 1 capsule (40 mg) by mouth daily Take 30-60 minutes before a meal.    Haney's esophagus without dysplasia       ondansetron 4 MG tablet    ZOFRAN    40 tablet    Take 1 tablet (4 mg) by mouth every 8 hours as needed for nausea    Nausea       oxyCODONE-acetaminophen 5-325 MG per tablet    PERCOCET    180 tablet    Take 1-2 tablets by mouth every 6 hours as needed for pain maximum 6 tablet(s) per day    Chronic pain syndrome, Malignant neoplasm of thyroid gland (H), Complex regional pain syndrome type 2 of left lower extremity       senna-docusate 8.6-50 MG per tablet    SENEXON-S    120 tablet    Take 2 tablets by mouth 2 times daily as needed for constipation    Encounter for long-term use of opiate analgesic       Tangerine Oil           temazepam 30 MG capsule    RESTORIL    30 capsule    TAKE ONE CAPSULE (30MG) BY MOUTH NIGHTLY AS NEEDED FOR SLEEP    Insomnia, unspecified type, Chronic pain syndrome

## 2018-05-30 NOTE — MR AVS SNAPSHOT
"              After Visit Summary   5/30/2018    Alka Mcfadden    MRN: [id number]           Patient Information     Date Of Birth          1960        Visit Information        Provider Department      5/30/2018 1:30 PM Shanda Vo APRN CNP [facility]        Today's Diagnoses     Need for hepatitis C screening test        Screening for HIV (human immunodeficiency virus)        Screening for diabetic retinopathy        Anxiety        Chronic pain syndrome        Malignant neoplasm of thyroid gland (H)        Complex regional pain syndrome type 2 of left lower extremity           Follow-ups after your visit        Who to contact     If you have questions or need follow up information about today's clinic visit or your schedule please contact [facility] directly at [phone].  Normal or non-critical lab and imaging results will be communicated to you by [facility], letter or phone within 4 business days after the clinic has received the results. If you do not hear from us within 7 days, please contact the clinic through [facility] or phone. If you have a critical or abnormal lab result, we will notify you by phone as soon as possible.  Submit refill requests through [facility] or call your pharmacy and they will forward the refill request to us. Please allow 3 business days for your refill to be completed.          Additional Information About Your Visit        MyChart Information     [facility] lets you send messages to your doctor, view your test results, renew your prescriptions, schedule appointments and more. To sign up, go to [email or web address] . Click on \"Log in\" on the left side of the screen, which will take you to the Welcome page. Then click on \"Sign up Now\" on the right side of the page.     You will be asked to enter the access code listed below, as well as some personal information. Please follow the directions to create your username " and password.     Your access code is: [email or web address]  Expires: 2018  1:25 PM     Your access code will  in 90 days. If you need help or a new code, please call your [place] clinic or [phone].        Care EveryWhere ID     This is your Care EveryWhere ID. This could be used by other organizations to access your [place] medical records  [id number]        Your Vitals Were     Pulse Temperature Respirations Pulse Oximetry BMI (Body Mass Index)       96 98.5  F (36.9  C) (Oral) 16 98% 19.3 kg/m2        Blood Pressure from Last 3 Encounters:   18 104/62   18 102/64   18 98/64    Weight from Last 3 Encounters:   18 116 lb (52.6 kg)   18 118 lb (53.5 kg)   18 115 lb (52.2 kg)              We Performed the Following     Hepatitis C Screen Reflex to HCV RNA Quant and Genotype     HIV Screening          Today's Medication Changes          These changes are accurate as of 18  2:16 PM.  If you have any questions, ask your nurse or doctor.               These medicines have changed or have updated prescriptions.        Dose/Directions    diazepam 5 MG tablet   Commonly known as:  VALIUM   Indication:  Feeling Anxious, Muscle Spasm   This may have changed:    - medication strength  - how much to take   Used for:  Anxiety, Chronic pain syndrome   Changed by:  Shanda Vo APRN CNP        Dose:  2.5-5 mg   Take 0.5-1 tablets (2.5-5 mg) by mouth every 12 hours as needed for anxiety or sleep   Quantity:  60 tablet   Refills:  0       oxyCODONE-acetaminophen 5-325 MG per tablet   Commonly known as:  PERCOCET   This may have changed:    - when to take this  - additional instructions   Used for:  Chronic pain syndrome, Malignant neoplasm of thyroid gland (H), Complex regional pain syndrome type 2 of left lower extremity   Changed by:  Shanda Vo APRN CNP        Dose:  1-2 tablet   Take 1-2 tablets by mouth every 6 hours as needed for pain maximum 6 tablet(s)  per day   Quantity:  180 tablet   Refills:  0            Where to get your medicines      Some of these will need a paper prescription and others can be bought over the counter.  Ask your nurse if you have questions.     Bring a paper prescription for each of these medications     diazepam 5 MG tablet    oxyCODONE-acetaminophen 5-325 MG per tablet               Information about OPIOIDS     PRESCRIPTION OPIOIDS: WHAT YOU NEED TO KNOW   You have a prescription for an opioid (narcotic) pain medicine. Opioids can cause addiction. If you have a history of chemical dependency of any type, you are at a higher risk of becoming addicted to opioids. Only take this medicine after all other options have been tried. Take it for as short a time and as few doses as possible.     Do not:    Drive. If you drive while taking these medicines, you could be arrested for driving under the influence (DUI).    Operate heavy machinery    Do any other dangerous activities while taking these medicines.     Drink any alcohol while taking these medicines.      Take with any other medicines that contain acetaminophen. Read all labels carefully. Look for the word  acetaminophen  or  Tylenol.  Ask your pharmacist if you have questions or are unsure.    Store your pills in a secure place, locked if possible. We will not replace any lost or stolen medicine. If you don t finish your medicine, please throw away (dispose) as directed by your pharmacist. The [place] [employer]ution [employer] has more information about safe disposal: https://www.pca.[facility].[place].us/living-green/managing-unwanted-medications    All opioids tend to cause constipation. Drink plenty of water and eat foods that have a lot of fiber, such as fruits, vegetables, prune juice, apple juice and high-fiber cereal. Take a laxative (Miralax, milk of magnesia, Colace, Senna) if you don t move your bowels at least every other day.          Primary Care Provider Office Phone # Fax #     Shanda Kempn, APRN [phone]       [address] [facility] [address]  [facility] [address]        Equal Access to Services     PAULIE MARQUEZ : Hadii jerry chua palako Sorlali, waaxda luqadaha, qaybta kaalmada adejamesda, felipe browne anibeatriz cotter laJosuemartin . So [facility] [phone].    ATENCIÓN: Si habla español, tiene a olmstead disposición servicios gratuitos de asistencia lingüística. Llame al [phone].    We comply with applicable federal civil rights laws and [place] laws. We do not discriminate on the basis of race, color, national origin, age, disability, sex, sexual orientation, or gender identity.            Thank you!     Thank you for choosing [facility]ARE  for your care. Our goal is always to provide you with excellent care. Hearing back from our patients is one way we can continue to improve our services. Please take a few minutes to complete the written survey that you may receive in the mail after your visit with us. Thank you!             Your Updated Medication List - Protect others around you: Learn how to safely use, store and throw away your medicines at www.disposemymeds.org.          This list is accurate as of 5/30/18  2:16 PM.  Always use your most recent med list.                   Brand Name Dispense Instructions for use Diagnosis    ADVAIR DISKUS 250-50 MCG/DOSE diskus inhaler   Generic drug:  fluticasone-salmeterol      INHALE 1 PUFF PO Q 12 HOURS .TK ON A SCHEDULED BASIS FOR COPD    Chronic obstructive pulmonary disease with acute lower respiratory infection (H)       albuterol 108 (90 Base) MCG/ACT Inhaler    PROAIR HFA/PROVENTIL HFA/VENTOLIN HFA    1 Inhaler    Inhale 2 puffs into the lungs every 4 hours as needed for shortness of breath / dyspnea or wheezing    Chronic obstructive pulmonary disease with acute lower respiratory infection (H)       diazepam 5 MG tablet    VALIUM    60 tablet    Take 0.5-1 tablets (2.5-5 mg) by mouth every 12  hours as needed for anxiety or sleep    Anxiety, Chronic pain syndrome       SANDRA PO           mirtazapine 30 MG tablet    REMERON    30 tablet    Take 1 tablet (30 mg) by mouth At Bedtime    Recurrent major depressive disorder, in partial remission (H)       naloxone nasal spray    NARCAN    2 each    Spray 1 spray (4 mg) in nostril as needed for opioid reversal    Encounter for long-term use of opiate analgesic       omeprazole 40 MG capsule    priLOSEC    30 capsule    Take 1 capsule (40 mg) by mouth daily Take 30-60 minutes before a meal.    Haney's esophagus without dysplasia       ondansetron 4 MG tablet    ZOFRAN    40 tablet    Take 1 tablet (4 mg) by mouth every 8 hours as needed for nausea    Nausea       oxyCODONE-acetaminophen 5-325 MG per tablet    PERCOCET    180 tablet    Take 1-2 tablets by mouth every 6 hours as needed for pain maximum 6 tablet(s) per day    Chronic pain syndrome, Malignant neoplasm of thyroid gland (H), Complex regional pain syndrome type 2 of left lower extremity       senna-docusate 8.6-50 MG per tablet    SENEXON-S    120 tablet    Take 2 tablets by mouth 2 times daily as needed for constipation    Encounter for long-term use of opiate analgesic       Tangerine Oil           temazepam 30 MG capsule    RESTORIL    30 capsule    TAKE ONE CAPSULE (30MG) BY MOUTH NIGHTLY AS NEEDED FOR SLEEP    Insomnia, unspecified type, Chronic pain syndrome

## 2018-05-30 NOTE — PROGRESS NOTES
[facility] - Integrated Primary Care   May 30, 2018      Behavioral Health Clinician Progress Note    Patient Name: Alka Mcfadden           Service Type: Individual      Service Location:   Face to Face in Clinic     Session Start Time: 1:45pm  Session End Time: 2:10pm      Session Length: 16 - 37      Attendees: Patient and Spouse / Significant Other    Visit Activities (Refresh list every visit): [facility] [employer]    Diagnostic Assessment Date: not completed yet  Treatment Plan Review Date: not completed yet  See Flowsheets for today's PHQ-9 and JAMAL-7 results  Previous PHQ-9:   PHQ-9 SCORE 12/8/2015 7/15/2016 12/5/2016   Total Score - - -   Total Score - - -   Total Score 0 0 0     Previous JAMAL-7:   JAMAL-7 SCORE 12/8/2015 7/15/2016 12/5/2016   Total Score - - -   Total Score 0 0 0   Total Score - - -       KENDRA LEVEL:  No flowsheet data found.    DATA  Extended Session (60+ minutes): No  Interactive Complexity: No  Crisis: No    Treatment Objective(s) Addressed in This Session:  Target Behavior(s): disease management/lifestyle changes mental health    Depressed Mood: Increase interest, engagement, and pleasure in doing things  Decrease frequency and intensity of feeling down, depressed, hopeless  Identify negative self-talk and behaviors: challenge core beliefs, myths, and actions  Improve concentration, focus, and mindfulness in daily activities   Anxiety: will experience a reduction in anxiety, will develop more effective coping skills to manage anxiety symptoms, will develop healthy cognitive patterns and beliefs and will increase ability to function adaptively  Psychological distress related to Pain  Psychological distress related to Chronic Disease Management    Current Stressors / Issues:  The patient was seen by [facility] per the request of the PCP.  She reported she was seen by hip specialist and she is planing going to PT classes-she has started a new medication-she had recent pain flare and she was able to  "manage this flare-she has started marijuana medicine and she had discussion with PCP-waking during the night-she has better pain management and she has been more active-more present and engaging in conversations-improved appetite and she reported having \"off and on\" appetite-improved management of her anxiety-she stated that she is coming back to the way she was before she got sick-she now has answers and a treatment that is working for the patient-regarding mood she reported having improved mood better with depressive symptoms-        Progress on Treatment Objective(s) / Homework:  Minimal progress - ACTION (Actively working towards change); Intervened by reinforcing change plan / affirming steps taken    Motivational Interviewing    MI Intervention: Expressed Empathy/Understanding, Supported Autonomy, Collaboration, Evocation, Permission to raise concern or advise, Open-ended questions, Reflections: simple and complex, Rolled with resistance: Simple reflection, Complex reflection and Evoked patient agenda and Change talk (evoked)     Change Talk Expressed by the Patient: Desire to change Ability to change Reasons to change Need to change Committment to change Activation Taking steps    Provider Response to Change Talk: E - Evoked more info from patient about behavior change, A - Affirmed patient's thoughts, decisions, or attempts at behavior change, R - Reflected patient's change talk and S - Summarized patient's change talk statements      Care Plan review completed: No    Medication Review:  No changes to current psychiatric medication(s)    Medication Compliance:  NA    Changes in Health Issues:   Yes: Pain, Associated Psychological Distress  Chronic disease management, Associated Psychological Distress    Chemical Use Review:   Substance Use: Chemical use reviewed, no active concerns identified      Tobacco Use: No current tobacco use.      Assessment: Current Emotional / Mental Status (status of significant " symptoms):  Risk status (Self / Other harm or suicidal ideation)  Patient denies a history of suicidal ideation, suicide attempts, self-injurious behavior, homicidal ideation, homicidal behavior and and other safety concerns  Patient denies current fears or concerns for personal safety.  Patient denies current or recent suicidal ideation or behaviors.  Patient denies current or recent homicidal ideation or behaviors.  Patient denies current or recent self injurious behavior or ideation.  Patient denies other safety concerns.  A safety and risk management plan has not been developed at this time, however patient was encouraged to call [place] [phone] should there be a change in any of these risk factors.    Appearance:   Appropriate   Eye Contact:   Good   Psychomotor Behavior: Normal   Attitude:   Cooperative   Orientation:   All  Speech   Rate / Production: Normal    Volume:  Normal   Mood:    Normal  Affect:    Appropriate   Thought Content:  Clear   Thought Form:  Coherent  Goal Directed  Logical   Insight:    Good     Diagnoses:  1. Anxiety        Collateral Reports Completed:  Not Applicable    Plan: (Homework, other):  Patient was given information about behavioral services and encouraged to schedule a follow up appointment with the clinic [facility] as needed.  She was also given information about mental health symptoms and treatment options .  CD Recommendations: No indications of CD issues.  CHERY Benedict, [facility]      ______________________________________________________________________

## 2018-05-30 NOTE — PROGRESS NOTES
SUBJECTIVE:                                                    Alka Mcfadden is a 57 year old female who presents to clinic today with her  for the following health issues:  [facility]: [facility]    Mental Health Follow up: Anxiety    Status since last visit: much better    See PHQ-9 for current symptoms.  Other associated symptoms: None    Complicating factors: none  Significant life event:  No   Current substance abuse:  None  Anxiety or Panic symptoms:  No    Her anxiety and depression is improving. She feels like she is becoming who she used to be before she became ill. Showing interest in her books again, crafts, and the birds. She enjoys seeing people and does not have to worry about them seeing her in pain.    Sleep - Mirtazapine helps her fall asleep. She has been taking Valium 2.5mg occasionally.   Appetite - improved, although it waxes and wanes.     Body mass index is 19.3 kg/(m^2).  Wt Readings from Last 5 Encounters:   05/30/18 52.6 kg (116 lb)   04/12/18 53.5 kg (118 lb)   02/21/18 52.2 kg (115 lb)   01/04/18 51 kg (112 lb 8 oz)   10/16/17 49.4 kg (109 lb)     PHQ-9  English PHQ-9   Any Language           Chronic Pain Follow-Up  She is taking less Percocet. She has been taking up to 4 tablets. Her neck muscles are not as tight. Her orthopedist told her she has to put in the work to get better. She is stretching her legs more and they are improving.  Type / Location of Pain: Complex regional pain syndrome, all over  Analgesia/pain control:       Recent changes:  improved      Overall control: Tolerable with discomfort  Activity level/function:      Daily activities:  Can do most things most days, with some rest    Work:  not applicable  Adverse effects:  No  Adherance    Taking medication as directed?  Yes    Participating in other treatments: yes  Risk Factors:    Sleep:  Fair    Mood/anxiety:  slightly worsened    Recent family or social stressors:  none noted    Other aggravating factors: prolonged  sitting, prolonged standing and walking  PHQ-9 SCORE 12/8/2015 7/15/2016 12/5/2016   Total Score - - -   Total Score - - -   Total Score 0 0 0     JAMAL-7 SCORE 12/8/2015 7/15/2016 12/5/2016   Total Score - - -   Total Score 0 0 0   Total Score - - -     Encounter-Level CSA - 04/28/2015:          Controlled Substance Agreement - Scan on 5/1/2015 12:55 PM : Controlled Substance Agreement 04/28/15 (below)            Encounter-Level CSA - 04/28/2015:          Controlled Substance Agreement - Scan on 1/23/2015  9:22 AM : Controlled Medication Agreement 01/05/15 (below)                Abdominal Pain  Rubio thinks she needs to have a cholecystectomy and that her gallbladder may be contributing to her abdominal discomfort. They saw GI a year ago and inquired at the [facility], but no one seems to think she needs a cholecystectomy. They have not met with a surgeon. Alka comments she felt more comfortable when she was angled in the hospital bed.     Duration: 2 1/2 years maybe 3    Description (location/character/radiation): where gallstone is located       Associated flank pain: None    Intensity:  8/10    Accompanying signs and symptoms:        Fever/Chills: YES       Gas/Bloating: YES       Nausea/vomitting: YES       Diarrhea: no        Dysuria or Hematuria: no     History (previous similar pain/trauma/previous testing): ongoing    Precipitating or alleviating factors:       Pain worse with eating/BM/urination: eating       Pain relieved by BM: no     Therapies tried and outcome: None    LMP:  not applicable    Medical cannabis  She is on medical cannabis now but is not taking as much as they have her on. She has not used more than 3mL twice daily. She also has a vaporizer but it scares her because she does not want to be high from it. She uses the vaporizer in the morning because she wakes up feeling ill. The next time she goes to the dispensary she will inquire about cannabis spray instead. It has helped her to be more  active and her pain is tolerable to a point that she can sit and have a conversation and be engaged in it.     Moles  Her daughter bought her some hemp lotion and hemp oil which she started applying to her face. After about 2 weeks, she noticed that her moles were resolving.       Problems taking medications regularly: No    Medication side effects: none    Diet: avoiding wheat     Social History   Substance Use Topics     Smoking status: Light Tobacco Smoker     Types: Cigarettes     Smokeless tobacco: Never Used      Comment: 3-4 cigarettes per week.     Alcohol use No        Problem list and histories reviewed & adjusted, as indicated.  Additional history: as documented    Patient Active Problem List   Diagnosis     Encounter for long-term use of opiate analgesic     Hyperalgesia      ### Cataract, OU     Dry eyes     Hyperlipidemia LDL goal <130     Constipation     Insomnia     Tobacco use disorder     Anxiety     Chronic pain syndrome     Suicidal ideation     Nausea     Pancreatic atrophy     Haney's esophagus determined by biopsy     Chronic obstructive pulmonary disease (H) dx at [facility]     Malignant neoplasm of thyroid gland (H)     Complex regional pain syndrome type 2 of left lower extremity     Ankylosing spondylitis of multiple sites in spine (H)     Calculus of gallbladder without cholecystitis     Past Surgical History:   Procedure Laterality Date     ARTHROPLASTY REVISION HIP  2005, 2015     BACK SURGERY  1985/1990/1996     ENDOSCOPIC ULTRASOUND UPPER GASTROINTESTINAL TRACT (GI) N/A 1/5/2017    Procedure: ENDOSCOPIC ULTRASOUND, ESOPHAGOSCOPY / UPPER GASTROINTESTINAL TRACT (GI);  Surgeon: Esteban Mortensen MD;  Location: [facility] OR     ESOPHAGOSCOPY, GASTROSCOPY, DUODENOSCOPY (EGD), COMBINED N/A 1/5/2017    Procedure: COMBINED ESOPHAGOSCOPY, GASTROSCOPY, DUODENOSCOPY (EGD);  Surgeon: Esteban Mortensen MD;  Location: [facility] OR     JOINT REPLACEMENT  1985       Social History   Substance Use Topics      Smoking status: Light Tobacco Smoker     Types: Cigarettes     Smokeless tobacco: Never Used      Comment: 3-4 cigarettes per week.     Alcohol use No     No family history on file.        Current Outpatient Prescriptions   Medication Sig Dispense Refill     ADVAIR DISKUS 250-50 MCG/DOSE diskus inhaler INHALE 1 PUFF PO Q 12 HOURS .TK ON A SCHEDULED BASIS FOR COPD  0     albuterol (PROAIR HFA/PROVENTIL HFA/VENTOLIN HFA) 108 (90 BASE) MCG/ACT Inhaler Inhale 2 puffs into the lungs every 4 hours as needed for shortness of breath / dyspnea or wheezing 1 Inhaler 3     diazepam (VALIUM) 10 MG tablet Take 0.5-1 tablets (5-10 mg) by mouth every 12 hours as needed for anxiety or sleep 60 tablet 0     mirtazapine (REMERON) 30 MG tablet Take 1 tablet (30 mg) by mouth At Bedtime 30 tablet 11     naloxone (NARCAN) nasal spray Spray 1 spray (4 mg) in nostril as needed for opioid reversal 2 each 1     Norethindrone (SANDRA PO)        omeprazole (PRILOSEC) 40 MG capsule Take 1 capsule (40 mg) by mouth daily Take 30-60 minutes before a meal. 30 capsule 1     ondansetron (ZOFRAN) 4 MG tablet Take 1 tablet (4 mg) by mouth every 8 hours as needed for nausea 40 tablet 0     oxyCODONE-acetaminophen (PERCOCET) 5-325 MG per tablet Take 1-2 tablets by mouth every 4 hours as needed for pain maximum 8 tablet(s) per day 240 tablet 0     senna-docusate (SENEXON-S) 8.6-50 MG per tablet Take 2 tablets by mouth 2 times daily as needed for constipation 120 tablet 0     Tangerine OIL        temazepam (RESTORIL) 30 MG capsule TAKE ONE CAPSULE (30MG) BY MOUTH NIGHTLY AS NEEDED FOR SLEEP 30 capsule 0     Allergies   Allergen Reactions     Amoxicillin-Pot Clavulanate Rash     Augmentin Rash     Amitriptyline      Had reaction while on this and vistaril; was just not herself.     Hydroxyzine Other (See Comments)     Had reaction when took this along with amitriptyline.     Vistaril      Had reaction when took this along with amitriptyline.     Recent  Labs   Lab Test  04/12/18   1142  02/02/17   1657  01/05/17   0904   12/01/16   0937  10/09/14   LDL   --    --    --    --    --    --   126   HDL   --    --    --    --    --    --   39   TRIG   --    --    --    --    --    --   137   ALT   --   30  16   --   15   < >   --    CR   --   0.75  0.64   < >  0.68   < >   --    GFRESTIMATED   --   79  >90  Non  GFR Calc     < >  89   < >   --    GFRESTBLACK   --   >90   GFR Calc    >90   GFR Calc     < >  >90   GFR Calc     < >   --    POTASSIUM   --   4.4  4.2   < >  3.7   < >   --    TSH  1.85  0.70   --    < >   --    < >   --     < > = values in this interval not displayed.      BP Readings from Last 3 Encounters:   05/30/18 104/62   04/12/18 102/64   02/21/18 98/64    Wt Readings from Last 3 Encounters:   05/30/18 52.6 kg (116 lb)   04/12/18 53.5 kg (118 lb)   02/21/18 52.2 kg (115 lb)        Labs reviewed in EPIC  Problem list, Medication list, Allergies, and Medical/Social/Surgical histories reviewed in [facility] and updated as appropriate.     ROS: Constitutional, neuro, ENT, endocrine, pulmonary, cardiac, gastrointestinal, genitourinary, musculoskeletal, integument and psychiatric systems are negative, except as otherwise noted above in the HPI.    This document serves as a record of the services and decisions personally performed and made by Shanda Vo CNP. It was created on his/her behalf by Harrison Mcgowan trained medical scribe. The creation of this document is based the provider's statements to the medical scribes.    Gustavo Mcgowan 1:52 PM, May 30, 2018   OBJECTIVE:                                                    /62  Pulse 96  Temp 98.5  F (36.9  C) (Oral)  Resp 16  Wt 52.6 kg (116 lb)  SpO2 98%  BMI 19.3 kg/m2  Body mass index is 19.3 kg/(m^2).  GENERAL: healthy, alert, well nourished, well hydrated, no distress  EYES: Eyes grossly normal to inspection, extraocular movements -  intact, and PERRL  HENT: ear canals- normal; TMs- normal; Nose- normal; Mouth- no ulcers, no lesions  NECK: no tenderness, no adenopathy, no asymmetry, no masses, no stiffness; thyroid- normal to palpation  RESP: lungs clear to auscultation - no rales, no rhonchi, no wheezes  CV: regular rates and rhythm, normal S1 S2, no S3 or S4 and no murmur, no click or rub - no homans or cords  ABDOMEN: soft, no tenderness, no  hepatosplenomegaly, no masses, normal bowel sounds  MS: extremities- no gross deformities noted, no edema  SKIN: no suspicious lesions, no rashes  NEURO: strength and tone- normal, sensory exam- grossly normal, mentation- intact, speech- normal, reflexes- symmetric  Non focal no aphasia. No facial asymmetry. Finger to nose, rapid alteration, finger thumb opposition, heel knee shin are normal.  BACK: no CVA tenderness, no paralumbar tenderness  LYMPHATICS: ant. cervical- normal, post. cervical- normal, axillary- normal, supraclavicular- normal, inguinal- normal  Mental Status Assessment:  Appearance:   Appropriate   Eye Contact:   Good   Psychomotor Behavior: Normal   Attitude:   Cooperative   Orientation:   All  Speech   Rate / Production: Normal    Volume:  Normal   Mood:    Normal  Affect:    Appropriate   Thought Content:  Clear   Thought Form:  Coherent  Logical   Insight:    Good   Attention Span and Concentration:  limited  Recent and Remote Memory:  intact  Fund of Knowledge: appropriate  Muscle Strength and Tone: normal   Suicidal Ideation: reports no thoughts, no intention  Hallucination: No  Paranoid-No  Manic-No  Panic-No  Self harm-No    See [facility] notes     Diagnostic Test Results:  No results found for this or any previous visit (from the past 24 hour(s)).     ASSESSMENT/PLAN:                                                    (Z11.59) Need for hepatitis C screening test  Comment: Has not been tested for Hep C. Recommended one screening for people in her age group.   Plan: Hepatitis C Screen  Reflex to HCV RNA Quant and         Genotype        Will call with results that require further intervention, otherwise will send a letter.      (Z11.4) Screening for HIV (human immunodeficiency virus)  Comment: No high risk behaviors per her report.   Plan: HIV Screening        Will call with results that require further intervention, otherwise will send a letter.      (F41.9) Anxiety  Comment: Anxiety improved with improvement in pain management.   Plan: diazepam (VALIUM) 5 MG tablet        Long history of diazepam taken on a regular basis.  Discussed need to wean off when she is ready to reduce or discontinue her use of diazepam     (G89.4) Chronic pain syndrome  Comment: Has added Medical Marijuana to her pain management program.  Has gone through the legal channels for medical Marijuana.   Plan: diazepam (VALIUM) 5 MG tablet,         oxyCODONE-acetaminophen (PERCOCET) 5-325 MG per        tablet        Discussed medical marijuana  As an adjunct to over all pain management program.     (C73) Malignant neoplasm of thyroid gland (H)  Comment: Labs reviewed.  Labs forwarded to [facility].   Plan: oxyCODONE-acetaminophen (PERCOCET) 5-325 MG per        tablet        No further follow up at this time, recheck at [place] in 1 year.     (G57.72) Complex regional pain syndrome type 2 of left lower extremity  Comment: Well managed at this time.    Plan: oxyCODONE-acetaminophen (PERCOCET) 5-325 MG per        tablet        Current plan includes oxycodone, medical marijuana, and exercise, in addition to therapy support at [facility].       Remeron (mirtazapine)  High Risk Drug Monitoring? Yes  Name of Drug being monitored: Remeron  Reason for drug, and what is being monitored and why: Mood-improved/ SI-none. Lipid and CBC.  Recent Labs   Lab Test 10/09/14   CHOL  192   HDL  39   LDL  126   TRIG  137   CHOLHDLRATIO  4.92     Lab Results   Component Value Date    WBC 7.3 04/12/2018     Lab Results   Component Value Date    RBC  4.19 04/12/2018     Lab Results   Component Value Date    HGB 12.6 04/12/2018     Lab Results   Component Value Date    HCT 39.6 04/12/2018     No components found for: MCT  Lab Results   Component Value Date    MCV 95 04/12/2018     Lab Results   Component Value Date    MCH 30.1 04/12/2018     Lab Results   Component Value Date    MCHC 31.8 04/12/2018     Lab Results   Component Value Date    RDW 13.0 04/12/2018     Lab Results   Component Value Date     04/12/2018     Follow-up Plan: The current medical regimen is effective;  continue present plan and medications.        Patient Instructions:  none    I spent 25 min spent in direct face to face time with Alka Mcfadden, greater than 50% in counseling and coordination of care for:  Chronic pain medication management, anxiety, Thyroid Cancer sequelae, Screening for Hep C and HIV, and management of high risk medications.     The information in this document, created by the medical scribe, Harrison Mcgowan, for me, accurately reflects the services I personally performed and the decisions made by me. I have reviewed and approved this document for accuracy prior to leaving the patient care area.    BOBBY Butcher [facility] PRIMARY [facility]

## 2018-05-30 NOTE — LETTER
July 3, 2018      Alka Mcfadden  [address] [place] [address]        Dear ,    We are writing to inform you of your test results.    Your test results fall within the expected range(s) or remain unchanged from previous results.  Please continue with current treatment plan.    Resulted Orders   Hepatitis C Screen Reflex to HCV RNA Quant and Genotype   Result Value Ref Range    Hepatitis C Antibody Nonreactive NR^Nonreactive      Comment:      Assay performance characteristics have not been established for newborns,   infants, and children     HIV Screening   Result Value Ref Range    HIV Antigen Antibody Combo Nonreactive NR^Nonreactive          Comment:      HIV-1 p24 Ag & HIV-1/HIV-2 Ab Not Detected       If you have any questions or concerns, please call the clinic at the number listed above.       Sincerely,        BOBBY Butcher CNP

## 2018-05-31 LAB
HCV AB SERPL QL IA: NONREACTIVE
HIV 1+2 AB+HIV1 P24 AG SERPL QL IA: NONREACTIVE

## 2018-06-29 ENCOUNTER — HOSPITAL ENCOUNTER (OUTPATIENT)
Dept: GENERAL RADIOLOGY | Facility: CLINIC | Age: 58
Discharge: HOME | End: 2018-06-29
Attending: NURSE PRACTITIONER | Admitting: NURSE PRACTITIONER
Payer: MEDICARE

## 2018-06-29 ENCOUNTER — OFFICE VISIT (OUTPATIENT)
Dept: FAMILY MEDICINE | Facility: CLINIC | Age: 58
End: 2018-06-29
Payer: COMMERCIAL

## 2018-06-29 ENCOUNTER — OFFICE VISIT (OUTPATIENT)
Dept: BEHAVIORAL HEALTH | Facility: CLINIC | Age: 58
End: 2018-06-29
Payer: COMMERCIAL

## 2018-06-29 VITALS
HEART RATE: 84 BPM | RESPIRATION RATE: 14 BRPM | OXYGEN SATURATION: 92 % | DIASTOLIC BLOOD PRESSURE: 68 MMHG | BODY MASS INDEX: 19.02 KG/M2 | WEIGHT: 114.31 LBS | SYSTOLIC BLOOD PRESSURE: 106 MMHG | TEMPERATURE: 98.7 F

## 2018-06-29 DIAGNOSIS — G57.72 COMPLEX REGIONAL PAIN SYNDROME TYPE 2 OF LEFT LOWER EXTREMITY: Primary | ICD-10-CM

## 2018-06-29 DIAGNOSIS — R06.02 SHORTNESS OF BREATH: ICD-10-CM

## 2018-06-29 DIAGNOSIS — C73 MALIGNANT NEOPLASM OF THYROID GLAND (H): ICD-10-CM

## 2018-06-29 DIAGNOSIS — G89.4 CHRONIC PAIN SYNDROME: ICD-10-CM

## 2018-06-29 DIAGNOSIS — R11.0 NAUSEA: ICD-10-CM

## 2018-06-29 DIAGNOSIS — K21.9 GASTROESOPHAGEAL REFLUX DISEASE WITHOUT ESOPHAGITIS: ICD-10-CM

## 2018-06-29 DIAGNOSIS — F41.9 ANXIETY: ICD-10-CM

## 2018-06-29 DIAGNOSIS — F41.9 ANXIETY: Primary | ICD-10-CM

## 2018-06-29 LAB
ALBUMIN UR-MCNC: NEGATIVE MG/DL
APPEARANCE UR: CLEAR
BILIRUB UR QL STRIP: NEGATIVE
COLOR UR AUTO: YELLOW
GLUCOSE UR STRIP-MCNC: NEGATIVE MG/DL
HGB UR QL STRIP: NEGATIVE
KETONES UR STRIP-MCNC: NEGATIVE MG/DL
LEUKOCYTE ESTERASE UR QL STRIP: NEGATIVE
NITRATE UR QL: NEGATIVE
PH UR STRIP: 6 PH (ref 5–7)
SOURCE: NORMAL
SP GR UR STRIP: 1.01 (ref 1–1.03)
UROBILINOGEN UR STRIP-ACNC: 0.2 EU/DL (ref 0.2–1)

## 2018-06-29 PROCEDURE — 90832 PSYTX W PT 30 MINUTES: CPT | Performed by: SOCIAL WORKER

## 2018-06-29 PROCEDURE — 71046 X-RAY EXAM CHEST 2 VIEWS: CPT

## 2018-06-29 PROCEDURE — 81003 URINALYSIS AUTO W/O SCOPE: CPT | Performed by: NURSE PRACTITIONER

## 2018-06-29 PROCEDURE — 99215 OFFICE O/P EST HI 40 MIN: CPT | Performed by: NURSE PRACTITIONER

## 2018-06-29 RX ORDER — DIAZEPAM 5 MG
2.5-5 TABLET ORAL EVERY 12 HOURS PRN
Qty: 60 TABLET | Refills: 0 | Status: SHIPPED | OUTPATIENT
Start: 2018-06-29

## 2018-06-29 RX ORDER — OXYCODONE AND ACETAMINOPHEN 5; 325 MG/1; MG/1
1-2 TABLET ORAL EVERY 6 HOURS PRN
Qty: 180 TABLET | Refills: 0 | Status: SHIPPED | OUTPATIENT
Start: 2018-06-29 | End: 2018-07-25

## 2018-06-29 ASSESSMENT — ANXIETY QUESTIONNAIRES
7. FEELING AFRAID AS IF SOMETHING AWFUL MIGHT HAPPEN: NOT AT ALL
5. BEING SO RESTLESS THAT IT IS HARD TO SIT STILL: NOT AT ALL
IF YOU CHECKED OFF ANY PROBLEMS ON THIS QUESTIONNAIRE, HOW DIFFICULT HAVE THESE PROBLEMS MADE IT FOR YOU TO DO YOUR WORK, TAKE CARE OF THINGS AT HOME, OR GET ALONG WITH OTHER PEOPLE: SOMEWHAT DIFFICULT
2. NOT BEING ABLE TO STOP OR CONTROL WORRYING: NOT AT ALL
1. FEELING NERVOUS, ANXIOUS, OR ON EDGE: SEVERAL DAYS
GAD7 TOTAL SCORE: 3
3. WORRYING TOO MUCH ABOUT DIFFERENT THINGS: SEVERAL DAYS
6. BECOMING EASILY ANNOYED OR IRRITABLE: NOT AT ALL

## 2018-06-29 ASSESSMENT — PATIENT HEALTH QUESTIONNAIRE - PHQ9: 5. POOR APPETITE OR OVEREATING: SEVERAL DAYS

## 2018-06-29 NOTE — PROGRESS NOTES
[facility] - Integrated Primary Care   June 29, 2018      Behavioral Health Clinician Progress Note    Patient Name: Alka Mcfadden           Service Type: Individual      Service Location:   Face to Face in Clinic     Session Start Time: 3:25pm  Session End Time: 3:40pm      Session Length: 16 - 37      Attendees: Patient and Spouse / Significant Other    Visit Activities (Refresh list every visit): [facility]    Diagnostic Assessment Date: not completed yet  Treatment Plan Review Date: not completed yet  See Flowsheets for today's PHQ-9 and JAMAL-7 results  Previous PHQ-9:   PHQ-9 SCORE 12/8/2015 7/15/2016 12/5/2016   Total Score - - -   Total Score - - -   Total Score 0 0 0     Previous JAMAL-7:   JAMAL-7 SCORE 12/8/2015 7/15/2016 12/5/2016   Total Score - - -   Total Score 0 0 0   Total Score - - -       KENDRA LEVEL:  No flowsheet data found.    DATA  Extended Session (60+ minutes): No  Interactive Complexity: No  Crisis: No    Treatment Objective(s) Addressed in This Session:  Target Behavior(s): disease management/lifestyle changes mental health    Depressed Mood: Increase interest, engagement, and pleasure in doing things  Decrease frequency and intensity of feeling down, depressed, hopeless  Identify negative self-talk and behaviors: challenge core beliefs, myths, and actions  Improve concentration, focus, and mindfulness in daily activities   Anxiety: will experience a reduction in anxiety, will develop more effective coping skills to manage anxiety symptoms, will develop healthy cognitive patterns and beliefs and will increase ability to function adaptively  Psychological distress related to Pain  Psychological distress related to Chronic Disease Management    Current Stressors / Issues:  The patient was seen by [facility] per the request of the PCP.  She reported having lower appetite and she has lost about 1.5 pounds since the last visit-she had discussion of some health symptoms with her gut with PCP and agreed  to treatment for the symptoms-better sleep and she is still waking during the night-regarding mood the patient reported-no depression-they are in the process of moving to [place]-no suicidal thoughts-regarding anxiety manageable and realistic worries-      Progress on Treatment Objective(s) / Homework:  Minimal progress - ACTION (Actively working towards change); Intervened by reinforcing change plan / affirming steps taken    Motivational Interviewing    MI Intervention: Expressed Empathy/Understanding, Supported Autonomy, Collaboration, Evocation, Permission to raise concern or advise, Open-ended questions, Reflections: simple and complex, Rolled with resistance: Simple reflection, Complex reflection and Evoked patient agenda and Change talk (evoked)     Change Talk Expressed by the Patient: Desire to change Ability to change Reasons to change Need to change Committment to change Activation Taking steps    Provider Response to Change Talk: E - Evoked more info from patient about behavior change, A - Affirmed patient's thoughts, decisions, or attempts at behavior change, R - Reflected patient's change talk and S - Summarized patient's change talk statements      Care Plan review completed: No    Medication Review:  No changes to current psychiatric medication(s)    Medication Compliance:  NA    Changes in Health Issues:   Yes: Pain, Associated Psychological Distress  Chronic disease management, Associated Psychological Distress    Chemical Use Review:   Substance Use: Chemical use reviewed, no active concerns identified      Tobacco Use: No current tobacco use.      Assessment: Current Emotional / Mental Status (status of significant symptoms):  Risk status (Self / Other harm or suicidal ideation)  Patient denies a history of suicidal ideation, suicide attempts, self-injurious behavior, homicidal ideation, homicidal behavior and and other safety concerns  Patient denies current fears or concerns for personal  safety.  Patient denies current or recent suicidal ideation or behaviors.  Patient denies current or recent homicidal ideation or behaviors.  Patient denies current or recent self injurious behavior or ideation.  Patient denies other safety concerns.  A safety and risk management plan has not been developed at this time, however patient was encouraged to call [place] [phone] should there be a change in any of these risk factors.    Appearance:   Appropriate   Eye Contact:   Good   Psychomotor Behavior: Normal   Attitude:   Cooperative   Orientation:   All  Speech   Rate / Production: Normal    Volume:  Normal   Mood:    Normal  Affect:    Appropriate   Thought Content:  Clear   Thought Form:  Coherent  Goal Directed  Logical   Insight:    Good     Diagnoses:  1. Anxiety        Collateral Reports Completed:  Not Applicable    Plan: (Homework, other):  Patient was given information about behavioral services and encouraged to schedule a follow up appointment with the clinic [facility] as needed.  She was also given information about mental health symptoms and treatment options .  CD Recommendations: No indications of CD issues.  CHERY Benedict, [facility]      ______________________________________________________________________

## 2018-06-29 NOTE — LETTER
July 2, 2018      Alka Mcfadden  [address] [place] [address]        Dear ,    We are writing to inform you of your test results.    Your test results fall within the expected range(s) or remain unchanged from previous results.  Please continue with current treatment plan.    Resulted Orders   UA reflex to Microscopic and Culture   Result Value Ref Range    Color Urine Yellow     Appearance Urine Clear     Glucose Urine Negative NEG^Negative mg/dL    Bilirubin Urine Negative NEG^Negative    Ketones Urine Negative NEG^Negative mg/dL    Specific Gravity Urine 1.010 1.003 - 1.035    Blood Urine Negative NEG^Negative    pH Urine 6.0 5.0 - 7.0 pH    Protein Albumin Urine Negative NEG^Negative mg/dL    Urobilinogen Urine 0.2 0.2 - 1.0 EU/dL    Nitrite Urine Negative NEG^Negative    Leukocyte Esterase Urine Negative NEG^Negative    Source Midstream Urine        If you have any questions or concerns, please call the clinic at the number listed above.       Sincerely,        BOBBY Butcher CNP

## 2018-06-29 NOTE — MR AVS SNAPSHOT
"              After Visit Summary   6/29/2018    Alka Mcfadden    MRN: [id number]           Patient Information     Date Of Birth          1960        Visit Information        Provider Department      6/29/2018 3:00 PM Shanda Vo APRN CNP [facility]        Today's Diagnoses     Chronic pain syndrome    -  1    Nausea        Malignant neoplasm of thyroid gland (H)        Complex regional pain syndrome type 2 of left lower extremity        Gastroesophageal reflux disease without esophagitis        Shortness of breath           Follow-ups after your visit        Future tests that were ordered for you today     Open Future Orders        Priority Expected Expires Ordered    XR Chest 2 Views Routine 6/29/2018 6/29/2019 6/29/2018            Who to contact     If you have questions or need follow up information about today's clinic visit or your schedule please contact [facility] directly at [phone].  Normal or non-critical lab and imaging results will be communicated to you by dscouthart, letter or phone within 4 business days after the clinic has received the results. If you do not hear from us within 7 days, please contact the clinic through dscouthart or phone. If you have a critical or abnormal lab result, we will notify you by phone as soon as possible.  Submit refill requests through [employer] or call your pharmacy and they will forward the refill request to us. Please allow 3 business days for your refill to be completed.          Additional Information About Your Visit        MyChart Information     [employer] lets you send messages to your doctor, view your test results, renew your prescriptions, schedule appointments and more. To sign up, go to [email or web address] . Click on \"Log in\" on the left side of the screen, which will take you to the Welcome page. Then click on \"Sign up Now\" on the right side of the page.     You will be asked to " enter the access code listed below, as well as some personal information. Please follow the directions to create your username and password.     Your access code is: [email or web address]  Expires: 2018  1:25 PM     Your access code will  in 90 days. If you need help or a new code, please call your [facility] clinic or [phone].        Care EveryWhere ID     This is your Care EveryWhere ID. This could be used by other organizations to access your [facility] medical records  [id number]        Your Vitals Were     Pulse Temperature Respirations Pulse Oximetry Breastfeeding? BMI (Body Mass Index)    84 98.7  F (37.1  C) (Temporal) 14 92% No 19.02 kg/m2       Blood Pressure from Last 3 Encounters:   18 106/68   18 104/62   18 102/64    Weight from Last 3 Encounters:   18 114 lb 5 oz (51.9 kg)   18 116 lb (52.6 kg)   18 118 lb (53.5 kg)              We Performed the Following     UA reflex to Microscopic and Culture          Today's Medication Changes          These changes are accurate as of 18  3:46 PM.  If you have any questions, ask your nurse or doctor.               Start taking these medicines.        Dose/Directions    ranitidine 150 MG tablet   Commonly known as:  ZANTAC   Used for:  Gastroesophageal reflux disease without esophagitis   Started by:  Shanda Vo APRN CNP        Dose:  150 mg   Take 1 tablet (150 mg) by mouth 2 times daily   Quantity:  60 tablet   Refills:  1         Stop taking these medicines if you haven't already. Please contact your care team if you have questions.     omeprazole 40 MG capsule   Commonly known as:  priLOSEC   Stopped by:  Shanda Vo APRN CNP                Where to get your medicines      These medications were sent to [facility] [address] [place] [address] [address] [facility]  [address] [facility] [address]     Phone:  [phone]     ranitidine 150 MG tablet         Some of these will need a paper  prescription and others can be bought over the counter.  Ask your nurse if you have questions.     Bring a paper prescription for each of these medications     oxyCODONE-acetaminophen 5-325 MG per tablet               Information about OPIOIDS     PRESCRIPTION OPIOIDS: WHAT YOU NEED TO KNOW   We gave you an opioid (narcotic) pain medicine. It is important to manage your pain, but opioids are not always the best choice. You should first try all the other options your care team gave you. Take this medicine for as short a time (and as few doses) as possible.     These medicines have risks:    DO NOT drive when on new or higher doses of pain medicine. These medicines can affect your alertness and reaction times, and you could be arrested for driving under the influence (DUI). If you need to use opioids long-term, talk to your care team about driving.    DO NOT operate heave machinery    DO NOT do any other dangerous activities while taking these medicines.     DO NOT drink any alcohol while taking these medicines.      If the opioid prescribed includes acetaminophen, DO NOT take with any other medicines that contain acetaminophen. Read all labels carefully. Look for the word  acetaminophen  or  Tylenol.  Ask your pharmacist if you have questions or are unsure.    You can get addicted to pain medicines, especially if you have a history of addiction (chemical, alcohol or substance dependence). Talk to your care team about ways to reduce this risk.    Store your pills in a secure place, locked if possible. We will not replace any lost or stolen medicine. If you don t finish your medicine, please throw away (dispose) as directed by your pharmacist. The [place] [employer]ution [employer] has more information about safe disposal: https://www.pca.[facility].[place].us/living-green/managing-unwanted-medications.     All opioids tend to cause constipation. Drink plenty of water and eat foods that have a lot of fiber, such as fruits,  vegetables, prune juice, apple juice and high-fiber cereal. Take a laxative (Miralax, milk of magnesia, Colace, Senna) if you don t move your bowels at least every other day.          Primary Care Provider Office Phone # Fax #    BOBBY Balbuena [phone]       [address] [facility] [address]  [facility] [address]        Equal Access to Services     PAULIE MARQUEZ : Hadii aad ku hadasho Soomaali, waaxda luqadaha, qaybta kaalmada adeegyada, waxay idiin hayaan adeeg kharash lacaryl . So [facility] [phone].    ATENCIÓN: Si habla español, tiene a olmstead disposición servicios gratuitos de asistencia lingüística. Erendiraame al [phone].    We comply with applicable federal civil rights laws and [place] laws. We do not discriminate on the basis of race, color, national origin, age, disability, sex, sexual orientation, or gender identity.            Thank you!     Thank you for choosing [facility]  for your care. Our goal is always to provide you with excellent care. Hearing back from our patients is one way we can continue to improve our services. Please take a few minutes to complete the written survey that you may receive in the mail after your visit with us. Thank you!             Your Updated Medication List - Protect others around you: Learn how to safely use, store and throw away your medicines at www.disposemymeds.org.          This list is accurate as of 6/29/18  3:46 PM.  Always use your most recent med list.                   Brand Name Dispense Instructions for use Diagnosis    ADVAIR DISKUS 250-50 MCG/DOSE diskus inhaler   Generic drug:  fluticasone-salmeterol      INHALE 1 PUFF PO Q 12 HOURS .TK ON A SCHEDULED BASIS FOR COPD    Chronic obstructive pulmonary disease with acute lower respiratory infection (H)       albuterol 108 (90 Base) MCG/ACT Inhaler    PROAIR HFA/PROVENTIL HFA/VENTOLIN HFA    1 Inhaler    Inhale 2 puffs into the lungs every 4 hours as needed for  shortness of breath / dyspnea or wheezing    Chronic obstructive pulmonary disease with acute lower respiratory infection (H)       diazepam 5 MG tablet    VALIUM    60 tablet    Take 0.5-1 tablets (2.5-5 mg) by mouth every 12 hours as needed for anxiety or sleep    Anxiety, Chronic pain syndrome       SANDRA PO           mirtazapine 30 MG tablet    REMERON    30 tablet    Take 1 tablet (30 mg) by mouth At Bedtime    Recurrent major depressive disorder, in partial remission (H)       naloxone nasal spray    NARCAN    2 each    Spray 1 spray (4 mg) in nostril as needed for opioid reversal    Encounter for long-term use of opiate analgesic       ondansetron 4 MG tablet    ZOFRAN    40 tablet    Take 1 tablet (4 mg) by mouth every 8 hours as needed for nausea    Nausea       oxyCODONE-acetaminophen 5-325 MG per tablet    PERCOCET    180 tablet    Take 1-2 tablets by mouth every 6 hours as needed for pain maximum 6 tablet(s) per day    Chronic pain syndrome, Malignant neoplasm of thyroid gland (H), Complex regional pain syndrome type 2 of left lower extremity       ranitidine 150 MG tablet    ZANTAC    60 tablet    Take 1 tablet (150 mg) by mouth 2 times daily    Gastroesophageal reflux disease without esophagitis       senna-docusate 8.6-50 MG per tablet    SENEXON-S    120 tablet    Take 2 tablets by mouth 2 times daily as needed for constipation    Encounter for long-term use of opiate analgesic       Tangerine Oil           temazepam 30 MG capsule    RESTORIL    30 capsule    TAKE ONE CAPSULE (30MG) BY MOUTH NIGHTLY AS NEEDED FOR SLEEP    Insomnia, unspecified type, Chronic pain syndrome

## 2018-06-29 NOTE — PROGRESS NOTES
SUBJECTIVE:                                                    Alka Mcfadden is a 57 year old female who presents to clinic today for the following health issues:  [facility]: [place]     Chronic Pain Follow-Up:    No increase in pain medication use, remains at 4 tabs of percocet in 24 hours. Engaging in exercises, stretching, and resting as instructed in past connections to physical therapy.  Has a prescription for Medical Marijuana and has found this somewhat helpful.  No longer using the marijuana inhaler, has transitioned to sublingual oil.  Finds the medical marijuana a helpful part of her over all pain management plan.        Type / Location of Pain: Lt Leg  Analgesia/pain control:       Recent changes:  same      Overall control: Inadequate pain control, periods of control  Activity level/function:      Daily activities:  Unable to perform most daily activities - chores, hobbies, social activities, driving    Work:  Unable to work  Adverse effects:  No  Adherance    Taking medication as directed?  Yes    Participating in other treatments: yes, Streching  Risk Factors:    Sleep:  Poor    Mood/anxiety:  improved    Recent family or social stressors:  none noted    Other aggravating factors: prolonged sitting, prolonged standing and poor posture  PHQ-9 SCORE 12/8/2015 7/15/2016 12/5/2016   Total Score - - -   Total Score - - -   Total Score 0 0 0     JAMAL-7 SCORE 7/15/2016 12/5/2016 6/29/2018   Total Score - - -   Total Score 0 0 3   Total Score - - -     Encounter-Level CSA - 04/28/2015:          Controlled Substance Agreement - Scan on 5/1/2015 12:55 PM : Controlled Substance Agreement 04/28/15 (below)            Encounter-Level CSA - 04/28/2015:          Controlled Substance Agreement - Scan on 1/23/2015  9:22 AM : Controlled Medication Agreement 01/05/15 (below)                Amount of exercise or physical activity: 6-7 days/week for an average of less than 15 minutes    Problems taking medications regularly:  No    Medication side effects: none    Diet: gluten-free/reduced    Respiratory: Cough and secretions,  Follow-Up    Symptoms are currently: slightly worsened    Current fatigue or dyspnea with ambulation: worsened from baseline    Shortness of breath: slightly worsened    Increased or change in Cough/Sputum: Yes-  Increased secretions, coughing up yellow white secretions daily    Fever(s): No    Baseline ambulation without stopping to rest:  40 feet Able to walk up 1/2  flight of stairs without stopping to rest.    Any ER/UC or hospital admissions since your last visit? No     History   Smoking Status     Light Tobacco Smoker     Types: Cigarettes   Smokeless Tobacco     Never Used     Comment: 3-4 cigarettes per week.     No results found for: FEV1, GPM5[facility]U.      Mental Health Follow up Depression/Anxiety    Status since last visit: stable    See PHQ-9 for current symptoms.  Other associated symptoms: None    Complicating factors: chronic pain  Significant life event:  No, planning to move to [place].   Current substance abuse:  None  Anxiety or Panic symptoms:  No    Sleep - improved in past 3 months  Appetite - declined, small weight loss of 1.5 lbs  Exercise - limited to PT and stretching    Smoking - yes, 1/4 ppd  Alcohol - no  Street drugs - no  Marijuana - yes, medical marijuana  Caffeine - yes    PHQ-9  English PHQ-9   Any Language           Abdominal Pain  Rubio thinks she needs to have a cholecystectomy and that her gallbladder may be contributing to her abdominal discomfort. They saw GI a year ago and inquired at the [facility], but no one seems to think she needs a cholecystectomy. They have not met with a surgeon. Alka comments she felt more comfortable when she was angled in the hospital bed.     Duration: 2 1/2 years maybe 3    Description (location/character/radiation): where gallstone is located, right upper quad       Associated flank pain: None    Intensity:  8/10, for several minutes at a time.      Skin Moles  Her  would like provider to examine a mole on her back and abdomen. Relates that it does bother here. Would like to see dermatology.  2 lesions 1) on her face measures 0.6 cm diameter on left cheek, and 1.4 cm on left upper quad abdomen.  Has been treating these lesions with CBD oil as recommended by her daughter.  May have noticed a small change.  Was very hopeful last month, the enthusiasm as faded a bit. Did not make an appointment to see Dermatology to date.       Problems taking medications regularly: No    Medication side effects: none    Diet: regular (no restrictions)    Social History   Substance Use Topics     Smoking status: Light Tobacco Smoker     Types: Cigarettes     Smokeless tobacco: Never Used      Comment: 3-4 cigarettes per week.     Alcohol use No        Problem list and histories reviewed & adjusted, as indicated.  Additional history: as documented    Patient Active Problem List   Diagnosis     Encounter for long-term use of opiate analgesic     Hyperalgesia      ### Cataract, OU     Dry eyes     Hyperlipidemia LDL goal <130     Constipation     Insomnia     Tobacco use disorder     Anxiety     Chronic pain syndrome     Suicidal ideation     Nausea     Pancreatic atrophy     Haney's esophagus determined by biopsy     Chronic obstructive pulmonary disease (H) dx at [place]     Malignant neoplasm of thyroid gland (H)     Complex regional pain syndrome type 2 of left lower extremity     Ankylosing spondylitis of multiple sites in spine (H)     Calculus of gallbladder without cholecystitis     Gastroesophageal reflux disease without esophagitis     Past Surgical History:   Procedure Laterality Date     ARTHROPLASTY REVISION HIP  2005, 2015     BACK SURGERY  1985/1990/1996     ENDOSCOPIC ULTRASOUND UPPER GASTROINTESTINAL TRACT (GI) N/A 1/5/2017    Procedure: ENDOSCOPIC ULTRASOUND, ESOPHAGOSCOPY / UPPER GASTROINTESTINAL TRACT (GI);  Surgeon: Esteban Mortensen MD;  Location:  [facility] OR     ESOPHAGOSCOPY, GASTROSCOPY, DUODENOSCOPY (EGD), COMBINED N/A 1/5/2017    Procedure: COMBINED ESOPHAGOSCOPY, GASTROSCOPY, DUODENOSCOPY (EGD);  Surgeon: Esteban Mortensen MD;  Location: [facility] OR     JOINT REPLACEMENT  1985       Social History   Substance Use Topics     Smoking status: Light Tobacco Smoker     Types: Cigarettes     Smokeless tobacco: Never Used      Comment: 3-4 cigarettes per week.     Alcohol use No     No family history on file.        Current Outpatient Prescriptions   Medication Sig Dispense Refill     ADVAIR DISKUS 250-50 MCG/DOSE diskus inhaler INHALE 1 PUFF PO Q 12 HOURS .TK ON A SCHEDULED BASIS FOR COPD  0     albuterol (PROAIR HFA/PROVENTIL HFA/VENTOLIN HFA) 108 (90 BASE) MCG/ACT Inhaler Inhale 2 puffs into the lungs every 4 hours as needed for shortness of breath / dyspnea or wheezing 1 Inhaler 3     diazepam (VALIUM) 5 MG tablet Take 0.5-1 tablets (2.5-5 mg) by mouth every 12 hours as needed for anxiety or sleep 60 tablet 0     mirtazapine (REMERON) 30 MG tablet Take 1 tablet (30 mg) by mouth At Bedtime 30 tablet 11     naloxone (NARCAN) nasal spray Spray 1 spray (4 mg) in nostril as needed for opioid reversal 2 each 1     Norethindrone (SANDRA PO)        ondansetron (ZOFRAN) 4 MG tablet Take 1 tablet (4 mg) by mouth every 8 hours as needed for nausea 40 tablet 0     oxyCODONE-acetaminophen (PERCOCET) 5-325 MG per tablet Take 1-2 tablets by mouth every 6 hours as needed for pain maximum 6 tablet(s) per day 180 tablet 0     ranitidine (ZANTAC) 150 MG tablet Take 1 tablet (150 mg) by mouth 2 times daily 60 tablet 1     senna-docusate (SENEXON-S) 8.6-50 MG per tablet Take 2 tablets by mouth 2 times daily as needed for constipation 120 tablet 0     Tangerine OIL        temazepam (RESTORIL) 30 MG capsule TAKE ONE CAPSULE (30MG) BY MOUTH NIGHTLY AS NEEDED FOR SLEEP 30 capsule 0     Allergies   Allergen Reactions     Amoxicillin-Pot Clavulanate Rash     Augmentin Rash      Amitriptyline      Had reaction while on this and vistaril; was just not herself.     Hydroxyzine Other (See Comments)     Had reaction when took this along with amitriptyline.     Vistaril      Had reaction when took this along with amitriptyline.     Recent Labs   Lab Test  04/12/18   1142  02/02/17   1657  01/05/17   0904   12/01/16   0937  10/09/14   LDL   --    --    --    --    --    --   126   HDL   --    --    --    --    --    --   39   TRIG   --    --    --    --    --    --   137   ALT   --   30  16   --   15   < >   --    CR   --   0.75  0.64   < >  0.68   < >   --    GFRESTIMATED   --   79  >90  Non  GFR Calc     < >  89   < >   --    GFRESTBLACK   --   >90   GFR Calc    >90   GFR Calc     < >  >90   GFR Calc     < >   --    POTASSIUM   --   4.4  4.2   < >  3.7   < >   --    TSH  1.85  0.70   --    < >   --    < >   --     < > = values in this interval not displayed.      BP Readings from Last 3 Encounters:   06/29/18 106/68   05/30/18 104/62   04/12/18 102/64    Wt Readings from Last 3 Encounters:   06/29/18 114 lb 5 oz (51.9 kg)   05/30/18 116 lb (52.6 kg)   04/12/18 118 lb (53.5 kg)        Labs reviewed in EPIC  Problem list, Medication list, Allergies, and Medical/Social/Surgical histories reviewed in [facility] and updated as appropriate.     ROS: Constitutional, neuro, ENT, endocrine, pulmonary, cardiac, gastrointestinal, genitourinary, musculoskeletal, integument and psychiatric systems are negative, except as otherwise noted above in the HPI.       OBJECTIVE:                                                    /68  Pulse 84  Temp 98.7  F (37.1  C) (Temporal)  Resp 14  Wt 114 lb 5 oz (51.9 kg)  SpO2 92%  Breastfeeding? No  BMI 19.02 kg/m2  Body mass index is 19.02 kg/(m^2).  GENERAL: Thin, alert, well nourished, well hydrated  EYES: Eyes grossly normal to inspection, extraocular movements - intact, and PERRL  HENT: ear canals-  L slightly retracted, right WNL;  Nose- normal; Mouth- no ulcers, no lesions  NECK: mild tenderness to palpation, reduced range of motion, no adenopathy, no asymmetry, no masses, no stiffness; thyroid- surgically removed.  RESP: lungs upper airway with faint wheezing, fine crackles in left base to auscultation - no rales, no rhonchi, no wheezes  CV: regular rates and rhythm, normal S1 S2, no S3 or S4 and no murmur,  ABDOMEN: flat, hypoactive BS, no tenderness, no  hepatosplenomegaly, no masses,   MS: extremities- no gross deformities noted, no edema  SKIN: 2 lesions:  1.4 cm diameter, raised poorly circumscribed, brown irregular surface on left upper quad of abdomen, One on upper left check 0.6 cm diameter   NEURO: strength and tone- normal, sensory exam- grossly normal, mentation- intact, speech- normal,   Non focal no aphasia. No facial asymmetry.   BACK: surgical hardware outline visible.  no CVA tenderness, no paralumbar tenderness  Mental Status Assessment:  Appearance:   Appropriate   Eye Contact:   Good   Psychomotor Behavior: Normal   Attitude:   Cooperative   Orientation:   All  Speech   Rate / Production: Normal    Volume:  Normal   Mood:    Normal  Affect:    Appropriate  Bright   Thought Content:  Clear   Thought Form:  Coherent  Circumstantial  Insight:    Good   Attention Span and Concentration:  limited  Recent and Remote Memory:  intact  Fund of Knowledge: appropriate  Muscle Strength and Tone: normal   Suicidal Ideation: reports no thoughts, no intention  Hallucination: No  Paranoid-No  Manic-No  Panic-No  Self harm-No      See [facility] notes [place]    Diagnostic Test Results:  Results for orders placed or performed in visit on 06/29/18   UA reflex to Microscopic and Culture   Result Value Ref Range    Color Urine Yellow     Appearance Urine Clear     Glucose Urine Negative NEG^Negative mg/dL    Bilirubin Urine Negative NEG^Negative    Ketones Urine Negative NEG^Negative mg/dL    Specific Gravity  Urine 1.010 1.003 - 1.035    Blood Urine Negative NEG^Negative    pH Urine 6.0 5.0 - 7.0 pH    Protein Albumin Urine Negative NEG^Negative mg/dL    Urobilinogen Urine 0.2 0.2 - 1.0 EU/dL    Nitrite Urine Negative NEG^Negative    Leukocyte Esterase Urine Negative NEG^Negative    Source Midstream Urine         ASSESSMENT/PLAN:                                                    (G57.72) Complex regional pain syndrome type 2 of left lower extremity  (primary encounter diagnosis)  Comment: No increase in dose or frequency of use of percocet with addition of medical marijuana  Plan: oxyCODONE-acetaminophen (PERCOCET) 5-325 MG per        tablet        Discussed threshold of narcotic dosing recommended by CDC as this time. Will try to hold to that threshold at this time.     (G89.4) Chronic pain syndrome  Comment: Long term use of narcotics for pain for greater than 10 years.  Has been evaluated by pain medicine and at [facility]. No new therapy or recommendations at this time untried.   Plan: oxyCODONE-acetaminophen (PERCOCET) 5-325 MG per        tablet, diazepam (VALIUM) 5 MG tabletDiscussed threshold of narcotic dosing recommended by CDC as this time. Will try to hold to that threshold at this time.          (R06.02) Shortness of breath  Comment: Increased symptoms of respiratory compromise with increased cough and sputum production.  No fever.   Plan: XR Chest 2 Views        Will call with results that require further intervention, otherwise will send a letter.  No antibiotics prescribed at this time.     (R11.0) Nausea  Comment: History of nausea and vomting with onset of UTI would like to be evaluated for UTI  Plan: UA reflex to Microscopic and Culture, XR Chest         2 Views        Will call with results that require further intervention, otherwise will send a letter.      (C73) Malignant neoplasm of thyroid gland (H)  Comment: Thyroid surgically removed at [facility].   Plan: oxyCODONE-acetaminophen (PERCOCET)  5-325 MG per        tablet        Follow up with Synthroid and lab management.     (K21.9) Gastroesophageal reflux disease without esophagitis  Comment: Concern that there may be negative consequence of taking omeprazole long term on bone health.   Plan: ranitidine (ZANTAC) 150 MG tablet        Would like to switch to ranitidine for GERD symptoms    (F41.9) Anxiety  Comment: Mild symptoms well managed with diazepam.   Plan: diazepam (VALIUM) 5 MG tablet        Discussed contraindication of narcotics and benzodiazepines.  No adverse effects in the past 10 years of responsible use would like to continue to use diazepam for anxiety.         Patient Instructions:  Patient Instructions     Chronic Pain  Pain serves an important role. It lets you know something is wrong that needs your attention. When the body heals, pain normally goes away.  When pain lasts longer than 6 months, it is called  chronic  pain. This is pain that is present even after the body has healed. Chronic pain can cause mood problems and get in the way of your relationships and your daily life.  A number of conditions can cause chronic pain. Some of the more common include:    Previous surgery    An old injury    Infection    Diseases such as diabetes    Nerve damage    Back injury    Arthritis    Migraine or other headaches    Fibromyalgia    Cancer  Depression and stress can make chronic pain symptoms worse. In some cases, a cause for the pain can't be found.   Treatment  Treatment can greatly reduce pain. In many cases, pain can become less severe, occur less often, and interfere less with your daily life. Chronic pain is often treated with a combination of medicines, therapies, and lifestyle changes. You will work closely with your healthcare provider to find a treatment plan that works best for you.    Ask your healthcare provider for a referral to a pain management specialty center. These can provide the most recent and proven pain management  strategies, along with emotional support and comprehensive services.    Several different types of medicines may be prescribed for chronic pain. Work with your healthcare provider to develop a medicine plan that helps manage your pain.    Physical therapy can help reduce certain types of chronic pain.    Occupational therapy teaches you how to do routine tasks of daily living in ways that lessen your discomfort.    Counseling can help you cope better with stress and pain.    Other therapies such as meditation, yoga, biofeedback, massage, and acupuncture can also help manage chronic pain.    Changing certain habits can help reduce chronic pain. They include:  ? Eating healthy  ? Developing an exercise routine  ? Getting enough sleep   ? Stopping smoking and limiting alcohol use  ? Losing excess weight  Follow-up care  Follow up with your healthcare provider, or as advised. Let your healthcare provider know if your current treatment plan is working or if changes are needed.  Resources  For more information, contact:    American Headache and Migraine Association, vito.[employer]am[employer] or 116-900-4426    American Chronic Pain Association, theacpa.org or 643-849-8880  Date Last Reviewed: 8/1/2017 2000-2017 [employer]. [address] [place] [address]. All rights reserved. This information is not intended as a substitute for professional medical care. Always follow your healthcare professional's instructions.                .I spent Greater than 40 minutes was spent face to face with Alka Mcfadden, with greater than 50 % in counselling and consultation about Complex regional pain syndrome along with Chronic pain, SOB, nausea, hx of thyroid cancer, anxiety, management of high risk medications.     BOBBY [facility]

## 2018-06-29 NOTE — MR AVS SNAPSHOT
"              After Visit Summary   6/29/2018    Alka Mcfadden    MRN: [id number]           Patient Information     Date Of Birth          1960        Visit Information        Provider Department      6/29/2018 3:00 PM [facility]        Today's Diagnoses     Anxiety    -  1       Follow-ups after your visit        Your next 10 appointments already scheduled     Jul 25, 2018  3:00 PM CDT   Return Visit with BOBBY Balbuena CNP   [facility] ([facility])    [address]  [address]  [facility] [id number] [phone]            Jul 25, 2018  3:00 PM CDT   Return Visit with CHERY Bahena   [facility] ([facility])    [address]  [address]  [facility] [id number] [phone]              Who to contact     If you have questions or need follow up information about today's clinic visit or your schedule please contact [facility] directly at [phone].  Normal or non-critical lab and imaging results will be communicated to you by Digiscendhart, letter or phone within 4 business days after the clinic has received the results. If you do not hear from us within 7 days, please contact the clinic through Digiscendhart or phone. If you have a critical or abnormal lab result, we will notify you by phone as soon as possible.  Submit refill requests through [facility] or call your pharmacy and they will forward the refill request to us. Please allow 3 business days for your refill to be completed.          Additional Information About Your Visit        Digiscendhart Information     [facility] lets you send messages to your doctor, view your test results, renew your prescriptions, schedule appointments and more. To sign up, go to [email or web address] . Click on \"Log in\" on the left side of the screen, " "which will take you to the Welcome page. Then click on \"Sign up Now\" on the right side of the page.     You will be asked to enter the access code listed below, as well as some personal information. Please follow the directions to create your username and password.     Your access code is: [email or web address]  Expires: 2018  1:25 PM     Your access code will  in 90 days. If you need help or a new code, please call your [facility] or [phone].        Care EveryWhere ID     This is your Care EveryWhere ID. This could be used by other organizations to access your [place] medical records  [id number]         Blood Pressure from Last 3 Encounters:   18 106/68   18 104/62   18 102/64    Weight from Last 3 Encounters:   18 114 lb 5 oz (51.9 kg)   18 116 lb (52.6 kg)   18 118 lb (53.5 kg)              Today, you had the following     No orders found for display         Today's Medication Changes          These changes are accurate as of 18 11:59 PM.  If you have any questions, ask your nurse or doctor.               Start taking these medicines.        Dose/Directions    ranitidine 150 MG tablet   Commonly known as:  ZANTAC   Used for:  Gastroesophageal reflux disease without esophagitis   Started by:  Shanda Vo APRN CNP        Dose:  150 mg   Take 1 tablet (150 mg) by mouth 2 times daily   Quantity:  60 tablet   Refills:  1         Stop taking these medicines if you haven't already. Please contact your care team if you have questions.     omeprazole 40 MG capsule   Commonly known as:  priLOSEC   Stopped by:  Shanda Vo APRN CNP                Where to get your medicines      These medications were sent to [facility] [address] [place] [address] [address] [facility]  [address] [facility] [address]     Phone:  [phone]     ranitidine 150 MG tablet         Some of these will need a paper prescription and others can be bought over the counter.  Ask " your nurse if you have questions.     Bring a paper prescription for each of these medications     diazepam 5 MG tablet    oxyCODONE-acetaminophen 5-325 MG per tablet                Primary Care Provider Office Phone # Fax #    BOBBY Balbuena [phone]       [address] [facility] [address]  [facility] [address]        Equal Access to Services     [facility]: Hadii aad ku hadasho Soomaali, waaxda luqadaha, qaybta kaalmada adeegyada, waxay idiin hayaan adeeg kharash lacaryl . So [facility] [phone].    ATENCIÓN: Si habla español, tiene a olmstead disposición servicios gratuitos de asistencia lingüística. Mark al [phone].    We comply with applicable federal civil rights laws and [place] laws. We do not discriminate on the basis of race, color, national origin, age, disability, sex, sexual orientation, or gender identity.            Thank you!     Thank you for choosing [facility]  for your care. Our goal is always to provide you with excellent care. Hearing back from our patients is one way we can continue to improve our services. Please take a few minutes to complete the written survey that you may receive in the mail after your visit with us. Thank you!             Your Updated Medication List - Protect others around you: Learn how to safely use, store and throw away your medicines at www.disposemymeds.org.          This list is accurate as of 6/29/18 11:59 PM.  Always use your most recent med list.                   Brand Name Dispense Instructions for use Diagnosis    ADVAIR DISKUS 250-50 MCG/DOSE diskus inhaler   Generic drug:  fluticasone-salmeterol      INHALE 1 PUFF PO Q 12 HOURS .TK ON A SCHEDULED BASIS FOR COPD    Chronic obstructive pulmonary disease with acute lower respiratory infection (H)       albuterol 108 (90 Base) MCG/ACT Inhaler    PROAIR HFA/PROVENTIL HFA/VENTOLIN HFA    1 Inhaler    Inhale 2 puffs into the lungs every 4 hours as needed for  shortness of breath / dyspnea or wheezing    Chronic obstructive pulmonary disease with acute lower respiratory infection (H)       diazepam 5 MG tablet    VALIUM    60 tablet    Take 0.5-1 tablets (2.5-5 mg) by mouth every 12 hours as needed for anxiety or sleep    Anxiety, Chronic pain syndrome       SANDRA PO           mirtazapine 30 MG tablet    REMERON    30 tablet    Take 1 tablet (30 mg) by mouth At Bedtime    Recurrent major depressive disorder, in partial remission (H)       naloxone nasal spray    NARCAN    2 each    Spray 1 spray (4 mg) in nostril as needed for opioid reversal    Encounter for long-term use of opiate analgesic       ondansetron 4 MG tablet    ZOFRAN    40 tablet    Take 1 tablet (4 mg) by mouth every 8 hours as needed for nausea    Nausea       oxyCODONE-acetaminophen 5-325 MG per tablet    PERCOCET    180 tablet    Take 1-2 tablets by mouth every 6 hours as needed for pain maximum 6 tablet(s) per day    Chronic pain syndrome, Malignant neoplasm of thyroid gland (H), Complex regional pain syndrome type 2 of left lower extremity       ranitidine 150 MG tablet    ZANTAC    60 tablet    Take 1 tablet (150 mg) by mouth 2 times daily    Gastroesophageal reflux disease without esophagitis       senna-docusate 8.6-50 MG per tablet    SENEXON-S    120 tablet    Take 2 tablets by mouth 2 times daily as needed for constipation    Encounter for long-term use of opiate analgesic       Tangerine Oil           temazepam 30 MG capsule    RESTORIL    30 capsule    TAKE ONE CAPSULE (30MG) BY MOUTH NIGHTLY AS NEEDED FOR SLEEP    Insomnia, unspecified type, Chronic pain syndrome

## 2018-06-30 ASSESSMENT — ANXIETY QUESTIONNAIRES: GAD7 TOTAL SCORE: 3

## 2018-06-30 NOTE — PATIENT INSTRUCTIONS
Chronic Pain  Pain serves an important role. It lets you know something is wrong that needs your attention. When the body heals, pain normally goes away.  When pain lasts longer than 6 months, it is called  chronic  pain. This is pain that is present even after the body has healed. Chronic pain can cause mood problems and get in the way of your relationships and your daily life.  A number of conditions can cause chronic pain. Some of the more common include:    Previous surgery    An old injury    Infection    Diseases such as diabetes    Nerve damage    Back injury    Arthritis    Migraine or other headaches    Fibromyalgia    Cancer  Depression and stress can make chronic pain symptoms worse. In some cases, a cause for the pain can't be found.   Treatment  Treatment can greatly reduce pain. In many cases, pain can become less severe, occur less often, and interfere less with your daily life. Chronic pain is often treated with a combination of medicines, therapies, and lifestyle changes. You will work closely with your healthcare provider to find a treatment plan that works best for you.    Ask your healthcare provider for a referral to a pain management specialty center. These can provide the most recent and proven pain management strategies, along with emotional support and comprehensive services.    Several different types of medicines may be prescribed for chronic pain. Work with your healthcare provider to develop a medicine plan that helps manage your pain.    Physical therapy can help reduce certain types of chronic pain.    Occupational therapy teaches you how to do routine tasks of daily living in ways that lessen your discomfort.    Counseling can help you cope better with stress and pain.    Other therapies such as meditation, yoga, biofeedback, massage, and acupuncture can also help manage chronic pain.    Changing certain habits can help reduce chronic pain. They include:  ? Eating  healthy  ? Developing an exercise routine  ? Getting enough sleep   ? Stopping smoking and limiting alcohol use  ? Losing excess weight  Follow-up care  Follow up with your healthcare provider, or as advised. Let your healthcare provider know if your current treatment plan is working or if changes are needed.  Resources  For more information, contact:    American Head[employer] and Migraine Association, ma.memberDiurnal.Player X or 561-346-8543    American Chronic Pain Association, theacpa.org or 181-509-3078  Date Last Reviewed: 8/1/2017 2000-2017 "[employer]. [address] [place] [address]. All rights reserved. This information is not intended as a substitute for professional medical care. Always follow your healthcare professional's instructions.

## 2018-07-25 ENCOUNTER — OFFICE VISIT (OUTPATIENT)
Dept: FAMILY MEDICINE | Facility: CLINIC | Age: 58
End: 2018-07-25
Payer: COMMERCIAL

## 2018-07-25 ENCOUNTER — OFFICE VISIT (OUTPATIENT)
Dept: BEHAVIORAL HEALTH | Facility: CLINIC | Age: 58
End: 2018-07-25
Payer: COMMERCIAL

## 2018-07-25 VITALS
WEIGHT: 113 LBS | RESPIRATION RATE: 12 BRPM | OXYGEN SATURATION: 97 % | BODY MASS INDEX: 19.29 KG/M2 | TEMPERATURE: 97.9 F | HEIGHT: 64 IN | SYSTOLIC BLOOD PRESSURE: 132 MMHG | DIASTOLIC BLOOD PRESSURE: 72 MMHG | HEART RATE: 69 BPM

## 2018-07-25 DIAGNOSIS — G89.4 CHRONIC PAIN SYNDROME: ICD-10-CM

## 2018-07-25 DIAGNOSIS — F41.9 ANXIETY: ICD-10-CM

## 2018-07-25 DIAGNOSIS — K22.70 BARRETT'S ESOPHAGUS WITHOUT DYSPLASIA: Primary | ICD-10-CM

## 2018-07-25 DIAGNOSIS — K21.9 GASTROESOPHAGEAL REFLUX DISEASE WITHOUT ESOPHAGITIS: ICD-10-CM

## 2018-07-25 DIAGNOSIS — F41.9 ANXIETY: Primary | ICD-10-CM

## 2018-07-25 DIAGNOSIS — C73 MALIGNANT NEOPLASM OF THYROID GLAND (H): ICD-10-CM

## 2018-07-25 DIAGNOSIS — J44.9 CHRONIC OBSTRUCTIVE PULMONARY DISEASE, UNSPECIFIED COPD TYPE (H): ICD-10-CM

## 2018-07-25 DIAGNOSIS — G57.72 COMPLEX REGIONAL PAIN SYNDROME TYPE 2 OF LEFT LOWER EXTREMITY: ICD-10-CM

## 2018-07-25 DIAGNOSIS — K22.70 BARRETT'S ESOPHAGUS DETERMINED BY BIOPSY: ICD-10-CM

## 2018-07-25 PROCEDURE — 90832 PSYTX W PT 30 MINUTES: CPT | Performed by: SOCIAL WORKER

## 2018-07-25 PROCEDURE — 99214 OFFICE O/P EST MOD 30 MIN: CPT | Performed by: NURSE PRACTITIONER

## 2018-07-25 RX ORDER — OXYCODONE AND ACETAMINOPHEN 5; 325 MG/1; MG/1
1-2 TABLET ORAL EVERY 6 HOURS PRN
Qty: 180 TABLET | Refills: 0 | Status: SHIPPED | OUTPATIENT
Start: 2018-07-25 | End: 2018-08-21

## 2018-07-25 RX ORDER — OMEPRAZOLE 40 MG/1
40 CAPSULE, DELAYED RELEASE ORAL DAILY
Qty: 30 CAPSULE | Refills: 1 | Status: SHIPPED | OUTPATIENT
Start: 2018-07-25 | End: 2018-08-26

## 2018-07-25 NOTE — PROGRESS NOTES
"SUBJECTIVE:                                                    Alka Mcfadden is a 57 year old female who presents to clinic today with  for the following health issues:  [facility]: [place]    GI Referral: Rasmussen's Esophagus  Has not seen GI for rasmussen's Esophagus in the past year, only had one endoscopy. Notes her son has esophogal cancer. Was seen and diagnosed with Rasmussen's Esophagus at [facility] in 2017.  Has not followed up secondary to diagnosis of thyroid cancer and need for evaluation and treatment. Willing to follow up with GI due to current acid discomfort (see below).    Increased GERD symptoms   Unsure of what caused this acid reflux. Describes the discomfort as a painful \"infection\" that feels hot and cold. Notes her grandmother  to pancreatic cancer at age 67. For the last 5 days, pt has not been feeling well with abdominal discomfort so her appetite has been poor. Although she is hungry, she has to constantly remind herself to eat. Has mainly been eating jello and toast. She can no longer watch cooking shows without feeling sick.     She took Ranitidine, 2x daily, for about 4 days and it helped with the acid however she now has diarrhea. Also took several doses of Maalox. Has had diarrhea for the last 2 days, denies constipation. Has also been taking a baking soda mixture to help with abdominal acid. Willing to restart Omeprazole for a few months.    Weight Loss   is very concerned about pt's weight loss.     Wt Readings from Last 5 Encounters:   18 51.3 kg (113 lb)   18 51.9 kg (114 lb 5 oz)   18 52.6 kg (116 lb)   18 53.5 kg (118 lb)   18 52.2 kg (115 lb)       Acute illness concerns: Cough/ Abd pain     Onset: 5 days     Therapies Tried and outcome: Maalox, Acid reflex med.     Fever: YES    Chills/Sweats: no    Headache (location?): no    Sinus Pressure:no    Conjunctivitis:  no    Ear Pain: no    Rhinorrhea: no    Congestion: no    Sore " "Throat: no     Cough: YES    Wheeze: YES    Decreased Appetite: YES    Nausea: YES    Vomiting: no    Diarrhea:  YES    Dysuria/Freq.: YES    Fatigue/Achiness: no    Sick/Strep Exposure: no        Chronic Pain Follow-Up  Still has pain in L leg, describes the pain as \"shooting.\" Recently found out that this leg pain is radiation from low back pain. Recently, pt laid down on the couch and didn't experience any pain for 20 minutes. She felt relieved and wonders what it would be like to have no pain.     Medication  Medical marijuana dose was increased last week, has not noticed any differences.  reports they were told to wait 2 weeks to notice any thing different. Pt would like to slowly titrate, notes that each dose lasts for 4 hours. Marijuana helps with sleep. He has concerns about the side effects and recalled how he didn't hear her call for help on one incident.     Type / Location of Pain: Lt leg pain  Analgesia/pain control:       Recent changes:  same      Overall control: Inadequate pain control  Activity level/function:      Daily activities:  Unable to perform most daily activities - chores, hobbies, social activities, driving    Work:  Unable to work  Adverse effects:  No  Adherance    Taking medication as directed?  Yes    Participating in other treatments: no  Risk Factors:    Sleep:  Poor    Mood/anxiety:  improved    Recent family or social stressors:  Mom just had surgery    Other aggravating factors: prolonged sitting, prolonged standing and poor posture  PHQ-9 SCORE 12/8/2015 7/15/2016 12/5/2016   Total Score - - -   Total Score - - -   Total Score 0 0 0     JAMAL-7 SCORE 7/15/2016 12/5/2016 6/29/2018   Total Score - - -   Total Score 0 0 3   Total Score - - -     Encounter-Level CSA - 04/28/2015:          Controlled Substance Agreement - Scan on 5/1/2015 12:55 PM : Controlled Substance Agreement 04/28/15 (below)            Encounter-Level CSA - 04/28/2015:          Controlled Substance " "Agreement - Scan on 1/23/2015  9:22 AM : Controlled Medication Agreement 01/05/15 (below)                Amount of exercise or physical activity: None    Problems taking medications regularly:NO    Medication side effects: none    Diet: regular (no restrictions)    Mental Health Follow up: Depression / Anxiety     Status since last visit: stable    See PHQ-9 for current symptoms.  Other associated symptoms: None    Complicating factors: chronic pain, abdominal discomfort, sleep  Significant life event:  No   Current substance abuse:  None  Anxiety or Panic symptoms:  No    Believes her mood is \"up and down.\" Her  feels like her mood is related to her pain and what is going on in that specific moment however she is able to \"bound back to normal.\" Is able to \"stay positive.\" Misses being able to dance, wishes that she danced more than when she was younger and more able.     Sleep   Sleep has improved since the start of medical marijuana but it has decreased due to acute leg pain. Acute illness symptoms does not interfere with sleep.    Housing  While  is remodeling new house in [place], pt's sister will be staying and taking care of her.     PHQ-9  English PHQ-9   Any Language           Social History   Substance Use Topics     Smoking status: Light Tobacco Smoker     Types: Cigarettes     Smokeless tobacco: Never Used      Comment: 3-4 cigarettes per week.     Alcohol use No      Problem list and histories reviewed & adjusted, as indicated.  Additional history: as documented    Patient Active Problem List   Diagnosis     Encounter for long-term use of opiate analgesic     Hyperalgesia      ### Cataract, OU     Dry eyes     Hyperlipidemia LDL goal <130     Constipation     Insomnia     Tobacco use disorder     Anxiety     Chronic pain syndrome     Suicidal ideation     Nausea     Pancreatic atrophy     Haney's esophagus determined by biopsy     Chronic obstructive pulmonary disease (H) dx at [place]     " Malignant neoplasm of thyroid gland (H)     Complex regional pain syndrome type 2 of left lower extremity     Ankylosing spondylitis of multiple sites in spine (H)     Calculus of gallbladder without cholecystitis     Gastroesophageal reflux disease without esophagitis     Past Surgical History:   Procedure Laterality Date     ARTHROPLASTY REVISION HIP  2005, 2015     BACK SURGERY  1985/1990/1996     ENDOSCOPIC ULTRASOUND UPPER GASTROINTESTINAL TRACT (GI) N/A 1/5/2017    Procedure: ENDOSCOPIC ULTRASOUND, ESOPHAGOSCOPY / UPPER GASTROINTESTINAL TRACT (GI);  Surgeon: Esteban Mortensen MD;  Location: [facility] OR     ESOPHAGOSCOPY, GASTROSCOPY, DUODENOSCOPY (EGD), COMBINED N/A 1/5/2017    Procedure: COMBINED ESOPHAGOSCOPY, GASTROSCOPY, DUODENOSCOPY (EGD);  Surgeon: Esteban Mortensen MD;  Location: [facility] OR     JOINT REPLACEMENT  1985       Social History   Substance Use Topics     Smoking status: Light Tobacco Smoker     Types: Cigarettes     Smokeless tobacco: Never Used      Comment: 3-4 cigarettes per week.     Alcohol use No     History reviewed. No pertinent family history.        Current Outpatient Prescriptions   Medication Sig Dispense Refill     ADVAIR DISKUS 250-50 MCG/DOSE diskus inhaler INHALE 1 PUFF PO Q 12 HOURS .TK ON A SCHEDULED BASIS FOR COPD  0     albuterol (PROAIR HFA/PROVENTIL HFA/VENTOLIN HFA) 108 (90 BASE) MCG/ACT Inhaler Inhale 2 puffs into the lungs every 4 hours as needed for shortness of breath / dyspnea or wheezing 1 Inhaler 3     diazepam (VALIUM) 5 MG tablet Take 0.5-1 tablets (2.5-5 mg) by mouth every 12 hours as needed for anxiety or sleep 60 tablet 0     mirtazapine (REMERON) 30 MG tablet Take 1 tablet (30 mg) by mouth At Bedtime 30 tablet 11     naloxone (NARCAN) nasal spray Spray 1 spray (4 mg) in nostril as needed for opioid reversal 2 each 1     Norethindrone (SANDRA PO)        ondansetron (ZOFRAN) 4 MG tablet Take 1 tablet (4 mg) by mouth every 8 hours as needed for nausea 40 tablet  0     oxyCODONE-acetaminophen (PERCOCET) 5-325 MG per tablet Take 1-2 tablets by mouth every 6 hours as needed for pain maximum 6 tablet(s) per day 180 tablet 0     ranitidine (ZANTAC) 150 MG tablet Take 1 tablet (150 mg) by mouth 2 times daily 60 tablet 1     senna-docusate (SENEXON-S) 8.6-50 MG per tablet Take 2 tablets by mouth 2 times daily as needed for constipation 120 tablet 0     Tangerine OIL        temazepam (RESTORIL) 30 MG capsule TAKE ONE CAPSULE (30MG) BY MOUTH NIGHTLY AS NEEDED FOR SLEEP 30 capsule 0     Allergies   Allergen Reactions     Amoxicillin-Pot Clavulanate Rash     Augmentin Rash     Amitriptyline      Had reaction while on this and vistaril; was just not herself.     Hydroxyzine Other (See Comments)     Had reaction when took this along with amitriptyline.     Vistaril      Had reaction when took this along with amitriptyline.     Recent Labs   Lab Test  04/12/18   1142  02/02/17   1657  01/05/17   0904   12/01/16   0937  10/09/14   LDL   --    --    --    --    --    --   126   HDL   --    --    --    --    --    --   39   TRIG   --    --    --    --    --    --   137   ALT   --   30  16   --   15   < >   --    CR   --   0.75  0.64   < >  0.68   < >   --    GFRESTIMATED   --   79  >90  Non  GFR Calc     < >  89   < >   --    GFRESTBLACK   --   >90   GFR Calc    >90   GFR Calc     < >  >90   GFR Calc     < >   --    POTASSIUM   --   4.4  4.2   < >  3.7   < >   --    TSH  1.85  0.70   --    < >   --    < >   --     < > = values in this interval not displayed.      BP Readings from Last 3 Encounters:   07/25/18 132/72   06/29/18 106/68   05/30/18 104/62    Wt Readings from Last 3 Encounters:   07/25/18 51.3 kg (113 lb)   06/29/18 51.9 kg (114 lb 5 oz)   05/30/18 52.6 kg (116 lb)        Labs reviewed in EPIC  Problem list, Medication list, Allergies, and Medical/Social/Surgical histories reviewed in EPIC and updated as  "appropriate.     ROS: Constitutional, neuro, ENT, endocrine, pulmonary, cardiac, gastrointestinal, genitourinary, musculoskeletal, integument and psychiatric systems are negative, except as otherwise noted above in the HPI.    This document serves as a record of the services and decisions personally performed and made by Shanda Vo CNP. It was created on her behalf by Socorro Lyon, a trained medical scribe. The creation of this document is based on the provider's statements to the medical scribe.  Socorro Lyon 3:12 PM 7/25/2018    OBJECTIVE:                                                    /72  Pulse 69  Temp 97.9  F (36.6  C) (Oral)  Resp 12  Ht 1.625 m (5' 3.98\")  Wt 51.3 kg (113 lb)  SpO2 97%  Breastfeeding? No  BMI 19.41 kg/m2  Body mass index is 19.41 kg/(m^2).  GENERAL: Thin, alert, well nourished, well hydrated  EYES: Eyes grossly normal to inspection, extraocular movements - intact, and PERRL  RESP: lungs upper airway with faint wheezing, fine crackles in left base to auscultation - no rales, no rhonchi, no wheezes  CV: regular rates and rhythm, normal S1 S2, no S3 or S4 and no murmur,  ABDOMEN: flat, hypoactive BS, no tenderness, no  hepatosplenomegaly, no masses,   MS: extremities- no gross deformities noted, no edema  SKIN: 2 lesions:  1.4 cm diameter, raised poorly circumscribed, brown irregular surface on left upper quad of abdomen, One on upper left check 0.6 cm diameter   NEURO: strength and tone- normal, sensory exam- grossly normal, mentation- intact, speech- normal,   Non focal no aphasia. No facial asymmetry.   BACK: surgical hardware outline visible.  no CVA tenderness, no paralumbar tenderness  Mental Status Assessment:  Appearance:   Appropriate   Eye Contact:   Good   Psychomotor Behavior: Normal   Attitude:   Cooperative   Orientation:   All  Speech   Rate / Production: Normal    Volume:  Normal   Mood:    Normal  Affect:    Appropriate  Bright   Thought " Content:  Clear   Thought Form:  Coherent  Logical   Insight:    Good   Attention Span and Concentration: good  Recent and Remote Memory:  intact  Fund of Knowledge: appropriate  Muscle Strength and Tone: normal   Suicidal Ideation: reports no thoughts, no intention  Hallucination: No  Paranoid-No  Manic-No  Panic-No  Self harm-No    See [facility] notes     ASSESSMENT/PLAN:                                                    (K22.70) Haney's esophagus without dysplasia  (primary encounter diagnosis)  Comment: Has not followed up as planned secondary to diagnosis of thyroid cancer  Plan: omeprazole (PRILOSEC) 40 MG capsule,         GASTROENTEROLOGY PEDS REFERRAL +/- PROCEDURE       Willing to follow up.  Encouraged to make an appointment in the near future.     (K21.9) Gastroesophageal reflux disease without esophagitis  Comment: Only modest relief with Ranitidine.    Plan: Omeprazole 40 mg daily for then next 30-60 days then will reevaluate.     (C73) Malignant neoplasm of thyroid gland (H)  Comment: Compliant with Synthroid and lab checks.   Plan:       TSH, fT4 within expected limits.     (G57.72) Complex regional pain syndrome type 2 of left lower extremity  Comment: Long term struggle with pain management.  Currently managing with MME 30 mg in 24 hours.   Plan: oxyCODONE-acetaminophen (PERCOCET) 5-325 MG per        tablet        Will continue with current medication plan in addition to Medical Marijuana as prescribed by Medical Marijuana dispensing pharmacy    (J44.9) Chronic obstructive pulmonary disease, unspecified COPD type (H)  Comment: No changes in management of COPD, respiratory health  Plan: Advair twice daily, albuterol PRN    Remeron (mirtazapine)  High Risk Drug Monitoring? Yes  Name of Drug being monitored: Remeron  Reason for drug, and what is being monitored and why: Mood-neutral/ SI-none. Lipid and CBC.  Recent Labs   Lab Test 10/09/14   CHOL  192   HDL  39   LDL  126   TRIG  137   CHOLHDLRATIO  4.92      Lab Results   Component Value Date    WBC 7.3 04/12/2018     Lab Results   Component Value Date    RBC 4.19 04/12/2018     Lab Results   Component Value Date    HGB 12.6 04/12/2018     Lab Results   Component Value Date    HCT 39.6 04/12/2018     No components found for: MCT  Lab Results   Component Value Date    MCV 95 04/12/2018     Lab Results   Component Value Date    MCH 30.1 04/12/2018     Lab Results   Component Value Date    MCHC 31.8 04/12/2018     Lab Results   Component Value Date    RDW 13.0 04/12/2018     Lab Results   Component Value Date     04/12/2018     Follow-up Plan: Continue current treatment plan    Patient Instructions:  Let's return to taking the Omeprazole 40 mg daily for the stomach acid.       Please make an appointment for a return appointment with GI.  You saw Dr Mortensen and Dr Fajardo.      The information in this document, created by the medical scribe, Socorro Lyon, for me, accurately reflects the services I personally performed and the decisions made by me. I have reviewed and approved this document for accuracy prior to leaving the patient care area.    BOBBY Butcher [facility] PRIMARY [facility]

## 2018-07-25 NOTE — MR AVS SNAPSHOT
After Visit Summary   7/25/2018    Alka Mcfadden    MRN: [id number]           Patient Information     Date Of Birth          1960        Visit Information        Provider Department      7/25/2018 3:00 PM Shanda Vo APRN [facility] Integrated Primary Care        Today's Diagnoses     Haney's esophagus without dysplasia    -  1    Haney's esophagus determined by biopsy        Chronic obstructive pulmonary disease, unspecified COPD type (H)        Gastroesophageal reflux disease without esophagitis        Chronic pain syndrome        Malignant neoplasm of thyroid gland (H)        Complex regional pain syndrome type 2 of left lower extremity          Care Instructions    Let's return to taking the Omeprazole 40 mg daily for the stomach acid.       Please make an appointment for a return appointment with GI.  You saw Dr Mortensen and Dr Fajardo.            Follow-ups after your visit        Additional Services     GASTROENTEROLOGY PEDS REFERRAL +/- PROCEDURE       Your provider has referred you to Gastroenterology Services.    English    Procedure/Referral: REFERRAL AND PROCEDURE - You have been referred for a clinician's opinion and probable procedure(s): UPPER GI ENDOSCOPY (EGD) - Reason for procedure: Haney's and weight loss, increase GERD - Reason for procedure: Haney's  PREFERRED PROVIDERS:  [facility]: [facility] [phone]   [email or web address]  Did not follow up with GI after Haney's diagnosis.     Please be aware that coverage of these services is subject to the terms and limitations of your health insurance plan.  Call member services at your health plan with any benefit or coverage questions.  Any procedures must be performed at a [place] facility OR coordinated by your clinic's referral office.    Please bring the following with you to your appointment:    (1) Any X-Rays, CTs or MRIs  "which have been performed.  Contact the facility where they were done to arrange for  prior to your scheduled appointment.    (2) List of current medications   (3) This referral request   (4) Any documents/labs given to you for this referral                  Follow-up notes from your care team     Return in about 4 weeks (around 8/22/2018).      Who to contact     If you have questions or need follow up information about today's clinic visit or your schedule please contact [facility] PRIMARY CARE directly at [phone].  Normal or non-critical lab and imaging results will be communicated to you by MyChart, letter or phone within 4 business days after the clinic has received the results. If you do not hear from us within 7 days, please contact the clinic through MyChart or phone. If you have a critical or abnormal lab result, we will notify you by phone as soon as possible.  Submit refill requests through [employer] or call your pharmacy and they will forward the refill request to us. Please allow 3 business days for your refill to be completed.          Additional Information About Your Visit        Care EveryWhere ID     This is your Care EveryWhere ID. This could be used by other organizations to access your [place] medical records  [id number]        Your Vitals Were     Pulse Temperature Respirations Height Pulse Oximetry Breastfeeding?    69 97.9  F (36.6  C) (Oral) 12 5' 3.98\" (1.625 m) 97% No    BMI (Body Mass Index)                   19.41 kg/m2            Blood Pressure from Last 3 Encounters:   07/25/18 132/72   06/29/18 106/68   05/30/18 104/62    Weight from Last 3 Encounters:   07/25/18 113 lb (51.3 kg)   06/29/18 114 lb 5 oz (51.9 kg)   05/30/18 116 lb (52.6 kg)              We Performed the Following     GASTROENTEROLOGY PEDS REFERRAL +/- PROCEDURE          Today's Medication Changes          These changes are accurate as of 7/25/18  3:39 PM.  If you have any questions, ask " your nurse or doctor.               Start taking these medicines.        Dose/Directions    omeprazole 40 MG capsule   Commonly known as:  priLOSEC   Used for:  Haney's esophagus without dysplasia   Started by:  Shanda Vo APRN CNP        Dose:  40 mg   Take 1 capsule (40 mg) by mouth daily Take 30-60 minutes before a meal.   Quantity:  30 capsule   Refills:  1            Where to get your medicines      These medications were sent to [facility] [address] [place] [address] [address] [facility]  [address] [facility] [address]     Phone:  [phone]     omeprazole 40 MG capsule         Some of these will need a paper prescription and others can be bought over the counter.  Ask your nurse if you have questions.     Bring a paper prescription for each of these medications     oxyCODONE-acetaminophen 5-325 MG per tablet               Information about OPIOIDS     PRESCRIPTION OPIOIDS: WHAT YOU NEED TO KNOW   We gave you an opioid (narcotic) pain medicine. It is important to manage your pain, but opioids are not always the best choice. You should first try all the other options your care team gave you. Take this medicine for as short a time (and as few doses) as possible.     These medicines have risks:    DO NOT drive when on new or higher doses of pain medicine. These medicines can affect your alertness and reaction times, and you could be arrested for driving under the influence (DUI). If you need to use opioids long-term, talk to your care team about driving.    DO NOT operate heave machinery    DO NOT do any other dangerous activities while taking these medicines.     DO NOT drink any alcohol while taking these medicines.      If the opioid prescribed includes acetaminophen, DO NOT take with any other medicines that contain acetaminophen. Read all labels carefully. Look for the word  acetaminophen  or  Tylenol.  Ask your pharmacist if you have questions or are unsure.    You can get addicted to pain  medicines, especially if you have a history of addiction (chemical, alcohol or substance dependence). Talk to your care team about ways to reduce this risk.    Store your pills in a secure place, locked if possible. We will not replace any lost or stolen medicine. If you don t finish your medicine, please throw away (dispose) as directed by your pharmacist. The [place] Pollution Control Agency has more information about safe disposal: https://www.pca.[facility].[place].us/living-green/managing-unwanted-medications.     All opioids tend to cause constipation. Drink plenty of water and eat foods that have a lot of fiber, such as fruits, vegetables, prune juice, apple juice and high-fiber cereal. Take a laxative (Miralax, milk of magnesia, Colace, Senna) if you don t move your bowels at least every other day.          Primary Care Provider Office Phone # Fax #    Shanda CrespoBOBBY Marquez [phone]       [address] [address]        Equal Access to Services     PAULIE MARQUEZ : Hadii jerry ku hadasho Soomaali, waaxda luqadaha, qaybta kaalmada adeegyada, waxluis rocha . So [facility] [phone].    ATENCIÓN: Si habla español, tiene a olmstead disposición servicios gratuitos de asistencia lingüística. Llame al [phone].    We comply with applicable federal civil rights laws and [place] laws. We do not discriminate on the basis of race, color, national origin, age, disability, sex, sexual orientation, or gender identity.            Thank you!     Thank you for choosing [facility] PRIMARY CARE  for your care. Our goal is always to provide you with excellent care. Hearing back from our patients is one way we can continue to improve our services. Please take a few minutes to complete the written survey that you may receive in the mail after your visit with us. Thank you!             Your Updated Medication List - Protect others around you: Learn how to  safely use, store and throw away your medicines at www.disposemymeds.org.          This list is accurate as of 7/25/18  3:39 PM.  Always use your most recent med list.                   Brand Name Dispense Instructions for use Diagnosis    ADVAIR DISKUS 250-50 MCG/DOSE diskus inhaler   Generic drug:  fluticasone-salmeterol      INHALE 1 PUFF PO Q 12 HOURS .TK ON A SCHEDULED BASIS FOR COPD    Chronic obstructive pulmonary disease with acute lower respiratory infection (H)       albuterol 108 (90 Base) MCG/ACT Inhaler    PROAIR HFA/PROVENTIL HFA/VENTOLIN HFA    1 Inhaler    Inhale 2 puffs into the lungs every 4 hours as needed for shortness of breath / dyspnea or wheezing    Chronic obstructive pulmonary disease with acute lower respiratory infection (H)       diazepam 5 MG tablet    VALIUM    60 tablet    Take 0.5-1 tablets (2.5-5 mg) by mouth every 12 hours as needed for anxiety or sleep    Anxiety, Chronic pain syndrome       SANDRA PO           mirtazapine 30 MG tablet    REMERON    30 tablet    Take 1 tablet (30 mg) by mouth At Bedtime    Recurrent major depressive disorder, in partial remission (H)       naloxone nasal spray    NARCAN    2 each    Spray 1 spray (4 mg) in nostril as needed for opioid reversal    Encounter for long-term use of opiate analgesic       omeprazole 40 MG capsule    priLOSEC    30 capsule    Take 1 capsule (40 mg) by mouth daily Take 30-60 minutes before a meal.    Haney's esophagus without dysplasia       ondansetron 4 MG tablet    ZOFRAN    40 tablet    Take 1 tablet (4 mg) by mouth every 8 hours as needed for nausea    Nausea       oxyCODONE-acetaminophen 5-325 MG per tablet    PERCOCET    180 tablet    Take 1-2 tablets by mouth every 6 hours as needed for pain maximum 6 tablet(s) per day    Chronic pain syndrome, Malignant neoplasm of thyroid gland (H), Complex regional pain syndrome type 2 of left lower extremity       ranitidine 150 MG tablet    ZANTAC    60 tablet    Take 1  tablet (150 mg) by mouth 2 times daily    Gastroesophageal reflux disease without esophagitis       senna-docusate 8.6-50 MG per tablet    SENEXON-S    120 tablet    Take 2 tablets by mouth 2 times daily as needed for constipation    Encounter for long-term use of opiate analgesic       Tangerine Oil           temazepam 30 MG capsule    RESTORIL    30 capsule    TAKE ONE CAPSULE (30MG) BY MOUTH NIGHTLY AS NEEDED FOR SLEEP    Insomnia, unspecified type, Chronic pain syndrome

## 2018-07-25 NOTE — PATIENT INSTRUCTIONS
Let's return to taking the Omeprazole 40 mg daily for the stomach acid.       Please make an appointment for a return appointment with GI.  You saw Dr Mortensen and Dr Fajardo.

## 2018-07-25 NOTE — PROGRESS NOTES
[facility] Integrated Primary [facility]   July 25, 2018      Behavioral Health Clinician Progress Note    Patient Name: Alka Mcfadden           Service Type: Individual      Service Location:   Face to Face in Clinic     Session Start Time: 3:10pm  Session End Time: 4:40pm      Session Length: 16 - 37      Attendees: Patient and Spouse / Significant Other    Visit Activities (Refresh list every visit): [facility] [employer]    Diagnostic Assessment Date: not completed yet  Treatment Plan Review Date: not completed yet  See Flowsheets for today's PHQ-9 and JAMAL-7 results  Previous PHQ-9:   PHQ-9 SCORE 12/8/2015 7/15/2016 12/5/2016   Total Score - - -   Total Score - - -   Total Score 0 0 0     Previous JAMAL-7:   JAMAL-7 SCORE 7/15/2016 12/5/2016 6/29/2018   Total Score - - -   Total Score 0 0 3   Total Score - - -       KENDRA LEVEL:  No flowsheet data found.    DATA  Extended Session (60+ minutes): No  Interactive Complexity: No  Crisis: No    Treatment Objective(s) Addressed in This Session:  Target Behavior(s): disease management/lifestyle changes mental health    Depressed Mood: Increase interest, engagement, and pleasure in doing things  Decrease frequency and intensity of feeling down, depressed, hopeless  Identify negative self-talk and behaviors: challenge core beliefs, myths, and actions  Improve concentration, focus, and mindfulness in daily activities   Anxiety: will experience a reduction in anxiety, will develop more effective coping skills to manage anxiety symptoms, will develop healthy cognitive patterns and beliefs and will increase ability to function adaptively  Psychological distress related to Pain  Psychological distress related to Chronic Disease Management    Current Stressors / Issues:  The patient was seen by [facility] per the request of the PCP.  She reported having lower appetite and she has lost about 1 pound since the last visit-she continue to use medical marijuana and recently had an increase in  "dosage-she had conversation with PCP about the management of her symptoms with traditional and alternative treatments-they have been dealing with a lot of stress with the move to [employer] in [place]-regarding sleeping the last 5 days her sleep has been \"bad\" due to recent health issues and pain-the patient agreed with the treatment for her most recent health issues and agreed with PCP recommendation for further assessment/evaluation of health conditions-regarding modo she reported having up and down mood-when she is going through more discomfort she has to put forth more effort to have positive stable mood-      Progress on Treatment Objective(s) / Homework:  Minimal progress - ACTION (Actively working towards change); Intervened by reinforcing change plan / affirming steps taken    Motivational Interviewing    MI Intervention: Expressed Empathy/Understanding, Supported Autonomy, Collaboration, Evocation, Permission to raise concern or advise, Open-ended questions, Reflections: simple and complex, Rolled with resistance: Simple reflection, Complex reflection and Evoked patient agenda and Change talk (evoked)     Change Talk Expressed by the Patient: Desire to change Ability to change Reasons to change Need to change Committment to change Activation Taking steps    Provider Response to Change Talk: E - Evoked more info from patient about behavior change, A - Affirmed patient's thoughts, decisions, or attempts at behavior change, R - Reflected patient's change talk and S - Summarized patient's change talk statements      Care Plan review completed: No    Medication Review:  No changes to current psychiatric medication(s)    Medication Compliance:  NA    Changes in Health Issues:   Yes: Pain, Associated Psychological Distress  Chronic disease management, Associated Psychological Distress    Chemical Use Review:   Substance Use: Chemical use reviewed, no active concerns identified      Tobacco Use: No current tobacco " use.      Assessment: Current Emotional / Mental Status (status of significant symptoms):  Risk status (Self / Other harm or suicidal ideation)  Patient denies a history of suicidal ideation, suicide attempts, self-injurious behavior, homicidal ideation, homicidal behavior and and other safety concerns  Patient denies current fears or concerns for personal safety.  Patient denies current or recent suicidal ideation or behaviors.  Patient denies current or recent homicidal ideation or behaviors.  Patient denies current or recent self injurious behavior or ideation.  Patient denies other safety concerns.  A safety and risk management plan has not been developed at this time, however patient was encouraged to call [place] [phone] should there be a change in any of these risk factors.    Appearance:   Appropriate   Eye Contact:   Good   Psychomotor Behavior: Normal   Attitude:   Cooperative   Orientation:   All  Speech   Rate / Production: Normal    Volume:  Normal   Mood:    Normal  Affect:    Appropriate   Thought Content:  Clear   Thought Form:  Coherent  Goal Directed  Logical   Insight:    Good     Diagnoses:  1. Anxiety        Collateral Reports Completed:  Not Applicable    Plan: (Homework, other):  Patient was given information about behavioral services and encouraged to schedule a follow up appointment with the clinic [facility] as needed.  She was also given information about mental health symptoms and treatment options .  CD Recommendations: No indications of CD issues.  CHERY Benedict, [facility]      ______________________________________________________________________

## 2018-07-25 NOTE — MR AVS SNAPSHOT
After Visit Summary   7/25/2018    Alka Mcfadden    MRN: [id number]           Patient Information     Date Of Birth          1960        Visit Information        Provider Department      7/25/2018 3:00 PM Vaughn Eldridge, [facility]        Today's Diagnoses     Anxiety    -  1       Follow-ups after your visit        Your next 10 appointments already scheduled     Aug 17, 2018  3:00 PM CDT   Return Visit with Cherelle Aguilera [facility] ([facility])    [address]  [address]  [facility] [id number] [phone]            Aug 17, 2018  3:00 PM CDT   Return Visit with BOBBY Balbuena CNP   [facility] ([facility])    [address]  [address]  [facility] [id number] [phone]              Who to contact     If you have questions or need follow up information about today's clinic visit or your schedule please contact [facility] directly at [phone].  Normal or non-critical lab and imaging results will be communicated to you by MyChart, letter or phone within 4 business days after the clinic has received the results. If you do not hear from us within 7 days, please contact the clinic through MyChart or phone. If you have a critical or abnormal lab result, we will notify you by phone as soon as possible.  Submit refill requests through [employer] or call your pharmacy and they will forward the refill request to us. Please allow 3 business days for your refill to be completed.          Additional Information About Your Visit        Care EveryWhere ID     This is your Care EveryWhere ID. This could be used by other organizations to access your [place] medical records  [id number]         Blood Pressure from Last 3 Encounters:   07/25/18 132/72   06/29/18 106/68   05/30/18 104/62     Weight from Last 3 Encounters:   07/25/18 113 lb (51.3 kg)   06/29/18 114 lb 5 oz (51.9 kg)   05/30/18 116 lb (52.6 kg)              Today, you had the following     No orders found for display         Today's Medication Changes          These changes are accurate as of 7/25/18  4:24 PM.  If you have any questions, ask your nurse or doctor.               Start taking these medicines.        Dose/Directions    omeprazole 40 MG capsule   Commonly known as:  priLOSEC   Used for:  Haney's esophagus without dysplasia   Started by:  Shanda Vo APRN CNP        Dose:  40 mg   Take 1 capsule (40 mg) by mouth daily Take 30-60 minutes before a meal.   Quantity:  30 capsule   Refills:  1            Where to get your medicines      These medications were sent to [facility] [address] [place] [address] [address] [facility]  [address] [facility] [address]     Phone:  [phone]     omeprazole 40 MG capsule         Some of these will need a paper prescription and others can be bought over the counter.  Ask your nurse if you have questions.     Bring a paper prescription for each of these medications     oxyCODONE-acetaminophen 5-325 MG per tablet                Primary Care Provider Office Phone # Fax #    BOBBY Balbuena [phone]       [address] [facility] [address]  [facility] [address]        Equal Access to Services     PAULIE MARQUEZ AH: Hadii jerry ku hadasho Soomaali, waaxda luqadaha, qaybta kaalmada adeegyada, felipe snell. So [facility] [phone].    ATENCIÓN: Si habla español, tiene a olmstead disposición servicios gratuitos de asistencia lingüística. Llame al [phone].    We comply with applicable federal civil rights laws and [place] laws. We do not discriminate on the basis of race, color, national origin, age, disability, sex, sexual orientation, or gender identity.            Thank you!     Thank you for choosing [facility] PRIMARY CARE  for  your care. Our goal is always to provide you with excellent care. Hearing back from our patients is one way we can continue to improve our services. Please take a few minutes to complete the written survey that you may receive in the mail after your visit with us. Thank you!             Your Updated Medication List - Protect others around you: Learn how to safely use, store and throw away your medicines at www.disposemymeds.org.          This list is accurate as of 7/25/18  4:24 PM.  Always use your most recent med list.                   Brand Name Dispense Instructions for use Diagnosis    ADVAIR DISKUS 250-50 MCG/DOSE diskus inhaler   Generic drug:  fluticasone-salmeterol      INHALE 1 PUFF PO Q 12 HOURS .TK ON A SCHEDULED BASIS FOR COPD    Chronic obstructive pulmonary disease with acute lower respiratory infection (H)       albuterol 108 (90 Base) MCG/ACT Inhaler    PROAIR HFA/PROVENTIL HFA/VENTOLIN HFA    1 Inhaler    Inhale 2 puffs into the lungs every 4 hours as needed for shortness of breath / dyspnea or wheezing    Chronic obstructive pulmonary disease with acute lower respiratory infection (H)       diazepam 5 MG tablet    VALIUM    60 tablet    Take 0.5-1 tablets (2.5-5 mg) by mouth every 12 hours as needed for anxiety or sleep    Anxiety, Chronic pain syndrome       SANDRA PO           mirtazapine 30 MG tablet    REMERON    30 tablet    Take 1 tablet (30 mg) by mouth At Bedtime    Recurrent major depressive disorder, in partial remission (H)       naloxone nasal spray    NARCAN    2 each    Spray 1 spray (4 mg) in nostril as needed for opioid reversal    Encounter for long-term use of opiate analgesic       omeprazole 40 MG capsule    priLOSEC    30 capsule    Take 1 capsule (40 mg) by mouth daily Take 30-60 minutes before a meal.    Haney's esophagus without dysplasia       ondansetron 4 MG tablet    ZOFRAN    40 tablet    Take 1 tablet (4 mg) by mouth every 8 hours as needed for nausea    Nausea        oxyCODONE-acetaminophen 5-325 MG per tablet    PERCOCET    180 tablet    Take 1-2 tablets by mouth every 6 hours as needed for pain maximum 6 tablet(s) per day    Chronic pain syndrome, Malignant neoplasm of thyroid gland (H), Complex regional pain syndrome type 2 of left lower extremity       ranitidine 150 MG tablet    ZANTAC    60 tablet    Take 1 tablet (150 mg) by mouth 2 times daily    Gastroesophageal reflux disease without esophagitis       senna-docusate 8.6-50 MG per tablet    SENEXON-S    120 tablet    Take 2 tablets by mouth 2 times daily as needed for constipation    Encounter for long-term use of opiate analgesic       Tangerine Oil           temazepam 30 MG capsule    RESTORIL    30 capsule    TAKE ONE CAPSULE (30MG) BY MOUTH NIGHTLY AS NEEDED FOR SLEEP    Insomnia, unspecified type, Chronic pain syndrome

## 2018-08-16 NOTE — PATIENT INSTRUCTIONS
Please call with questions from the article that we gave you in clinic today on Thyroid cancer.     Please let us know the outcome of the visit and biopsy at [facility] on 10/19/2017.    We will santamaria a prescription for Oxycodone if needed once you know the surgery date, to begin taking 7 days before surgery.        16-Aug-2018

## 2018-08-24 ENCOUNTER — PRE VISIT (OUTPATIENT)
Dept: GASTROENTEROLOGY | Facility: CLINIC | Age: 58
End: 2018-08-24

## 2018-08-24 NOTE — TELEPHONE ENCOUNTER
FUTURE VISIT INFORMATION      FUTURE VISIT INFORMATION:    Date: 8/28/18    Time:     Location: [facility]  REFERRAL INFORMATION:    Referring provider:  Shanda Vo CNP    Referring providers clinic:      Reason for visit/diagnosis  Haney's Esophagus without Dysplasia    RECORDS STATUS      NOTES STATUS DETAILS   OFFICE NOTE from referring provider Internal 7/25/18   OFFICE NOTE from other specialist N/A    DISCHARGE SUMMARY from hospital N/A    OPERATIVE REPORT N/A    MEDICATION LIST Internal         ENDOSCOPY  N/A    COLONOSCOPY Internal 1/13/17   ERCP N/A    EUS Internal 1/5/17   STOOL TESTING Internal    PERTINENT LABS Internal    PATHOLOGY REPORTS (RELATED) Internal 1/5/17   IMAGING (CT, MRI, EGD) Internal

## 2018-08-26 ENCOUNTER — APPOINTMENT (OUTPATIENT)
Dept: GENERAL RADIOLOGY | Facility: CLINIC | Age: 58
End: 2018-08-26
Attending: EMERGENCY MEDICINE
Payer: MEDICARE

## 2018-08-26 ENCOUNTER — HOSPITAL ENCOUNTER (EMERGENCY)
Facility: CLINIC | Age: 58
Discharge: HOME | End: 2018-08-26
Attending: EMERGENCY MEDICINE | Admitting: EMERGENCY MEDICINE
Payer: MEDICARE

## 2018-08-26 ENCOUNTER — APPOINTMENT (OUTPATIENT)
Dept: ULTRASOUND IMAGING | Facility: CLINIC | Age: 58
End: 2018-08-26
Attending: EMERGENCY MEDICINE
Payer: MEDICARE

## 2018-08-26 VITALS
DIASTOLIC BLOOD PRESSURE: 52 MMHG | OXYGEN SATURATION: 99 % | SYSTOLIC BLOOD PRESSURE: 113 MMHG | TEMPERATURE: 98.4 F | RESPIRATION RATE: 18 BRPM | HEART RATE: 79 BPM | WEIGHT: 109.13 LBS | BODY MASS INDEX: 18.75 KG/M2

## 2018-08-26 DIAGNOSIS — K80.20 CALCULUS OF GALLBLADDER WITHOUT CHOLECYSTITIS WITHOUT OBSTRUCTION: ICD-10-CM

## 2018-08-26 DIAGNOSIS — N30.00 ACUTE CYSTITIS WITHOUT HEMATURIA: ICD-10-CM

## 2018-08-26 DIAGNOSIS — R10.11 ABDOMINAL PAIN, RIGHT UPPER QUADRANT: ICD-10-CM

## 2018-08-26 LAB
ALBUMIN SERPL-MCNC: 3.9 G/DL (ref 3.4–5)
ALBUMIN UR-MCNC: NEGATIVE MG/DL
ALP SERPL-CCNC: 129 U/L (ref 40–150)
ALT SERPL W P-5'-P-CCNC: 25 U/L (ref 0–50)
ANION GAP SERPL CALCULATED.3IONS-SCNC: 9 MMOL/L (ref 3–14)
APPEARANCE UR: ABNORMAL
AST SERPL W P-5'-P-CCNC: 14 U/L (ref 0–45)
BASOPHILS # BLD AUTO: 0 10E9/L (ref 0–0.2)
BASOPHILS NFR BLD AUTO: 0.3 %
BILIRUB SERPL-MCNC: 0.6 MG/DL (ref 0.2–1.3)
BILIRUB UR QL STRIP: NEGATIVE
BUN SERPL-MCNC: 9 MG/DL (ref 7–30)
CALCIUM SERPL-MCNC: 9.5 MG/DL (ref 8.5–10.1)
CHLORIDE SERPL-SCNC: 100 MMOL/L (ref 94–109)
CO2 SERPL-SCNC: 27 MMOL/L (ref 20–32)
COLOR UR AUTO: YELLOW
CREAT SERPL-MCNC: 0.66 MG/DL (ref 0.52–1.04)
DIFFERENTIAL METHOD BLD: NORMAL
EOSINOPHIL # BLD AUTO: 0.1 10E9/L (ref 0–0.7)
EOSINOPHIL NFR BLD AUTO: 0.6 %
ERYTHROCYTE [DISTWIDTH] IN BLOOD BY AUTOMATED COUNT: 12.5 % (ref 10–15)
GFR SERPL CREATININE-BSD FRML MDRD: >90 ML/MIN/1.7M2
GLUCOSE SERPL-MCNC: 89 MG/DL (ref 70–99)
GLUCOSE UR STRIP-MCNC: NEGATIVE MG/DL
HCT VFR BLD AUTO: 38.8 % (ref 35–47)
HGB BLD-MCNC: 13 G/DL (ref 11.7–15.7)
HGB UR QL STRIP: NEGATIVE
IMM GRANULOCYTES # BLD: 0 10E9/L (ref 0–0.4)
IMM GRANULOCYTES NFR BLD: 0.1 %
KETONES UR STRIP-MCNC: 40 MG/DL
LEUKOCYTE ESTERASE UR QL STRIP: ABNORMAL
LIPASE SERPL-CCNC: 160 U/L (ref 73–393)
LYMPHOCYTES # BLD AUTO: 1.9 10E9/L (ref 0.8–5.3)
LYMPHOCYTES NFR BLD AUTO: 23.5 %
MCH RBC QN AUTO: 30.4 PG (ref 26.5–33)
MCHC RBC AUTO-ENTMCNC: 33.5 G/DL (ref 31.5–36.5)
MCV RBC AUTO: 91 FL (ref 78–100)
MONOCYTES # BLD AUTO: 0.5 10E9/L (ref 0–1.3)
MONOCYTES NFR BLD AUTO: 6.5 %
MUCOUS THREADS #/AREA URNS LPF: PRESENT /LPF
NEUTROPHILS # BLD AUTO: 5.5 10E9/L (ref 1.6–8.3)
NEUTROPHILS NFR BLD AUTO: 69 %
NITRATE UR QL: NEGATIVE
NRBC # BLD AUTO: 0 10*3/UL
NRBC BLD AUTO-RTO: 0 /100
PH UR STRIP: 5 PH (ref 5–7)
PLATELET # BLD AUTO: 325 10E9/L (ref 150–450)
POTASSIUM SERPL-SCNC: 4 MMOL/L (ref 3.4–5.3)
PROT SERPL-MCNC: 7.7 G/DL (ref 6.8–8.8)
RBC # BLD AUTO: 4.28 10E12/L (ref 3.8–5.2)
RBC #/AREA URNS AUTO: 3 /HPF (ref 0–2)
SODIUM SERPL-SCNC: 136 MMOL/L (ref 133–144)
SOURCE: ABNORMAL
SP GR UR STRIP: 1.01 (ref 1–1.03)
SQUAMOUS #/AREA URNS AUTO: 5 /HPF (ref 0–1)
TSH SERPL DL<=0.005 MIU/L-ACNC: 0.98 MU/L (ref 0.4–4)
UROBILINOGEN UR STRIP-MCNC: NORMAL MG/DL (ref 0–2)
WBC # BLD AUTO: 8 10E9/L (ref 4–11)
WBC #/AREA URNS AUTO: 62 /HPF (ref 0–5)

## 2018-08-26 PROCEDURE — 84443 ASSAY THYROID STIM HORMONE: CPT | Performed by: EMERGENCY MEDICINE

## 2018-08-26 PROCEDURE — 99284 EMERGENCY DEPT VISIT MOD MDM: CPT | Mod: Z6 | Performed by: EMERGENCY MEDICINE

## 2018-08-26 PROCEDURE — 96361 HYDRATE IV INFUSION ADD-ON: CPT | Performed by: EMERGENCY MEDICINE

## 2018-08-26 PROCEDURE — 99285 EMERGENCY DEPT VISIT HI MDM: CPT | Mod: 25 | Performed by: EMERGENCY MEDICINE

## 2018-08-26 PROCEDURE — 71046 X-RAY EXAM CHEST 2 VIEWS: CPT

## 2018-08-26 PROCEDURE — 81001 URINALYSIS AUTO W/SCOPE: CPT | Performed by: EMERGENCY MEDICINE

## 2018-08-26 PROCEDURE — 96360 HYDRATION IV INFUSION INIT: CPT | Performed by: EMERGENCY MEDICINE

## 2018-08-26 PROCEDURE — 85025 COMPLETE CBC W/AUTO DIFF WBC: CPT | Performed by: EMERGENCY MEDICINE

## 2018-08-26 PROCEDURE — 80053 COMPREHEN METABOLIC PANEL: CPT | Performed by: EMERGENCY MEDICINE

## 2018-08-26 PROCEDURE — 25000128 H RX IP 250 OP 636: Performed by: EMERGENCY MEDICINE

## 2018-08-26 PROCEDURE — 76705 ECHO EXAM OF ABDOMEN: CPT

## 2018-08-26 PROCEDURE — 83690 ASSAY OF LIPASE: CPT | Performed by: EMERGENCY MEDICINE

## 2018-08-26 RX ORDER — NITROFURANTOIN 25; 75 MG/1; MG/1
100 CAPSULE ORAL 2 TIMES DAILY
Qty: 14 CAPSULE | Refills: 0 | Status: SHIPPED | OUTPATIENT
Start: 2018-08-26

## 2018-08-26 RX ORDER — ONDANSETRON 2 MG/ML
4 INJECTION INTRAMUSCULAR; INTRAVENOUS ONCE
Status: DISCONTINUED | OUTPATIENT
Start: 2018-08-26 | End: 2018-08-26 | Stop reason: HOSPADM

## 2018-08-26 RX ORDER — OXYCODONE HYDROCHLORIDE 5 MG/1
5 TABLET ORAL ONCE
Status: DISCONTINUED | OUTPATIENT
Start: 2018-08-26 | End: 2018-08-26 | Stop reason: HOSPADM

## 2018-08-26 RX ADMIN — SODIUM CHLORIDE 1000 ML: 9 INJECTION, SOLUTION INTRAVENOUS at 12:08

## 2018-08-26 ASSESSMENT — ENCOUNTER SYMPTOMS
ABDOMINAL DISTENTION: 0
ABDOMINAL PAIN: 1
CHEST TIGHTNESS: 0
EYE PAIN: 0
VOMITING: 1
FATIGUE: 0
DIZZINESS: 0
FLANK PAIN: 0
HEMATOLOGIC/LYMPHATIC NEGATIVE: 1
HEMATURIA: 0
DYSURIA: 0
SEIZURES: 0
AGITATION: 0
FREQUENCY: 0
CHILLS: 1
NUMBNESS: 0
WHEEZING: 0
HEADACHES: 0
PALPITATIONS: 0
SHORTNESS OF BREATH: 1
BLOOD IN STOOL: 0
APNEA: 0
WEAKNESS: 1
NAUSEA: 1
MYALGIAS: 0
DIFFICULTY URINATING: 0
COUGH: 1
DIAPHORESIS: 0
ARTHRALGIAS: 0
CONSTIPATION: 0
FEVER: 0
DIARRHEA: 0

## 2018-08-26 NOTE — ED AVS SNAPSHOT
[facility], Emergency Department    [address]    [facility] [address]    Phone:  [phone]    Fax:  [phone]                                       Alka Mcfadden   MRN: [id number]    Department:  [facility], Emergency Department   Date of Visit:  8/26/2018           After Visit Summary Signature Page     I have received my discharge instructions, and my questions have been answered. I have discussed any challenges I see with this plan with the nurse or doctor.    ..........................................................................................................................................  Patient/Patient Representative Signature      ..........................................................................................................................................  Patient Representative Print Name and Relationship to Patient    ..................................................               ................................................  Date                                            Time    ..........................................................................................................................................  Reviewed by Signature/Title    ...................................................              ..............................................  Date                                                            Time          22EPIC Rev 08/18

## 2018-08-26 NOTE — ED AVS SNAPSHOT
[facility], Emergency Department    [address]    [place] [address]    Phone:  [phone]    Fax:  [phone]                                       Alka Mcfadden   MRN: [id number]    Department:  [facility], Emergency Department   Date of Visit:  8/26/2018           Patient Information     Date Of Birth          1960        Your diagnoses for this visit were:     Abdominal pain, right upper quadrant     Acute cystitis without hematuria        You were seen by Wilman Phan MD.      Follow-up Information     Follow up with Shanda Vo APRN CNP. Schedule an appointment as soon as possible for a visit in 1 week.    Specialty:  Nurse Practitioner - Family    Contact information:    [address]   [facility] [address] [phone]          Discharge Instructions       Please make an appointment to follow up with Your Primary Care Provider and GI Clinic (phone: [phone]) in 3 days even if entirely better.      Abdominal Pain    Abdominal pain is pain in the stomach or belly area. Everyone has this pain from time to time. In many cases it goes away on its own. But abdominal pain can sometimes be due to a serious problem, such as appendicitis. So it s important to know when to seek help.  Causes of abdominal pain  There are many possible causes of abdominal pain. Common causes in adults include:    Constipation, diarrhea, or gas    Stomach acid flowing back up into the esophagus (acid reflux or heartburn)    Severe acid reflux, called GERD (gastroesophageal reflux disease)    A sore in the lining of the stomach or small intestine (peptic ulcer)    Inflammation of the gallbladder, liver, or pancreas    Gallstones or kidney stones    Appendicitis     Intestinal blockage     An internal organ pushing through a muscle or other tissue (hernia)    Urinary tract infections    In women, menstrual cramps, fibroids, or endometriosis    Inflammation or infection of the  intestines  Diagnosing the cause of abdominal pain  Your healthcare provider will do a physical exam help find the cause of your pain. If needed, tests will be ordered. Belly pain has many possible causes. So it can be hard to find the reason for your pain. Giving details about your pain can help. Tell your provider where and when you feel the pain, and what makes it better or worse. Also let your provider know if you have other symptoms such as:    Fever    Tiredness    Upset stomach (nausea)    Vomiting    Changes in bathroom habits  Treating abdominal pain  Some causes of pain need emergency medical treatment right away. These include appendicitis or a bowel blockage. Other problems can be treated with rest, fluids, or medicines. Your healthcare provider can give you specific instructions for treatment or self-care based on what is causing your pain.  If you have vomiting or diarrhea, sip water or other clear fluids. When you are ready to eat solid foods again, start with small amounts of easy-to-digest, low-fat foods. These include apple sauce, toast, or crackers.   When to seek medical care  Call 911 or go to the hospital right away if you:    Can t pass stool and are vomiting    Are vomiting blood or have bloody diarrhea or black, tarry diarrhea    Have chest, neck, or shoulder pain    Feel like you might pass out    Have pain in your shoulder blades with nausea    Have sudden, severe belly pain    Have new, severe pain unlike any you have felt before    Have a belly that is rigid, hard, and tender to touch  Call your healthcare provider if you have:    Pain for more than 5 days    Bloating for more than 2 days    Diarrhea for more than 5 days    A fever of 100.4 F (38 C) or higher, or as directed by your healthcare provider    Pain that gets worse    Weight loss for no reason    Continued lack of appetite    Blood in your stool  How to prevent abdominal pain  Here are some tips to help prevent abdominal  pain:    Eat smaller amounts of food at one time.    Avoid greasy, fried, or other high-fat foods.    Avoid foods that give you gas.    Exercise regularly.    Drink plenty of fluids.  To help prevent GERD symptoms:    Quit smoking.    Reduce alcohol and certain foods that increase stomach acid.    Avoid aspirin and over-the-counter pain and fever medicines (NSAIDS or nonsteroidal anti-inflammatory drugs), if possible    Lose extra weight.    Finish eating at least 2 hours before you go to bed or lie down.    Raise the head of your bed.  Date Last Reviewed: 7/1/2016 2000-2017 [employer]. [address] [place] [address]. All rights reserved. This information is not intended as a substitute for professional medical care. Always follow your healthcare professional's instructions.            Your next 10 appointments already scheduled     Aug 28, 2018 12:00 PM CDT   (Arrive by 11:45 AM)   New Patient Visit with Pawel Uribe PA-C   [facility] ([facility])    [address] [place] [address]  [address] [facility] [address]   [phone]            Sep 27, 2018  3:00 PM CDT   Return Visit with CHERY Campos   [facility] ([facility])    [address]  [address]  [facility] [address]   [phone]            Sep 27, 2018  3:00 PM CDT   Return Visit with BOBBY Balbuena CNP   [facility] ([facility])    [address]  [address]  [facility] [address]   [phone]            Dec 21, 2018 10:00 AM CST   (Arrive by 9:45 AM)   New Patient Visit with Charlie Newman   [facility] ([facility])    [address] [facility]  [address] [facility] [address]   [phone]              24 Hour Appointment Hotline       To make an appointment at any  [facility], call [phone] ([phone]). If you don't have a family doctor or clinic, we will help you find one. [facility] are conveniently located to serve the needs of you and your family.             Review of your medicines      START taking        Dose / Directions Last dose taken    nitroFURantoin (macrocrystal-monohydrate) 100 MG capsule   Commonly known as:  MACROBID   Dose:  100 mg   Quantity:  14 capsule        Take 1 capsule (100 mg) by mouth 2 times daily   Refills:  0          Our records show that you are taking the medicines listed below. If these are incorrect, please call your family doctor or clinic.        Dose / Directions Last dose taken    ADVAIR DISKUS 250-50 MCG/DOSE diskus inhaler   Generic drug:  fluticasone-salmeterol        INHALE 1 PUFF PO Q 12 HOURS .TK ON A SCHEDULED BASIS FOR COPD   Refills:  0        albuterol 108 (90 Base) MCG/ACT inhaler   Commonly known as:  PROAIR HFA/PROVENTIL HFA/VENTOLIN HFA   Dose:  2 puff   Quantity:  1 Inhaler        Inhale 2 puffs into the lungs every 4 hours as needed for shortness of breath / dyspnea or wheezing   Refills:  3        diazepam 5 MG tablet   Commonly known as:  VALIUM   Dose:  2.5-5 mg   Indication:  Feeling Anxious, Muscle Spasm   Quantity:  60 tablet        Take 0.5-1 tablets (2.5-5 mg) by mouth every 12 hours as needed for anxiety or sleep   Refills:  0        SANDRA PO        Refills:  0        medical cannabis (Patient's own supply.  Not a prescription)        See Admin Instructions (This is NOT a prescription, and does not certify that the patient has a qualifying medical condition for medical cannabis.  The purpose of this order is  to document that the patient reports taking medical cannabis.)   Refills:  0        mirtazapine 30 MG tablet   Commonly known as:  REMERON   Dose:  30 mg   Quantity:  30 tablet        Take 1 tablet (30 mg) by mouth At Bedtime   Refills:  11        naloxone nasal spray   Commonly  known as:  NARCAN   Dose:  4 mg   Quantity:  2 each        Spray 1 spray (4 mg) in nostril as needed for opioid reversal   Refills:  1        senna-docusate 8.6-50 MG per tablet   Commonly known as:  SENEXON-S   Dose:  2 tablet   Quantity:  120 tablet        Take 2 tablets by mouth 2 times daily as needed for constipation   Refills:  0        Tangerine Oil        Refills:  0                Prescriptions were sent or printed at these locations (1 Prescription)                   Other Prescriptions                Printed at Department/Unit printer (1 of 1)         nitroFURantoin, macrocrystal-monohydrate, (MACROBID) 100 MG capsule                Procedures and tests performed during your visit     Abdomen US, limited (RUQ only)    CBC with platelets differential    Comprehensive metabolic panel    Lipase    Peripheral IV catheter    TSH    UA with Microscopic    XR Chest 2 Views      Orders Needing Specimen Collection     None      Pending Results     Date and Time Order Name Status Description    8/26/2018 1237 Abdomen US, limited (RUQ only) Preliminary             Pending Culture Results     No orders found from 8/24/2018 to 8/27/2018.            Pending Results Instructions     If you had any lab results that were not finalized at the time of your Discharge, you can call the ED Lab Result RN at [phone]. You will be contacted by this team for any positive Lab results or changes in treatment. The nurses are available 7 days a week from 10A to 6:30P.  You can leave a message 24 hours per day and they will return your call.        Thank you for choosing [facility]       Thank you for choosing [facility] for your care. Our goal is always to provide you with excellent care. Hearing back from our patients is one way we can continue to improve our services. Please take a few minutes to complete the written survey that you may receive in the mail after you visit with us. Thank you!        Care EveryWhere ID     This is  your Care EveryWhere ID. This could be used by other organizations to access your [place] medical records  [id number]        Equal Access to Services     PAULIE MARQUEZ : Elías Pretty, gutierrez dominguez, meli lopes, felipe snell. So [facility] [phone].    ATENCIÓN: Si habla español, tiene a olmstead disposición servicios gratuitos de asistencia lingüística. Llame al [phone].    We comply with applicable federal civil rights laws and [place] laws. We do not discriminate on the basis of race, color, national origin, age, disability, sex, sexual orientation, or gender identity.            After Visit Summary       This is your record. Keep this with you and show to your community pharmacist(s) and doctor(s) at your next visit.

## 2018-08-26 NOTE — DISCHARGE INSTRUCTIONS
Please make an appointment to follow up with Your Primary Care Provider and GI Clinic (phone: [phone]) in 3 days even if entirely better.      Abdominal Pain    Abdominal pain is pain in the stomach or belly area. Everyone has this pain from time to time. In many cases it goes away on its own. But abdominal pain can sometimes be due to a serious problem, such as appendicitis. So it s important to know when to seek help.  Causes of abdominal pain  There are many possible causes of abdominal pain. Common causes in adults include:    Constipation, diarrhea, or gas    Stomach acid flowing back up into the esophagus (acid reflux or heartburn)    Severe acid reflux, called GERD (gastroesophageal reflux disease)    A sore in the lining of the stomach or small intestine (peptic ulcer)    Inflammation of the gallbladder, liver, or pancreas    Gallstones or kidney stones    Appendicitis     Intestinal blockage     An internal organ pushing through a muscle or other tissue (hernia)    Urinary tract infections    In women, menstrual cramps, fibroids, or endometriosis    Inflammation or infection of the intestines  Diagnosing the cause of abdominal pain  Your healthcare provider will do a physical exam help find the cause of your pain. If needed, tests will be ordered. Belly pain has many possible causes. So it can be hard to find the reason for your pain. Giving details about your pain can help. Tell your provider where and when you feel the pain, and what makes it better or worse. Also let your provider know if you have other symptoms such as:    Fever    Tiredness    Upset stomach (nausea)    Vomiting    Changes in bathroom habits  Treating abdominal pain  Some causes of pain need emergency medical treatment right away. These include appendicitis or a bowel blockage. Other problems can be treated with rest, fluids, or medicines. Your healthcare provider can give you specific instructions for treatment or self-care based  on what is causing your pain.  If you have vomiting or diarrhea, sip water or other clear fluids. When you are ready to eat solid foods again, start with small amounts of easy-to-digest, low-fat foods. These include apple sauce, toast, or crackers.   When to seek medical care  Call 911 or go to the hospital right away if you:    Can t pass stool and are vomiting    Are vomiting blood or have bloody diarrhea or black, tarry diarrhea    Have chest, neck, or shoulder pain    Feel like you might pass out    Have pain in your shoulder blades with nausea    Have sudden, severe belly pain    Have new, severe pain unlike any you have felt before    Have a belly that is rigid, hard, and tender to touch  Call your healthcare provider if you have:    Pain for more than 5 days    Bloating for more than 2 days    Diarrhea for more than 5 days    A fever of 100.4 F (38 C) or higher, or as directed by your healthcare provider    Pain that gets worse    Weight loss for no reason    Continued lack of appetite    Blood in your stool  How to prevent abdominal pain  Here are some tips to help prevent abdominal pain:    Eat smaller amounts of food at one time.    Avoid greasy, fried, or other high-fat foods.    Avoid foods that give you gas.    Exercise regularly.    Drink plenty of fluids.  To help prevent GERD symptoms:    Quit smoking.    Reduce alcohol and certain foods that increase stomach acid.    Avoid aspirin and over-the-counter pain and fever medicines (NSAIDS or nonsteroidal anti-inflammatory drugs), if possible    Lose extra weight.    Finish eating at least 2 hours before you go to bed or lie down.    Raise the head of your bed.  Date Last Reviewed: 7/1/2016 2000-2017 [employer]. [address] [place] [address], [place] [address]. All rights reserved. This information is not intended as a substitute for professional medical care. Always follow your healthcare professional's instructions.

## 2018-08-26 NOTE — ED PROVIDER NOTES
History     Chief Complaint   Patient presents with     Generalized Weakness     c/o increasing weakness, unable to sleep, weight loss     Abdominal Pain     ongoing abdominal pain, worsening over the last 1 1/2 weeks,  nausea and vomiting     The history is provided by the patient.     Alka Mcfadden is a 57 year old female with a medical history of thyroid cancer status post thyroidectomy, gall stones, colon polyps, arachnoiditis, Scheurmann's disease, and Haney's esophagus who presents to the Emergency Department today for evaluation of abdominal pain, vomiting, and weakness. The patient states that she was in a coma 2 years ago and has been having issues with abdominal pain, nausea/vomiting, and weakness since that time. The patient states that her symptoms have been worse over the past 1.5 weeks. She also states that she has not been eating much due to her nausea and notes that she has been losing weight. The patient states that her abdominal pain is in her upper abdomen bilaterally. The patient also states that she has been having pain in her back. She states that it is like her spine is on fire. She due note that she has some urinary incontinence. The patient reports chills throughout the night as well as a cough during the night with productive white mucus. She also has shortness of breath and has needed to use her walker more frequently. She has been using cannabis for symptom control per [facility] recommendations.       I have reviewed the Medications, Allergies, Past Medical and Surgical History, and Social History in the [employer] system.    Past Medical History:   Diagnosis Date     Ankylosing spondylitis (H)      Arachnoiditis      Haney's esophagus determined by biopsy 1/2017     Cancer (H)     thyroid     Chronic pain syndrome      Pain in joint, pelvic region and thigh      Scheurmann's disease        Past Surgical History:   Procedure Laterality Date     ARTHROPLASTY REVISION HIP  2005, 2015      BACK SURGERY  1985/1990/1996     ENDOSCOPIC ULTRASOUND UPPER GASTROINTESTINAL TRACT (GI) N/A 1/5/2017    Procedure: ENDOSCOPIC ULTRASOUND, ESOPHAGOSCOPY / UPPER GASTROINTESTINAL TRACT (GI);  Surgeon: Esteban Mortensen MD;  Location: [facility] OR     ESOPHAGOSCOPY, GASTROSCOPY, DUODENOSCOPY (EGD), COMBINED N/A 1/5/2017    Procedure: COMBINED ESOPHAGOSCOPY, GASTROSCOPY, DUODENOSCOPY (EGD);  Surgeon: Esteban Mortensen MD;  Location: [facility] OR     JOINT REPLACEMENT  1985     THYROID SURGERY         No family history on file.    Social History   Substance Use Topics     Smoking status: Light Tobacco Smoker     Types: Cigarettes     Smokeless tobacco: Never Used      Comment: 3-4 cigarettes per week.     Alcohol use No       Current Facility-Administered Medications   Medication     ondansetron (ZOFRAN) injection 4 mg     oxyCODONE IR (ROXICODONE) tablet 5 mg     Current Outpatient Prescriptions   Medication     ADVAIR DISKUS 250-50 MCG/DOSE diskus inhaler     albuterol (PROAIR HFA/PROVENTIL HFA/VENTOLIN HFA) 108 (90 BASE) MCG/ACT Inhaler     diazepam (VALIUM) 5 MG tablet     medical cannabis (Patient's own supply.  Not a prescription)     Norethindrone (SANDRA PO)     senna-docusate (SENEXON-S) 8.6-50 MG per tablet     Tangerine OIL     mirtazapine (REMERON) 30 MG tablet     naloxone (NARCAN) nasal spray        Allergies   Allergen Reactions     Amoxicillin-Pot Clavulanate Rash     Augmentin Rash     Amitriptyline      Had reaction while on this and vistaril; was just not herself.     Hydroxyzine Other (See Comments)     Had reaction when took this along with amitriptyline.     Vistaril      Had reaction when took this along with amitriptyline.      Review of Systems   Constitutional: Positive for chills. Negative for diaphoresis, fatigue and fever.   HENT: Negative for ear pain.    Eyes: Negative for pain.   Respiratory: Positive for cough and shortness of breath. Negative for apnea, chest tightness and wheezing.     Cardiovascular: Negative for chest pain, palpitations and leg swelling.   Gastrointestinal: Positive for abdominal pain, nausea and vomiting. Negative for abdominal distention, blood in stool, constipation and diarrhea.   Endocrine: Negative for cold intolerance and heat intolerance.   Genitourinary: Negative for difficulty urinating, dysuria, flank pain, frequency, hematuria and urgency.   Musculoskeletal: Negative for arthralgias and myalgias.   Skin: Negative for rash.   Neurological: Positive for weakness. Negative for dizziness, seizures, syncope, numbness and headaches.   Hematological: Negative.    Psychiatric/Behavioral: Negative for agitation.   All other systems reviewed and are negative.    Physical Exam   BP: 118/77  Pulse: 88  Temp: 98.4  F (36.9  C)  Resp: 16  Weight: 49.5 kg (109 lb 2 oz)  SpO2: 97 %    Physical Exam   Constitutional: She is oriented to person, place, and time. She appears well-developed and well-nourished.   HENT:   Head: Normocephalic and atraumatic.   Right Ear: External ear normal.   Left Ear: External ear normal.   Eyes: Conjunctivae and EOM are normal. Pupils are equal, round, and reactive to light.   Neck: Normal range of motion. Neck supple.   Cardiovascular: Normal rate, regular rhythm, normal heart sounds and intact distal pulses.    Pulmonary/Chest: Effort normal and breath sounds normal. No respiratory distress.   Abdominal: Soft. She exhibits no distension. There is tenderness (Upper abd). There is no rebound and no guarding.   Musculoskeletal: Normal range of motion. She exhibits no edema, tenderness or deformity.   Neurological: She is alert and oriented to person, place, and time.   Psychiatric: She has a normal mood and affect.   Vitals reviewed.      ED Course   10:50 AM  The patient was seen and examined by Dr. Phan in Room 19.    ED Course     Procedures             Critical Care time:  none             Labs Ordered and Resulted from Time of ED Arrival Up to the  Time of Departure from the ED   ROUTINE UA WITH MICROSCOPIC - Abnormal; Notable for the following:        Result Value    Ketones Urine 40 (*)     Leukocyte Esterase Urine Large (*)     WBC Urine 62 (*)     RBC Urine 3 (*)     Squamous Epithelial /HPF Urine 5 (*)     Mucous Urine Present (*)     All other components within normal limits   CBC WITH PLATELETS DIFFERENTIAL   COMPREHENSIVE METABOLIC PANEL   LIPASE   TSH   PERIPHERAL IV CATHETER            Assessments & Plan (with Medical Decision Making)   Patient is a 57-year-old female with a long history of chronic pains who presents with 2 years of abdominal pain, worse recently in the right upper quadrant.  She also endorses some intermittent nausea but no episodes of emesis.  Initial vitals were within normal limits.  Based on patient's history, had a large differential diagnosis initially.  Labs were obtained and all within normal limits, no signs of leukocytosis or electrolyte abnormalities.  Biliary and hepatic function testing was also within normal limits.  Urinalysis was obtained and did show possible signs of infection.  On reassessment, patient did endorse some dysuria.  Right upper quadrant gallbladder ultrasound was obtained and similar to priors, showed a 1.8 cm cholelithiasis without overt cholecystitis.  After long discussion with the patient and her family, to discuss importance of follow-up with PCP and with GI physicians.  We will treat her UTI with Macrobid.  Patient was given strict return precautions and again advised on following up with her primary doctor.  Based on her normal and benign workup, do not suspect any current infection other than UTI.    I have reviewed the nursing notes.    I have reviewed the findings, diagnosis, plan and need for follow up with the patient.    New Prescriptions    NITROFURANTOIN, MACROCRYSTAL-MONOHYDRATE, (MACROBID) 100 MG CAPSULE    Take 1 capsule (100 mg) by mouth 2 times daily       Final diagnoses:    Abdominal pain, right upper quadrant   Acute cystitis without hematuria   Calculus of gallbladder without cholecystitis without obstruction   I, Francis Baird, am serving as a trained medical scribe to document services personally performed by Wilman Phan MD, based on the provider's statements to me.   I, Wilman Phan MD, was physically present and have reviewed and verified the accuracy of this note documented by Francis Baird.     8/26/2018   [facility], [place], EMERGENCY DEPARTMENT     Wilman Phan MD  08/26/18 [id number]

## 2018-08-27 ENCOUNTER — TELEPHONE (OUTPATIENT)
Dept: GASTROENTEROLOGY | Facility: CLINIC | Age: 58
End: 2018-08-27

## 2018-08-28 ENCOUNTER — OFFICE VISIT (OUTPATIENT)
Dept: GASTROENTEROLOGY | Facility: CLINIC | Age: 58
End: 2018-08-28
Payer: COMMERCIAL

## 2018-08-28 ENCOUNTER — TELEPHONE (OUTPATIENT)
Dept: GASTROENTEROLOGY | Facility: CLINIC | Age: 58
End: 2018-08-28

## 2018-08-28 VITALS
WEIGHT: 112.1 LBS | DIASTOLIC BLOOD PRESSURE: 79 MMHG | HEART RATE: 75 BPM | RESPIRATION RATE: 16 BRPM | HEIGHT: 64 IN | BODY MASS INDEX: 19.14 KG/M2 | SYSTOLIC BLOOD PRESSURE: 133 MMHG | OXYGEN SATURATION: 98 % | TEMPERATURE: 98.2 F

## 2018-08-28 DIAGNOSIS — R63.4 WEIGHT LOSS: Primary | ICD-10-CM

## 2018-08-28 RX ORDER — OXYCODONE AND ACETAMINOPHEN 5; 325 MG/1; MG/1
TABLET ORAL
COMMUNITY
Start: 2018-07-27

## 2018-08-28 ASSESSMENT — ENCOUNTER SYMPTOMS
INCREASED ENERGY: 1
SINUS CONGESTION: 0
DEPRESSION: 1
HYPERTENSION: 0
ALTERED TEMPERATURE REGULATION: 1
EXERCISE INTOLERANCE: 0
SWOLLEN GLANDS: 0
RECTAL PAIN: 1
SHORTNESS OF BREATH: 1
VOMITING: 1
WHEEZING: 1
ORTHOPNEA: 1
DYSPNEA ON EXERTION: 1
SKIN CHANGES: 0
TREMORS: 1
NUMBNESS: 1
MEMORY LOSS: 1
ABDOMINAL PAIN: 1
NECK PAIN: 1
SPUTUM PRODUCTION: 1
PALPITATIONS: 1
SEIZURES: 0
FATIGUE: 1
SMELL DISTURBANCE: 1
HYPOTENSION: 1
TASTE DISTURBANCE: 1
POLYPHAGIA: 0
CONSTIPATION: 1
DECREASED APPETITE: 1
ARTHRALGIAS: 1
HALLUCINATIONS: 0
STIFFNESS: 1
NAIL CHANGES: 0
SORE THROAT: 0
POLYDIPSIA: 0
NECK MASS: 0
BACK PAIN: 1
MUSCLE CRAMPS: 1
FEVER: 1
INSOMNIA: 1
COUGH: 0
COUGH DISTURBING SLEEP: 1
HEMOPTYSIS: 0
NAUSEA: 1
CHILLS: 1
NIGHT SWEATS: 1
MYALGIAS: 1
WEIGHT GAIN: 0
WEIGHT LOSS: 1
DYSURIA: 1
BLOATING: 1
DIFFICULTY URINATING: 0
SYNCOPE: 0
BOWEL INCONTINENCE: 1
JAUNDICE: 0
SLEEP DISTURBANCES DUE TO BREATHING: 1
WEAKNESS: 1
LIGHT-HEADEDNESS: 1
POOR WOUND HEALING: 0
SNORES LOUDLY: 0
HEARTBURN: 1
DIZZINESS: 1
NERVOUS/ANXIOUS: 1
DECREASED CONCENTRATION: 1
BLOOD IN STOOL: 0
SINUS PAIN: 0
DIARRHEA: 1
HEMATURIA: 0
LEG PAIN: 1
POSTURAL DYSPNEA: 1
BRUISES/BLEEDS EASILY: 1
SPEECH CHANGE: 0
HEADACHES: 0
JOINT SWELLING: 1
DISTURBANCES IN COORDINATION: 1
PARALYSIS: 0
LOSS OF CONSCIOUSNESS: 0
TINGLING: 1
PANIC: 1
TROUBLE SWALLOWING: 0
HOARSE VOICE: 0
MUSCLE WEAKNESS: 1
FLANK PAIN: 0

## 2018-08-28 ASSESSMENT — PAIN SCALES - GENERAL: PAINLEVEL: MODERATE PAIN (4)

## 2018-08-28 NOTE — PATIENT INSTRUCTIONS
It was a pleasure taking care of you today.  I've included a brief summary of our discussion and care plan from today's visit below.  Please review this information with your primary care provider.  ______________________________________________________________________    My recommendations are summarized as follows:      -- Plan for colonoscopy and upper endoscopy to be scheduled in the near future.    -- We will hold on the antiacid medication for now until your upper endoscopy, then determine which medication may be best    Reflux/heartburn/GERD relief:  -- Prop actual bed up, not just with pillows.  Propping up with just pillows can actually make things worse if it is causing you to bend at the waist while sleeping  -- Avoid triggers such as fatty foods, processed foods, and high fructose corn syrup (or food that contain these)    -- Recommend a trial of Miralax.  This is not a stimulant laxative and is safe to take on a daily basis.  Try 2-3 cap(s) every day.  You can titrate this dose (increase or decrease) based on stool frequency and consistency.     -- Continue this at least until the colonoscopy    Return to GI Clinic after procedures are completed to review your progress.    ______________________________________________________________________    Who do I call with any questions after my visit?  Please be in touch if there are any further questions that arise following today's visit.  There are multiple ways to contact your gastroenterology care team.        During business hours, you may reach a Gastroenterology nurse at [phone], option 3.       To schedule or reschedule an appointment, please call [phone].       You can always send a secure message through [employer].  [employer] messages are answered by your nurse or doctor typically within 24 hours.  Please allow extra time on weekends and holidays.        For urgent/emergent questions after business hours, you may reach the on-call GI Fellow by  contacting the [facility]  at [phone].     How will I get the results of any tests ordered?    You will receive all of your results.  If you have signed up for [employer], any tests ordered at your visit will be available to you after your physician reviews them.  Typically this takes 1-2 weeks.  If there are urgent results that require a change in your care plan, your physician or nurse will call you to discuss the next steps.      What is [employer]?  [employer] is a secure way for you to access all of your healthcare records from the [facility].  It is a web based computer program, so you can sign on to it from any location.  It also allows you to send secure messages to your care team.  I recommend signing up for [employer] access if you have not already done so and are comfortable with using a computer.      How to I schedule a follow-up visit?  If you did not schedule a follow-up visit today, please call [phone] to schedule a follow-up office visit.        Sincerely,    Pawel Uribe PA-C  [facility]  Division of Gastroenterology

## 2018-08-28 NOTE — MR AVS SNAPSHOT
After Visit Summary   8/28/2018    Alka Mcfadden    MRN: [id number]           Patient Information     Date Of Birth          1960        Visit Information        Provider Department      8/28/2018 12:00 PM Pawel Uribe PA-C [facility] Gastroenterology and IBD [facility]        Today's Diagnoses     Weight loss    -  1      Care Instructions    It was a pleasure taking care of you today.  I've included a brief summary of our discussion and care plan from today's visit below.  Please review this information with your primary care provider.  ______________________________________________________________________    My recommendations are summarized as follows:      -- Plan for colonoscopy and upper endoscopy to be scheduled in the near future.    -- We will hold on the antiacid medication for now until your upper endoscopy, then determine which medication may be best    Reflux/heartburn/GERD relief:  -- Prop actual bed up, not just with pillows.  Propping up with just pillows can actually make things worse if it is causing you to bend at the waist while sleeping  -- Avoid triggers such as fatty foods, processed foods, and high fructose corn syrup (or food that contain these)    -- Recommend a trial of Miralax.  This is not a stimulant laxative and is safe to take on a daily basis.  Try 2-3 cap(s) every day.  You can titrate this dose (increase or decrease) based on stool frequency and consistency.     -- Continue this at least until the colonoscopy    Return to GI Clinic after procedures are completed to review your progress.    ______________________________________________________________________    Who do I call with any questions after my visit?  Please be in touch if there are any further questions that arise following today's visit.  There are multiple ways to contact your gastroenterology care team.        During business hours, you may reach a Gastroenterology nurse at [phone], option 3.        To schedule or reschedule an appointment, please call [phone].       You can always send a secure message through [employer].  [employer] messages are answered by your nurse or doctor typically within 24 hours.  Please allow extra time on weekends and holidays.        For urgent/emergent questions after business hours, you may reach the on-call GI Fellow by contacting the [facility]  at [phone].     How will I get the results of any tests ordered?    You will receive all of your results.  If you have signed up for [employer], any tests ordered at your visit will be available to you after your physician reviews them.  Typically this takes 1-2 weeks.  If there are urgent results that require a change in your care plan, your physician or nurse will call you to discuss the next steps.      What is [employer]?  [employer] is a secure way for you to access all of your healthcare records from the [facility].  It is a web based computer program, so you can sign on to it from any location.  It also allows you to send secure messages to your care team.  I recommend signing up for [employer] access if you have not already done so and are comfortable with using a computer.      How to I schedule a follow-up visit?  If you did not schedule a follow-up visit today, please call [phone] to schedule a follow-up office visit.        Sincerely,    Pawel Uribe PA-C  [facility]  Division of Gastroenterology              Follow-ups after your visit        Additional Services     GASTROENTEROLOGY ADULT REF PROCEDURE ONLY       Last Lab Result: Creatinine (mg/dL)       Date                     Value                 08/26/2018               0.66             ----------  Body mass index is 19.26 kg/(m^2).     Needed:  No  Language:  English    Patient will be contacted to schedule procedure.     Please be aware that coverage of these services is subject to the terms and limitations  of your health insurance plan.  Call member services at your health plan with any benefit or coverage questions.  Any procedures must be performed at a [facility] facility OR coordinated by your clinic's referral office.    Please bring the following with you to your appointment:    (1) Any X-Rays, CTs or MRIs which have been performed.  Contact the facility where they were done to arrange for  prior to your scheduled appointment.    (2) List of current medications   (3) This referral request   (4) Any documents/labs given to you for this referral                  Your next 10 appointments already scheduled     Sep 27, 2018  3:00 PM CDT   [facility] with CHERY Campos   [facility]    [address]  [address]  [facility] [address]   [phone]            Sep 27, 2018  3:00 PM CDT   Return Visit with BOBBY Balbuena CNP   [facility] ([facility])    [address]  [address]  [facility] [id number] [phone]            Dec 21, 2018 10:00 AM CST   (Arrive by 9:45 AM)   New Patient Visit with Charlie Newman   [facility] ([facility])    [address]  [address]th [address]  [facility] [id number] [phone]              Future tests that were ordered for you today     Open Future Orders        Priority Expected Expires Ordered    Magnesium Routine  8/28/2019 8/28/2018    Phosphorus Routine  8/28/2019 8/28/2018    Vitamin A Routine 8/28/2018 9/27/2018 8/28/2018    Vitamin D Deficiency Routine 8/28/2018 9/27/2018 8/28/2018    Vitamin E Routine 8/28/2018 9/27/2018 8/28/2018    Zinc Routine 8/28/2018 9/27/2018 8/28/2018    Albumin level Routine 8/28/2018 9/27/2018 8/28/2018            Who to contact     Please call your clinic at [phone] to:    Ask questions about your health    Make or  "cancel appointments    Discuss your medicines    Learn about your test results    Speak to your doctor            Additional Information About Your Visit        Care EveryWhere ID     This is your Care EveryWhere ID. This could be used by other organizations to access your [facility] medical records  [id number]        Your Vitals Were     Pulse Temperature Respirations Height Pulse Oximetry BMI (Body Mass Index)    75 98.2  F (36.8  C) (Oral) 16 1.625 m (5' 3.98\") 98% 19.26 kg/m2       Blood Pressure from Last 3 Encounters:   08/28/18 133/79   08/26/18 113/52   07/25/18 132/72    Weight from Last 3 Encounters:   08/28/18 50.8 kg (112 lb 1.6 oz)   08/26/18 49.5 kg (109 lb 2 oz)   07/25/18 51.3 kg (113 lb)              We Performed the Following     GASTROENTEROLOGY ADULT REF PROCEDURE ONLY       Information about OPIOIDS     PRESCRIPTION OPIOIDS: WHAT YOU NEED TO KNOW   We gave you an opioid (narcotic) pain medicine. It is important to manage your pain, but opioids are not always the best choice. You should first try all the other options your care team gave you. Take this medicine for as short a time (and as few doses) as possible.    Some activities can increase your pain, such as bandage changes or therapy sessions. It may help to take your pain medicine 30 to 60 minutes before these activities. Reduce your stress by getting enough sleep, working on hobbies you enjoy and practicing relaxation or meditation. Talk to your care team about ways to manage your pain beyond prescription opioids.    These medicines have risks:    DO NOT drive when on new or higher doses of pain medicine. These medicines can affect your alertness and reaction times, and you could be arrested for driving under the influence (DUI). If you need to use opioids long-term, talk to your care team about driving.    DO NOT operate heavy machinery    DO NOT do any other dangerous activities while taking these medicines.    DO NOT drink any alcohol " while taking these medicines.     If the opioid prescribed includes acetaminophen, DO NOT take with any other medicines that contain acetaminophen. Read all labels carefully. Look for the word  acetaminophen  or  Tylenol.  Ask your pharmacist if you have questions or are unsure.    You can get addicted to pain medicines, especially if you have a history of addiction (chemical, alcohol or substance dependence). Talk to your care team about ways to reduce this risk.    All opioids tend to cause constipation. Drink plenty of water and eat foods that have a lot of fiber, such as fruits, vegetables, prune juice, apple juice and high-fiber cereal. Take a laxative (Miralax, milk of magnesia, Colace, Senna) if you don t move your bowels at least every other day. Other side effects include upset stomach, sleepiness, dizziness, throwing up, tolerance (needing more of the medicine to have the same effect), physical dependence and slowed breathing.    Store your pills in a secure place, locked if possible. We will not replace any lost or stolen medicine. If you don t finish your medicine, please throw away (dispose) as directed by your pharmacist. The [place] Pollution Control Agency has more information about safe disposal: [email or web address]://[email or web address].[email or web address]/living-green/[email or web address]-unwanted-medications         Primary Care Provider Office Phone # Fax #    BOBBY Balbuena [phone]       [address] [facility] [address]  [facility] [address]        Equal Access to Services     PAULIE MARQUEZ : Hadii jerry chua hadasho Soomaali, waaxda luqadaha, qaybta kaalmada adeegyada, felipe rocha . So [facility] [phone].    ATENCIÓN: Si habla español, tiene a olmstead disposición servicios gratuitos de asistencia lingüística. Llame al [phone].    We comply with applicable federal civil rights laws and [place] laws. We do not discriminate on the basis of race, color, national origin, age, disability, sex,  sexual orientation, or gender identity.            Thank you!     Thank you for choosing [facility] GASTROENTEROLOG[facility] IBD CLINIC  for your care. Our goal is always to provide you with excellent care. Hearing back from our patients is one way we can continue to improve our services. Please take a few minutes to complete the written survey that you may receive in the mail after your visit with us. Thank you!             Your Updated Medication List - Protect others around you: Learn how to safely use, store and throw away your medicines at www.disposemymeds.org.          This list is accurate as of 8/28/18  1:01 PM.  Always use your most recent med list.                   Brand Name Dispense Instructions for use Diagnosis    ADVAIR DISKUS 250-50 MCG/DOSE diskus inhaler   Generic drug:  fluticasone-salmeterol      INHALE 1 PUFF PO Q 12 HOURS .TK ON A SCHEDULED BASIS FOR COPD    Chronic obstructive pulmonary disease with acute lower respiratory infection (H)       albuterol 108 (90 Base) MCG/ACT inhaler    PROAIR HFA/PROVENTIL HFA/VENTOLIN HFA    1 Inhaler    Inhale 2 puffs into the lungs every 4 hours as needed for shortness of breath / dyspnea or wheezing    Chronic obstructive pulmonary disease with acute lower respiratory infection (H)       diazepam 5 MG tablet    VALIUM    60 tablet    Take 0.5-1 tablets (2.5-5 mg) by mouth every 12 hours as needed for anxiety or sleep    Anxiety, Chronic pain syndrome       SANDRA PO           medical cannabis (Patient's own supply.  Not a prescription)      See Admin Instructions (This is NOT a prescription, and does not certify that the patient has a qualifying medical condition for medical cannabis.  The purpose of this order is  to document that the patient reports taking medical cannabis.)        mirtazapine 30 MG tablet    REMERON    30 tablet    Take 1 tablet (30 mg) by mouth At Bedtime    Recurrent major depressive disorder, in partial remission (H)       naloxone nasal  spray    NARCAN    2 each    Spray 1 spray (4 mg) in nostril as needed for opioid reversal    Encounter for long-term use of opiate analgesic       nitroFURantoin (macrocrystal-monohydrate) 100 MG capsule    MACROBID    14 capsule    Take 1 capsule (100 mg) by mouth 2 times daily        oxyCODONE-acetaminophen 5-325 MG per tablet    PERCOCET          senna-docusate 8.6-50 MG per tablet    SENEXON-S    120 tablet    Take 2 tablets by mouth 2 times daily as needed for constipation    Encounter for long-term use of opiate analgesic       Tangerine Oil

## 2018-08-28 NOTE — LETTER
8/28/2018       RE: Alka Mcfadden  [address]  [facility] [address]     Dear Colleague,    Thank you for referring your patient, Alka Mcfadden, to the [facility] GASTROENTEROLOGY [facility] IBD CLINIC at [facility]. Please see a copy of my visit note below.    GI CLINIC VISIT    CC/REFERRING MD:  Referred Self  REASON FOR CONSULTATION: Abdominal pain    ASSESSMENT/PLAN:  57-year-old female with history of complex chronic pain syndrome s/p hip replacements for osteomyelitis (1998, 2009, 2016), anxiety, ankylosing spondylitis s/p cervical to sacral fusions, COPD, tobacco use, papillary thyroid cancer s/p lobectomy, GERD, indeterminate Haney's of the esophagus, cholelithiasis w/out cholecystitis who presents to the GI clinic for consultation regarding abdominal pain, nausea and weight loss.      1. Abdominal pain: This seems to be multifactorial.  Certainly the items addressed below to possibly include constipation and ongoing reflux could be contributing to patient's continued symptoms.  Patient does have complex chronic pain syndrome that has been extensively reviewed and specific sources of pain have not been identified.  She certainly has cholelithiasis, without cholecystitis, but no evidence of biliary obstruction based on labs obtained 2 days ago.  In regards to pertinent GI findings, thorough evaluation of her upper GI tract with EGD and EUS did not show any etiologies while she was evaluated here inpatient in 2017 to suggest a cause of patients symptoms.  We will also address the items below as these are likely strong contributors of patients pain.    2. Change in bowel habits:  We discussed the in frequency and consistency of bowel movements, and that constipation could certainly be contributing to ongoing abdominal pain, as she frequently needs to use senna as a rescue when she does not have a bowel movement for 2 days.  I recommend limiting stimulant laxatives as patient  can become dependant on this for BMs, and to move to daily MiraLAX as a gentle osmotic laxative that can be titrated to effect.  If this is not effective, we could consider Linzess or Amitiza as next steps.       3. GERD w/ nondysplastic Haney's of the esophagus: Patient is not on any antiacid regimen as it seems that omeprazole did not improve symptoms, rather worsen and/or bring on the regurgitation per the patient report.  Given her worsening symptoms, I have suggested a repeat upper endoscopy for further assessment.  Her Haney's was diagnosed in 2017, and was nondysplastic.  Recommendations for Haney's surveillance would be a repeat upper endoscopy every 2-3 years (next in 2020), however given symptoms, we will repeat the upper endoscopy in the near future and determine next steps for medication management.  Given she did have a good response to H2 blockers in the past, this could be a consideration.  Lifestyle modifications of GERD should also be reinforced.    4. Colorectal cancer screening: Patient's last colonoscopy was significant for tubular adenomas, some of which consistent with advanced adenoma, no high-grade dysplasia or invasive malignancy.  I recommend a follow-up colonoscopy at this time, performed in conjunction with her upper endoscopy as listed above, for further assessment and surveillance given her persistent symptoms.      RTC 3 months    Thank you for this consultation.  It was a pleasure to participate in the care of this patient; please contact us with any further questions.       Pawel Uribe PA-C  Division of Gastroenterology, Hepatology and Nutrition  [facility]      HPI  57-year-old female with history of complex chronic pain syndrome s/p hip replacements for osteomyelitis (1998, 2009, 2016), anxiety, ankylosing spondylitis s/p cervical to sacral fusions, COPD, tobacco use, papillary thyroid cancer s/p lobectomy, GERD, indeterminate Haney's of the esophagus,  "cholelithiasis w/out cholecystitis who presents to the GI clinic for consultation regarding abdominal pain, nausea and weight loss.      Patient's daughter was present at the beginning of the visit.  There was tension and frustration with the patient and patient's daughter due to persistent symptoms and lack of answers thus far.  There was a question at the start of the visit as to why she needed to explain her symptoms when she would need to further explain again to the doctor.  It was explained that this writer would be the provider for the visit.  At the start of the visit there was repeated requests for \"all imaging and all testing to be performed for the patient to find out what is wrong.\"  Upon reviewing medications and confirmation regarding route and dosage, there was a question regarding medical cannabis, as this was charted as \"patient's own supply\" and writer was confirming. Patient and patient's daughter became upset, asking why this statement would put this in the chart, stating it needed to be removed immediately as the medical cannabis has been prescribed by [facility]. Writer asked the patient if it would be beneficial if the patient's daughter left the room to allow for history to be obtained.  Patient's daughter left the room for the remainder of the visit and history then provided by the patient. The remainder of the visit was cordial, patient confirmed her questions were answered and she agreed with the plan of care.    Patient reports that symptoms of decreased appetite and weight loss started 2-1/2 years ago when \"they put me in a coma\" when she was admitted for hip pain.  She describes stomach pain that never goes away and waxes and wanes in severity.  Pain is described in the epigastric region and branches out to the sides.  She is unable to associate this with any particular foods or eating in general.  The pain is not associated with bowel movements.  She describes a sensation of a liquid " "coming up her esophagus and can prompt vomiting.  Her primary care provider recently started her on omeprazole 40 mg daily, which she started but she felt that this made the regurgitation sensation worse.  I do note in the primary care visit that she had taken ranitidine twice daily which helped with the \"acid.\"  She also notes she was given prednisone recently for foot pain, and that made \"everything worse\" specific to her sensation of regurgitation and liquid coming up.  Her bowel pattern is sporadic, she may go days without bowel movements, but notes never more than 2 days.  Bowel movements eventually pass that are \"small bits.\"  Occasionally she may feel the urge to have a bowel movement in the middle of the night, however she is unable to pass anything.  When she does not have a bowel movement over the course of 2 days, she will take senna in the evening and this allows for a bowel movement in the morning.  No blood in the stool.  She notes that she is no longer taking narcotic medications (last taken 2 months ago).  She is prescribed medical cannabis through [facility].  She takes Valium as needed for anxiety, on average once per month.  She has been trialed on Remeron, however discontinued due to \"worsening of symptoms.\"  She was not clear length of time for this trial.  Patient is currently on Macrobid due to a UTI.  Patient also notes that she \"always feels better when she is on antibiotics.\"  She recently saw her primary care provider for follow-up regarding to GERD symptoms.  It was then recommended to follow-up with GI for ongoing surveillance of Haney's esophagus.      Weight loss: Based on the weight chart provided by electronic medical record it appears patient has gained approximately 5 pounds over the last year.    Patient had been admitted here at [facility] in December 2016 due to right upper quadrant abdominal pain that she noted at that time to be present for a year.  At that point she had " decreased appetite and weight loss noted to be 30 pounds in 6-7 months, diarrhea, fatigue, generalized weakness.  Laboratory studies show vitamin B12, D, E, celiac markers, stool alpha-1 antitrypsin, pancreatic fecal elastase and qualitative fecal fat all within normal limits.  Stool samples to include C. difficile and enteric pathogens negative for infection.  There was notable vitamin A and vitamin K deficiencies.    Most recent endoscopic evaluation has included:    EGD/EUS 1/5/18 reveal esophageal mucosal changes suggestive of long segment Haney's esophagus.  No high risk endoscopic features.  Normal stomach.  Normal duodenum.  Hyperechoic, fatty appearing pancreas without features of chronic pancreatitis or ductal obstruction.  Glandular size appears grossly normal without obvious atrophy.  Fatty infiltration felt to be of no clinical significance.  Biopsies show normal duodenum, stomach polyp with fragments of gastric fundic mucosa with prominent foveolar hyperplasia, no evidence of dysplasia, H pylori not identified.  Esophagus biopsies at 40 cm show focal intestinal metaplasia, consistent with Haney's (if the specimen was taken from tubular esophagus), no evidence of dysplasia or malignancy, esophageal biopsies at 38 cm show no evidence of reflux however presence of columnar mucosa is gastric cardia with incomplete intestinal metaplasia.  Esophageal biopsies at 36 cm show focal minimal acute inflammation, columnar mucosa is gastric cardia with incomplete intestinal metaplasia no dysplasia.  Note from Dr. Mooney reveals no likely source for pain or weight loss.  Presence of a gallbladder stone but no features of cholecystitis.  Pancreas is fatty but otherwise unremarkable and not of clinical significance.  Small bowel biopsies were normal.    Last colonoscopy performed 1/13/2017 revealed normal ileum, polypectomy descending colon 31-35 mm (biopsies show sessile serrated adenoma, no overt dysplasia  present), polypectomy at the splenic flexure 16-20 mm (biopsies show tubular adenoma consistent with advanced adenoma, no high-grade dysplasia or invasive malignancy), polypectomy sigmoid proximal 21-25 mm (biopsy showed tubular adenoma consistent with advanced adenoma, no high-grade dysplasia or invasive malignancy), diverticulosis in the descending colon and sigmoid, normal anal canal remainder of the exam normal.      Most recent imaging:  CT Abd/pelvis ([facility]) 3/2017 shows no evidence of mesenteric ischemia, distended stomach and large stool burden.  CT Abd/pelvis ([facility]) 4/2017 shows no abd pelvic mass, fatty atrophy of pancreas  CT Abd/pelvis ([facility]) 9/2017 limited due to NISA, normal noncontast appearance of intra-abdominal soft tissue structures  US Abd/pelvis with doppler ([facility]) 3/2017 shows patent celiac artery, proximal SMA, proximal BEBE without hemodynamically significant disease. Normal aorta.  US Abd/pelvis 8/26/2018 shows cholelithiasis with single large gallstone, no gallbladder wall thickening or pericholecystic fluid, interval development of echogenic portal regions could indicate liver edema and clinical correlation recommended, pancreas totally obscured.    ROS:    No fevers or chills  + weight loss  No blurry vision, double vision or change in vision  No sore throat  No lymphadenopathy  No headache, paraesthesias, or weakness in a limb  No shortness of breath or wheezing  No chest pain or pressure  + arthralgias or myalgias  No rashes or skin changes  + odynophagia or dysphagia  No BRBPR, hematochezia, melena  No dysuria, frequency or urgency  No hot/cold intolerance or polyria  No anxiety or depression    PROBLEM LIST  Patient Active Problem List    Diagnosis Date Noted     Gastroesophageal reflux disease without esophagitis 06/29/2018     Priority: Medium     Calculus of gallbladder without cholecystitis 12/29/2017     Priority: Medium     Ankylosing spondylitis of  multiple sites in spine (H) 10/16/2017     Priority: Medium     Complex regional pain syndrome type 2 of left lower extremity 06/23/2017     Priority: Medium     Malignant neoplasm of thyroid gland (H) 06/20/2017     Priority: Medium     Chronic obstructive pulmonary disease (H) dx at [facility] 03/22/2017     Priority: Medium     Haney's esophagus determined by biopsy 01/11/2017     Priority: Medium     Pancreatic atrophy 12/07/2016     Priority: Medium     Nausea 11/17/2016     Priority: Medium     Suicidal ideation 11/16/2016     Priority: Medium     Chronic pain syndrome 07/12/2016     Priority: Medium     Anxiety 11/05/2015     Priority: Medium     Constipation 06/25/2014     Priority: Medium     Insomnia 06/25/2014     Priority: Medium     Tobacco use disorder 06/25/2014     Priority: Medium     Hyperlipidemia LDL goal <130 06/17/2014     Priority: Medium      ### Cataract, OU 04/08/2014     Priority: Medium     Dry eyes 04/08/2014     Priority: Medium     Hyperalgesia 12/23/2012     Priority: Medium     Encounter for long-term use of opiate analgesic 08/14/2012     Priority: Medium       PERTINENT PAST MEDICAL HISTORY:  Past Medical History:   Diagnosis Date     Ankylosing spondylitis (H)      Arachnoiditis      Haney's esophagus determined by biopsy 1/2017     Cancer (H)     thyroid     Chronic pain syndrome      Pain in joint, pelvic region and thigh      Scheurmann's disease        PREVIOUS SURGERIES:  Past Surgical History:   Procedure Laterality Date     ARTHROPLASTY REVISION HIP  2005, 2015     BACK SURGERY  1985/1990/1996     ENDOSCOPIC ULTRASOUND UPPER GASTROINTESTINAL TRACT (GI) N/A 1/5/2017    Procedure: ENDOSCOPIC ULTRASOUND, ESOPHAGOSCOPY / UPPER GASTROINTESTINAL TRACT (GI);  Surgeon: Esteban Mortensen MD;  Location:  OR     ESOPHAGOSCOPY, GASTROSCOPY, DUODENOSCOPY (EGD), COMBINED N/A 1/5/2017    Procedure: COMBINED ESOPHAGOSCOPY, GASTROSCOPY, DUODENOSCOPY (EGD);  Surgeon: Esteban Mortensen  MD Wilson;  Location: [facility] OR     JOINT REPLACEMENT  1985     THYROID SURGERY       ALLERGIES:     Allergies   Allergen Reactions     Amoxicillin-Pot Clavulanate Rash     Augmentin Rash     Amitriptyline      Had reaction while on this and vistaril; was just not herself.     Hydroxyzine Other (See Comments)     Had reaction when took this along with amitriptyline.     Vistaril      Had reaction when took this along with amitriptyline.       PERTINENT MEDICATIONS:    Current Outpatient Prescriptions:      ADVAIR DISKUS 250-50 MCG/DOSE diskus inhaler, INHALE 1 PUFF PO Q 12 HOURS .TK ON A SCHEDULED BASIS FOR COPD, Disp: , Rfl: 0     albuterol (PROAIR HFA/PROVENTIL HFA/VENTOLIN HFA) 108 (90 BASE) MCG/ACT Inhaler, Inhale 2 puffs into the lungs every 4 hours as needed for shortness of breath / dyspnea or wheezing, Disp: 1 Inhaler, Rfl: 3     diazepam (VALIUM) 5 MG tablet, Take 0.5-1 tablets (2.5-5 mg) by mouth every 12 hours as needed for anxiety or sleep, Disp: 60 tablet, Rfl: 0     nitroFURantoin, macrocrystal-monohydrate, (MACROBID) 100 MG capsule, Take 1 capsule (100 mg) by mouth 2 times daily, Disp: 14 capsule, Rfl: 0     senna-docusate (SENEXON-S) 8.6-50 MG per tablet, Take 2 tablets by mouth 2 times daily as needed for constipation, Disp: 120 tablet, Rfl: 0     Tangerine OIL, , Disp: , Rfl:      medical cannabis (Patient's own supply.  Not a prescription), See Admin Instructions (This is NOT a prescription, and does not certify that the patient has a qualifying medical condition for medical cannabis.  The purpose of this order is  to document that the patient reports taking medical cannabis.), Disp: , Rfl:      mirtazapine (REMERON) 30 MG tablet, Take 1 tablet (30 mg) by mouth At Bedtime (Patient not taking: Reported on 8/28/2018), Disp: 30 tablet, Rfl: 11     naloxone (NARCAN) nasal spray, Spray 1 spray (4 mg) in nostril as needed for opioid reversal (Patient not taking: Reported on 8/28/2018), Disp: 2 each, Rfl:  "1     Norethindrone (SANDRA PO), , Disp: , Rfl:      oxyCODONE-acetaminophen (PERCOCET) 5-325 MG per tablet, , Disp: , Rfl:     SOCIAL HISTORY:  Social History     Social History     Marital status:      Spouse name: N/A     Number of children: N/A     Years of education: N/A     Occupational History     Not on file.     Social History Main Topics     Smoking status: Former Smoker     Types: Cigarettes     Start date: 1975     Quit date: 2018     Smokeless tobacco: Never Used      Comment: 3-4 cigarettes per week.     Alcohol use 0.6 - 1.2 oz/week     1 - 2 Shots of liquor per week      Comment: 1-2 margaritas once/year     Drug use: No      Comment: medical cannabis     Sexual activity: Not Currently     Partners: Male     Other Topics Concern     Not on file     Social History Narrative       FAMILY HISTORY:  FH of CRC: grandmother  of pancreatic cancer at age 67 and son  of esophageal cancer.  FH of IBD: None  Past/family/social history reviewed and no changes    PHYSICAL EXAMINATION:  Constitutional: aaox3, cooperative, pleasant, not dyspneic/diaphoretic, no acute distress  Vitals reviewed: /79 (BP Location: Left arm, Patient Position: Sitting, Cuff Size: Adult Regular)  Pulse 75  Temp 98.2  F (36.8  C) (Oral)  Resp 16  Ht 1.625 m (5' 3.98\")  Wt 50.8 kg (112 lb 1.6 oz)  SpO2 98%  BMI 19.26 kg/m2  Wt:   Wt Readings from Last 2 Encounters:   18 50.8 kg (112 lb 1.6 oz)   18 49.5 kg (109 lb 2 oz)      Eyes: Sclera anicteric/injected  Ears/nose/mouth/throat: Normal oropharynx without ulcers or exudate, mucus membranes moist, hearing intact  Neck: supple, thyroid normal size  CV: No edema  Respiratory: Unlabored breathing  Lymph: No axillary, submandibular, supraclavicular or inguinal lymphadenopathy  Abd: Nondistended, +bs, no hepatosplenomegaly, pain to epigastric and RLQ regions, no peritoneal signs  Skin: warm, perfused, no jaundice  Psych: Normal affect  MSK: uses " a walker, requires assistance      PERTINENT STUDIES:    Office Visit on 06/29/2018   Component Date Value Ref Range Status     Color Urine 06/29/2018 Yellow   Final     Appearance Urine 06/29/2018 Clear   Final     Glucose Urine 06/29/2018 Negative  NEG^Negative mg/dL Final     Bilirubin Urine 06/29/2018 Negative  NEG^Negative Final     Ketones Urine 06/29/2018 Negative  NEG^Negative mg/dL Final     Specific Gravity Urine 06/29/2018 1.010  1.003 - 1.035 Final     Blood Urine 06/29/2018 Negative  NEG^Negative Final     pH Urine 06/29/2018 6.0  5.0 - 7.0 pH Final     Protein Albumin Urine 06/29/2018 Negative  NEG^Negative mg/dL Final     Urobilinogen Urine 06/29/2018 0.2  0.2 - 1.0 EU/dL Final     Nitrite Urine 06/29/2018 Negative  NEG^Negative Final     Leukocyte Esterase Urine 06/29/2018 Negative  NEG^Negative Final     Source 06/29/2018 Midstream Urine   Final           Again, thank you for allowing me to participate in the care of your patient.      Sincerely,    Pawel Uribe PA-C

## 2018-08-28 NOTE — PROGRESS NOTES
GI CLINIC VISIT    CC/REFERRING MD:  Referred Self  REASON FOR CONSULTATION: Abdominal pain    ASSESSMENT/PLAN:  57-year-old female with history of complex chronic pain syndrome s/p hip replacements for osteomyelitis (1998, 2009, 2016), anxiety, ankylosing spondylitis s/p cervical to sacral fusions, COPD, tobacco use, papillary thyroid cancer s/p lobectomy, GERD, indeterminate Haney's of the esophagus, cholelithiasis w/out cholecystitis who presents to the GI clinic for consultation regarding abdominal pain, nausea and weight loss.      1. Abdominal pain: This seems to be multifactorial.  Certainly the items addressed below to possibly include constipation and ongoing reflux could be contributing to patient's continued symptoms.  Patient does have complex chronic pain syndrome that has been extensively reviewed and specific sources of pain have not been identified.  She certainly has cholelithiasis, without cholecystitis, but no evidence of biliary obstruction based on labs obtained 2 days ago.  In regards to pertinent GI findings, thorough evaluation of her upper GI tract with EGD and EUS did not show any etiologies while she was evaluated here inpatient in 2017 to suggest a cause of patients symptoms.  We will also address the items below as these are likely strong contributors of patients pain.    2. Change in bowel habits:  We discussed the in frequency and consistency of bowel movements, and that constipation could certainly be contributing to ongoing abdominal pain, as she frequently needs to use senna as a rescue when she does not have a bowel movement for 2 days.  I recommend limiting stimulant laxatives as patient can become dependant on this for BMs, and to move to daily MiraLAX as a gentle osmotic laxative that can be titrated to effect.  If this is not effective, we could consider Linzess or Amitiza as next steps.       3. GERD w/ nondysplastic Haney's of the esophagus: Patient is not on any antiacid  regimen as it seems that omeprazole did not improve symptoms, rather worsen and/or bring on the regurgitation per the patient report.  Given her worsening symptoms, I have suggested a repeat upper endoscopy for further assessment.  Her Haney's was diagnosed in 2017, and was nondysplastic.  Recommendations for Haney's surveillance would be a repeat upper endoscopy every 2-3 years (next in 2020), however given symptoms, we will repeat the upper endoscopy in the near future and determine next steps for medication management.  Given she did have a good response to H2 blockers in the past, this could be a consideration.  Lifestyle modifications of GERD should also be reinforced.    4. Colorectal cancer screening: Patient's last colonoscopy was significant for tubular adenomas, some of which consistent with advanced adenoma, no high-grade dysplasia or invasive malignancy.  I recommend a follow-up colonoscopy at this time, performed in conjunction with her upper endoscopy as listed above, for further assessment and surveillance given her persistent symptoms.      RTC 3 months    Thank you for this consultation.  It was a pleasure to participate in the care of this patient; please contact us with any further questions.       Pawel Uribe PA-C  Division of Gastroenterology, Hepatology and Nutrition  [facility]      HPI  57-year-old female with history of complex chronic pain syndrome s/p hip replacements for osteomyelitis (1998, 2009, 2016), anxiety, ankylosing spondylitis s/p cervical to sacral fusions, COPD, tobacco use, papillary thyroid cancer s/p lobectomy, GERD, indeterminate Haney's of the esophagus, cholelithiasis w/out cholecystitis who presents to the GI clinic for consultation regarding abdominal pain, nausea and weight loss.      Patient's daughter was present at the beginning of the visit.  There was tension and frustration with the patient and patient's daughter due to persistent symptoms and  "lack of answers thus far.  There was a question at the start of the visit as to why she needed to explain her symptoms when she would need to further explain again to the doctor.  It was explained that this writer would be the provider for the visit.  At the start of the visit there was repeated requests for \"all imaging and all testing to be performed for the patient to find out what is wrong.\"  Upon reviewing medications and confirmation regarding route and dosage, there was a question regarding medical cannabis, as this was charted as \"patient's own supply\" and writer was confirming. Patient and patient's daughter became upset, asking why this statement would put this in the chart, stating it needed to be removed immediately as the medical cannabis has been prescribed by [facility]. Writer asked the patient if it would be beneficial if the patient's daughter left the room to allow for history to be obtained.  Patient's daughter left the room for the remainder of the visit and history then provided by the patient. The remainder of the visit was cordial, patient confirmed her questions were answered and she agreed with the plan of care.    Patient reports that symptoms of decreased appetite and weight loss started 2-1/2 years ago when \"they put me in a coma\" when she was admitted for hip pain.  She describes stomach pain that never goes away and waxes and wanes in severity.  Pain is described in the epigastric region and branches out to the sides.  She is unable to associate this with any particular foods or eating in general.  The pain is not associated with bowel movements.  She describes a sensation of a liquid coming up her esophagus and can prompt vomiting.  Her primary care provider recently started her on omeprazole 40 mg daily, which she started but she felt that this made the regurgitation sensation worse.  I do note in the primary care visit that she had taken ranitidine twice daily which helped with the " "\"acid.\"  She also notes she was given prednisone recently for foot pain, and that made \"everything worse\" specific to her sensation of regurgitation and liquid coming up.  Her bowel pattern is sporadic, she may go days without bowel movements, but notes never more than 2 days.  Bowel movements eventually pass that are \"small bits.\"  Occasionally she may feel the urge to have a bowel movement in the middle of the night, however she is unable to pass anything.  When she does not have a bowel movement over the course of 2 days, she will take senna in the evening and this allows for a bowel movement in the morning.  No blood in the stool.  She notes that she is no longer taking narcotic medications (last taken 2 months ago).  She is prescribed medical cannabis through [facility].  She takes Valium as needed for anxiety, on average once per month.  She has been trialed on Remeron, however discontinued due to \"worsening of symptoms.\"  She was not clear length of time for this trial.  Patient is currently on Macrobid due to a UTI.  Patient also notes that she \"always feels better when she is on antibiotics.\"  She recently saw her primary care provider for follow-up regarding to GERD symptoms.  It was then recommended to follow-up with GI for ongoing surveillance of Haney's esophagus.      Weight loss: Based on the weight chart provided by electronic medical record it appears patient has gained approximately 5 pounds over the last year.    Patient had been admitted here at [facility] in December 2016 due to right upper quadrant abdominal pain that she noted at that time to be present for a year.  At that point she had decreased appetite and weight loss noted to be 30 pounds in 6-7 months, diarrhea, fatigue, generalized weakness.  Laboratory studies show vitamin B12, D, E, celiac markers, stool alpha-1 antitrypsin, pancreatic fecal elastase and qualitative fecal fat all within normal limits.  Stool samples to include C. " difficile and enteric pathogens negative for infection.  There was notable vitamin A and vitamin K deficiencies.    Most recent endoscopic evaluation has included:    EGD/EUS 1/5/18 reveal esophageal mucosal changes suggestive of long segment Haney's esophagus.  No high risk endoscopic features.  Normal stomach.  Normal duodenum.  Hyperechoic, fatty appearing pancreas without features of chronic pancreatitis or ductal obstruction.  Glandular size appears grossly normal without obvious atrophy.  Fatty infiltration felt to be of no clinical significance.  Biopsies show normal duodenum, stomach polyp with fragments of gastric fundic mucosa with prominent foveolar hyperplasia, no evidence of dysplasia, H pylori not identified.  Esophagus biopsies at 40 cm show focal intestinal metaplasia, consistent with Haney's (if the specimen was taken from tubular esophagus), no evidence of dysplasia or malignancy, esophageal biopsies at 38 cm show no evidence of reflux however presence of columnar mucosa is gastric cardia with incomplete intestinal metaplasia.  Esophageal biopsies at 36 cm show focal minimal acute inflammation, columnar mucosa is gastric cardia with incomplete intestinal metaplasia no dysplasia.  Note from Dr. Mooney reveals no likely source for pain or weight loss.  Presence of a gallbladder stone but no features of cholecystitis.  Pancreas is fatty but otherwise unremarkable and not of clinical significance.  Small bowel biopsies were normal.    Last colonoscopy performed 1/13/2017 revealed normal ileum, polypectomy descending colon 31-35 mm (biopsies show sessile serrated adenoma, no overt dysplasia present), polypectomy at the splenic flexure 16-20 mm (biopsies show tubular adenoma consistent with advanced adenoma, no high-grade dysplasia or invasive malignancy), polypectomy sigmoid proximal 21-25 mm (biopsy showed tubular adenoma consistent with advanced adenoma, no high-grade dysplasia or invasive  malignancy), diverticulosis in the descending colon and sigmoid, normal anal canal remainder of the exam normal.      Most recent imaging:  CT Abd/pelvis ([facility]) 3/2017 shows no evidence of mesenteric ischemia, distended stomach and large stool burden.  CT Abd/pelvis ([facility]) 4/2017 shows no abd pelvic mass, fatty atrophy of pancreas  CT Abd/pelvis ([facility]) 9/2017 limited due to NISA, normal noncontast appearance of intra-abdominal soft tissue structures  US Abd/pelvis with doppler ([facility]) 3/2017 shows patent celiac artery, proximal SMA, proximal BEBE without hemodynamically significant disease. Normal aorta.  US Abd/pelvis 8/26/2018 shows cholelithiasis with single large gallstone, no gallbladder wall thickening or pericholecystic fluid, interval development of echogenic portal regions could indicate liver edema and clinical correlation recommended, pancreas totally obscured.    ROS:    No fevers or chills  + weight loss  No blurry vision, double vision or change in vision  No sore throat  No lymphadenopathy  No headache, paraesthesias, or weakness in a limb  No shortness of breath or wheezing  No chest pain or pressure  + arthralgias or myalgias  No rashes or skin changes  + odynophagia or dysphagia  No BRBPR, hematochezia, melena  No dysuria, frequency or urgency  No hot/cold intolerance or polyria  No anxiety or depression    PROBLEM LIST  Patient Active Problem List    Diagnosis Date Noted     Gastroesophageal reflux disease without esophagitis 06/29/2018     Priority: Medium     Calculus of gallbladder without cholecystitis 12/29/2017     Priority: Medium     Ankylosing spondylitis of multiple sites in spine (H) 10/16/2017     Priority: Medium     Complex regional pain syndrome type 2 of left lower extremity 06/23/2017     Priority: Medium     Malignant neoplasm of thyroid gland (H) 06/20/2017     Priority: Medium     Chronic obstructive pulmonary disease (H) dx at [facility] 03/22/2017      Priority: Medium     Haney's esophagus determined by biopsy 01/11/2017     Priority: Medium     Pancreatic atrophy 12/07/2016     Priority: Medium     Nausea 11/17/2016     Priority: Medium     Suicidal ideation 11/16/2016     Priority: Medium     Chronic pain syndrome 07/12/2016     Priority: Medium     Anxiety 11/05/2015     Priority: Medium     Constipation 06/25/2014     Priority: Medium     Insomnia 06/25/2014     Priority: Medium     Tobacco use disorder 06/25/2014     Priority: Medium     Hyperlipidemia LDL goal <130 06/17/2014     Priority: Medium      ### Cataract, OU 04/08/2014     Priority: Medium     Dry eyes 04/08/2014     Priority: Medium     Hyperalgesia 12/23/2012     Priority: Medium     Encounter for long-term use of opiate analgesic 08/14/2012     Priority: Medium       PERTINENT PAST MEDICAL HISTORY:  Past Medical History:   Diagnosis Date     Ankylosing spondylitis (H)      Arachnoiditis      Haney's esophagus determined by biopsy 1/2017     Cancer (H)     thyroid     Chronic pain syndrome      Pain in joint, pelvic region and thigh      Scheurmann's disease        PREVIOUS SURGERIES:  Past Surgical History:   Procedure Laterality Date     ARTHROPLASTY REVISION HIP  2005, 2015     BACK SURGERY  1985/1990/1996     ENDOSCOPIC ULTRASOUND UPPER GASTROINTESTINAL TRACT (GI) N/A 1/5/2017    Procedure: ENDOSCOPIC ULTRASOUND, ESOPHAGOSCOPY / UPPER GASTROINTESTINAL TRACT (GI);  Surgeon: Esteban Mortensen MD;  Location: [facility] OR     ESOPHAGOSCOPY, GASTROSCOPY, DUODENOSCOPY (EGD), COMBINED N/A 1/5/2017    Procedure: COMBINED ESOPHAGOSCOPY, GASTROSCOPY, DUODENOSCOPY (EGD);  Surgeon: Esteban Mortensen MD;  Location: [facility] OR     JOINT REPLACEMENT  1985     THYROID SURGERY       ALLERGIES:     Allergies   Allergen Reactions     Amoxicillin-Pot Clavulanate Rash     Augmentin Rash     Amitriptyline      Had reaction while on this and vistaril; was just not herself.     Hydroxyzine Other (See Comments)      Had reaction when took this along with amitriptyline.     Vistaril      Had reaction when took this along with amitriptyline.       PERTINENT MEDICATIONS:    Current Outpatient Prescriptions:      ADVAIR DISKUS 250-50 MCG/DOSE diskus inhaler, INHALE 1 PUFF PO Q 12 HOURS .TK ON A SCHEDULED BASIS FOR COPD, Disp: , Rfl: 0     albuterol (PROAIR HFA/PROVENTIL HFA/VENTOLIN HFA) 108 (90 BASE) MCG/ACT Inhaler, Inhale 2 puffs into the lungs every 4 hours as needed for shortness of breath / dyspnea or wheezing, Disp: 1 Inhaler, Rfl: 3     diazepam (VALIUM) 5 MG tablet, Take 0.5-1 tablets (2.5-5 mg) by mouth every 12 hours as needed for anxiety or sleep, Disp: 60 tablet, Rfl: 0     nitroFURantoin, macrocrystal-monohydrate, (MACROBID) 100 MG capsule, Take 1 capsule (100 mg) by mouth 2 times daily, Disp: 14 capsule, Rfl: 0     senna-docusate (SENEXON-S) 8.6-50 MG per tablet, Take 2 tablets by mouth 2 times daily as needed for constipation, Disp: 120 tablet, Rfl: 0     Tangerine OIL, , Disp: , Rfl:      medical cannabis (Patient's own supply.  Not a prescription), See Admin Instructions (This is NOT a prescription, and does not certify that the patient has a qualifying medical condition for medical cannabis.  The purpose of this order is  to document that the patient reports taking medical cannabis.), Disp: , Rfl:      mirtazapine (REMERON) 30 MG tablet, Take 1 tablet (30 mg) by mouth At Bedtime (Patient not taking: Reported on 8/28/2018), Disp: 30 tablet, Rfl: 11     naloxone (NARCAN) nasal spray, Spray 1 spray (4 mg) in nostril as needed for opioid reversal (Patient not taking: Reported on 8/28/2018), Disp: 2 each, Rfl: 1     Norethindrone (SANDRA PO), , Disp: , Rfl:      oxyCODONE-acetaminophen (PERCOCET) 5-325 MG per tablet, , Disp: , Rfl:     SOCIAL HISTORY:  Social History     Social History     Marital status:      Spouse name: N/A     Number of children: N/A     Years of education: N/A     Occupational History      "Not on file.     Social History Main Topics     Smoking status: Former Smoker     Types: Cigarettes     Start date: 1975     Quit date: 2018     Smokeless tobacco: Never Used      Comment: 3-4 cigarettes per week.     Alcohol use 0.6 - 1.2 oz/week     1 - 2 Shots of liquor per week      Comment: 1-2 margaritas once/year     Drug use: No      Comment: medical cannabis     Sexual activity: Not Currently     Partners: Male     Other Topics Concern     Not on file     Social History Narrative       FAMILY HISTORY:  FH of CRC: grandmother  of pancreatic cancer at age 67 and son  of esophageal cancer.  FH of IBD: None  Past/family/social history reviewed and no changes    PHYSICAL EXAMINATION:  Constitutional: aaox3, cooperative, pleasant, not dyspneic/diaphoretic, no acute distress  Vitals reviewed: /79 (BP Location: Left arm, Patient Position: Sitting, Cuff Size: Adult Regular)  Pulse 75  Temp 98.2  F (36.8  C) (Oral)  Resp 16  Ht 1.625 m (5' 3.98\")  Wt 50.8 kg (112 lb 1.6 oz)  SpO2 98%  BMI 19.26 kg/m2  Wt:   Wt Readings from Last 2 Encounters:   18 50.8 kg (112 lb 1.6 oz)   18 49.5 kg (109 lb 2 oz)      Eyes: Sclera anicteric/injected  Ears/nose/mouth/throat: Normal oropharynx without ulcers or exudate, mucus membranes moist, hearing intact  Neck: supple, thyroid normal size  CV: No edema  Respiratory: Unlabored breathing  Lymph: No axillary, submandibular, supraclavicular or inguinal lymphadenopathy  Abd: Nondistended, +bs, no hepatosplenomegaly, pain to epigastric and RLQ regions, no peritoneal signs  Skin: warm, perfused, no jaundice  Psych: Normal affect  MSK: uses a walker, requires assistance      PERTINENT STUDIES:    Office Visit on 2018   Component Date Value Ref Range Status     Color Urine 2018 Yellow   Final     Appearance Urine 2018 Clear   Final     Glucose Urine 2018 Negative  NEG^Negative mg/dL Final     Bilirubin Urine 2018 " Negative  NEG^Negative Final     Ketones Urine 06/29/2018 Negative  NEG^Negative mg/dL Final     Specific Gravity Urine 06/29/2018 1.010  1.003 - 1.035 Final     Blood Urine 06/29/2018 Negative  NEG^Negative Final     pH Urine 06/29/2018 6.0  5.0 - 7.0 pH Final     Protein Albumin Urine 06/29/2018 Negative  NEG^Negative mg/dL Final     Urobilinogen Urine 06/29/2018 0.2  0.2 - 1.0 EU/dL Final     Nitrite Urine 06/29/2018 Negative  NEG^Negative Final     Leukocyte Esterase Urine 06/29/2018 Negative  NEG^Negative Final     Source 06/29/2018 Midstream Urine   Final         Answers for HPI/ROS submitted by the patient on 8/28/2018   General Symptoms: Yes  Skin Symptoms: Yes  HENT Symptoms: Yes  EYE SYMPTOMS: No  HEART SYMPTOMS: Yes  LUNG SYMPTOMS: Yes  INTESTINAL SYMPTOMS: Yes  URINARY SYMPTOMS: Yes  GYNECOLOGIC SYMPTOMS: No  BREAST SYMPTOMS: No  SKELETAL SYMPTOMS: Yes  BLOOD SYMPTOMS: Yes  NERVOUS SYSTEM SYMPTOMS: Yes  MENTAL HEALTH SYMPTOMS: Yes  Fever: Yes  Loss of appetite: Yes  Weight loss: Yes  Weight gain: No  Fatigue: Yes  Night sweats: Yes  Chills: Yes  Increased stress: Yes  Excessive hunger: No  Excessive thirst: No  Feeling hot or cold when others believe the temperature is normal: Yes  Loss of height: Yes  Post-operative complications: No  Surgical site pain: Yes  Hallucinations: No  Change in or Loss of Energy: Yes  Hyperactivity: No  Confusion: Yes  Changes in hair: Yes  Changes in moles/birth marks: No  Itching: No  Rashes: No  Changes in nails: No  Acne: No  Hair in places you don't want it: No  Change in facial hair: No  Warts: No  Non-healing sores: No  Scarring: No  Flaking of skin: Yes  Color changes of hands/feet in cold : Yes  Sun sensitivity: Yes  Skin thickening: No  Ear pain: No  Ear discharge: No  Hearing loss: No  Tinnitus: No  Nosebleeds: No  Congestion: No  Sinus pain: No  Trouble swallowing: No   Voice hoarseness: No  Mouth sores: No  Sore throat: No  Tooth pain: No  Gum tenderness:  No  Bleeding gums: No  Change in taste: Yes  Change in sense of smell: Yes  Dry mouth: No  Hearing aid used: No  Neck lump: No  Cough: No  Sputum or phlegm: Yes  Coughing up blood: No  Difficulty breating or shortness of breath: Yes  Snoring: No  Wheezing: Yes  Difficulty breathing on exertion: Yes  Nighttime Cough: Yes  Difficulty breathing when lying flat: Yes  Chest pain or pressure: Yes  Fast or irregular heartbeat: Yes  Pain in legs with walking: Yes  Trouble breathing while lying down: Yes  Fingers or toes appear blue: Yes  High blood pressure: No  Low blood pressure: Yes  Fainting: No  Murmurs: No  Pacemaker: No  Varicose veins: No  Edema or swelling: Yes  Wake up at night with shortness of breath: Yes  Light-headedness: Yes  Exercise intolerance: No  Heart burn or indigestion: Yes  Nausea: Yes  Vomiting: Yes  Abdominal pain: Yes  Bloating: Yes  Constipation: Yes  Diarrhea: Yes  Blood in stool: No  Black stools: No  Rectal or Anal pain: Yes  Fecal incontinence: Yes  Yellowing of skin or eyes: No  Vomit with blood: No  Change in stools: Yes  Trouble holding urine or incontinence: Yes  Pain or burning: Yes  Trouble starting or stopping: Yes  Increased frequency of urination: Yes  Blood in urine: No  Decreased frequency of urination: No  Frequent nighttime urination: Yes  Flank pain: No  Difficulty emptying bladder: No  Back pain: Yes  Muscle aches: Yes  Neck pain: Yes  Swollen joints: Yes  Joint pain: Yes  Bone pain: Yes  Muscle cramps: Yes  Muscle weakness: Yes  Joint stiffness: Yes  Bone fracture: No  Anemia: No  Swollen glands: No  Easy bleeding or bruising: Yes  Trouble with coordination: Yes  Dizziness or trouble with balance: Yes  Fainting or black-out spells: No  Memory loss: Yes  Headache: No  Seizures: No  Speech problems: No  Tingling: Yes  Tremor: Yes  Weakness: Yes  Difficulty walking: Yes  Paralysis: No  Numbness: Yes  Nervous or Anxious: Yes  Depression: Yes  Trouble sleeping: Yes  Trouble  thinking or concentrating: Yes  Mood changes: Yes  Panic attacks: Yes

## 2018-08-28 NOTE — NURSING NOTE
"Chief Complaint   Patient presents with     Consult     Abdominal pain       Vitals:    08/28/18 1138   BP: 133/79   BP Location: Left arm   Patient Position: Sitting   Cuff Size: Adult Regular   Pulse: 75   Resp: 16   Temp: 98.2  F (36.8  C)   TempSrc: Oral   SpO2: 98%   Weight: 50.8 kg (112 lb 1.6 oz)   Height: 1.625 m (5' 3.98\")       Body mass index is 19.26 kg/(m^2).      Melissa Viramontes LPN                          "

## 2018-08-29 ENCOUNTER — HOSPITAL ENCOUNTER (OUTPATIENT)
Facility: CLINIC | Age: 58
End: 2018-08-29
Attending: INTERNAL MEDICINE | Admitting: INTERNAL MEDICINE
Payer: MEDICARE

## 2018-08-31 ENCOUNTER — PATIENT OUTREACH (OUTPATIENT)
Dept: CARE COORDINATION | Facility: CLINIC | Age: 58
End: 2018-08-31

## 2018-08-31 DIAGNOSIS — N39.0 URINARY TRACT INFECTION: Primary | ICD-10-CM

## 2018-08-31 NOTE — LETTER
[facility]  Complex Care Plan  About Me  Patient Name:  Alka Mcfadden    YOB: 1960  Age:     57 year old   Xu MRN:   [id number] Telephone Information:    Home Phone [phone]   Mobile [phone]       Address:    [address]   [place] [address] Email address:  [email or web address]      Emergency Contact(s)  Name Relationship Lgl Grd Work Phone Home Phone Mobile Phone   1. ANGUS MCFADDEN Spouse   [phone]   2. CHERELLE MCFADDEN Daughter   [phone]           Primary language:  English     needed? No   [place] [facility]:  [phone] op. 1  Other communication barriers:    Preferred Method of Communication:  Mail  Current living arrangement:    Mobility Status/ Medical Equipment:      Health Maintenance  Health Maintenance Reviewed: Not assessed    My Access Plan  Medical Emergency 911   Primary Clinic Line [facility] - [phone]   24 Hour Appointment Line [phone] or  [phone]) (toll-free)   24 Hour Nurse Line [phone] (toll-free)   Preferred Urgent Care     Preferred Hospital     Preferred Pharmacy [facility] [place] [address] [place] [address]     Behavioral Health Crisis Line The National Suicide Prevention Lifeline at 1-393.299.2560 or 911     My Care Team Members    Patient Care Team       Relationship Specialty Notifications Start End    Shanda Vo APRN CNP PCP - General Nurse Practitioner - Family  7/15/16     Phone: [phone] Fax: [phone]         [address]  [facility] [address]    Arelis Nurse Coordinator Palliative  11/22/16     Comment:  [facility] RN    Phone: [phone]         Vaughn Eldridge, [facility]   - Clinical  10/16/17     Phone: [phone] Fax: [phone]         [facility] [address]   [facility] [address]    Pati Deras, RN Lead Care Coordinator Primary Care - CC  8/31/18     Phone: [phone] Fax: [phone]                My Care Plans  Self Management and Treatment Plan  Goals and (Comments)  Goals        General    # 1 Medical (pt-stated)     Notes - Note created  8/31/2018 10:24 AM by Kayla Garcia, RN    Goal Statement:I will monitor increased urinary tract symptoms  Measure of Success: free of symptoms  Supportive Steps to Achieve:   I will finish course of Macrobid   I will drink 6-8 glasses of water   I will make an ED follow up with provider   Barriers: Multiple symptoms   Strengths: Patient agrees with the plan   Date to Achieve By: to be determined   Patient expressed understanding of goal: Yes               Action Plans on File: None                      Advance Care Plans/Directives Type: None       My Medical and Care Information  Problem List   Patient Active Problem List   Diagnosis     Encounter for long-term use of opiate analgesic     Hyperalgesia      ### Cataract, OU     Dry eyes     Hyperlipidemia LDL goal <130     Constipation     Insomnia     Tobacco use disorder     Anxiety     Chronic pain syndrome     Suicidal ideation     Nausea     Pancreatic atrophy     Haney's esophagus determined by biopsy     Chronic obstructive pulmonary disease (H) dx at [place]     Malignant neoplasm of thyroid gland (H)     Complex regional pain syndrome type 2 of left lower extremity     Ankylosing spondylitis of multiple sites in spine (H)     Calculus of gallbladder without cholecystitis     Gastroesophageal reflux disease without esophagitis      Current Medications and Allergies:  See printed Medication Report.    Care Coordination Start Date: 8/31/2018   Frequency of Care Coordination: weekly   Form Last Updated: 08/31/2018

## 2018-08-31 NOTE — LETTER
[facility]   [phone]      8/31/2018       Alka Mcfadden  [address]  [facility] [address]        Dear Alka,  It was a pleasure to speak with you.  I would like to provide you with the enclosed emergency contact  information for your records.  As part of care coordination, we are developing care plans to assist in accomplishing your health care goals.  When you  speak next to Anne Bazzi your clinic care coordinator , please feel free to let her know if you want to add or change any information on your care plans.        Sincerely,        Kayla Garcia RN, Care Coordinator  [phone]

## 2018-08-31 NOTE — PROGRESS NOTES
"Clinic Care Coordination Contact    Clinic Care Coordination Contact  OUTREACH  Covering today for Anne Deras RN CC /Integrated clinic     Referral Information:  Referral Source: ED Follow-Up    Primary Diagnosis: Genitourinary Disorders    Chief Complaint   Patient presents with     Clinic Care Coordination - Post Hospital     Clinic Care Coordination RN         Universal Utilization: 8/26/2018 visit UTI  Clinic Utilization  Difficulty keeping appointments:: No  Utilization    Last refreshed: 8/30/2018  3:48 PM:  No Show Count (past year) 0       Last refreshed: 8/30/2018  3:48 PM:  ED visits 1       Last refreshed: 8/30/2018  3:48 PM:  Hospital admissions 0          Current as of: 8/30/2018  3:48 PM             Clinical Concerns:  Current Medical Concerns:  Patient reports not feeling much better.  The burning in vaginal and Butt\" is much better.  Patient continues the course of antibiotic and encouraged to drink plenty of water .  Patient continues to have discomfort in stomach.emesis and poor appetite  and Omeprazole is not helping   CC encourage patient to call PCP clinic today due to symptoms.  Patient agrees with the plan .        Health Maintenance Reviewed: Not assessed  Clinical Pathway: None    Medication Management:  Reviewed      Functional Status:  Bed or wheelchair confined:: No      Advance Care Plan/Directive  Advanced Care Plans/Directives on file:: No          Goals:   Goals        General    # 1 Medical (pt-stated)     Notes - Note created  8/31/2018 10:24 AM by Kayla Garcia RN    Goal Statement:I will monitor increased urinary tract symptoms  Measure of Success: free of symptoms  Supportive Steps to Achieve:   I will finish course of Macrobid   I will drink 6-8 glasses of water   I will make an ED follow up with provider   Barriers: Multiple symptoms   Strengths: Patient agrees with the plan   Date to Achieve By: to be determined   Patient expressed understanding of goal: Yes        "         Patient/Caregiver understanding: Patient expresses good understanding of discharge instructions   Outreach Frequency: weekly  Future Appointments              In 3 weeks Shanda Vo APRN CNP [facility], [facility]    In 3 weeks Cherelle Aguilera LICSW [facility], [facility]    In 3 months Charlie Newman [facility], [facility]          Plan: Patient agrees to have a follow up call by Anne Deras RN CC /[facility] clinic in 3-5 business days     Kayla Garcia RN / Clinical Care Coordinator     [facility]   [email or web address]  Office :  [phone] / Fax :  [phone]

## 2018-09-05 ENCOUNTER — TELEPHONE (OUTPATIENT)
Dept: FAMILY MEDICINE | Facility: CLINIC | Age: 58
End: 2018-09-05

## 2018-09-05 NOTE — TELEPHONE ENCOUNTER
Pt called stating that she is moving to [place] on 9/12/18 and wants to schedule an appt with Shanda, however there is nothing available. Pt is wondering if she can possibly be squeezed in.     Pt is very worried about her Percocet rx & her Valium rx as she will run out soon and does not have a provider once she moves. See refill information below.    Pt tel: [phone] (okay to leave a detailed message)    oxyCODONE-acetaminophen (PERCOCET) 5-325 MG per tablet  Last Written Prescription Date:  7/27/18  Last Fill Quantity: Historical,  # refills: Historical   Last office visit: 7/25/2018 with prescribing provider:     Future Office Visit:   Next 5 appointments (look out 90 days)     Sep 27, 2018  3:00 PM CDT   [facility] with [facility]   [facility] ([facility]    [address]  [address]  [facility] [address] [phone]            Sep 27, 2018  3:00 PM CDT   Return Visit with BOBBY Balbuena CNP   [facility] ([facility]    [address]  [address]  [facility] [id number] [phone]                 diazepam (VALIUM) 5 MG tablet  Last Written Prescription Date:  6/29/18  Last Fill Quantity: 60,  # refills: 0   Last office visit: 7/25/2018 with prescribing provider:     Future Office Visit:   Next 5 appointments (look out 90 days)     Sep 27, 2018  3:00 PM CDT   Return Visit with CHERY Campos   [facility] ([facility]    [address]  [address]  [facility] [address] [phone]            Sep 27, 2018  3:00 PM CDT   Return Visit with BOBBY Balbuena CNP   [facility] ([facility])    [address]  [address]  [facility] [id number] [phone]                   Kae Xiong

## 2018-09-06 NOTE — TELEPHONE ENCOUNTER
You can offer her an appt with me Tuesday 9/11 in the morning and I can consult with Shanda about her meds, Shanda has no availability      Nirmala Bergeron [facility]  MEDICAL LEAD

## 2018-09-07 NOTE — PROGRESS NOTES
Clinic Care Coordination Contact  [facility]/Voicemail    Referral Source: ED Follow-Up  Clinical Data: Care Coordinator Outreach  Outreach attempted x 2.  Left message on voicemail with call back information and requested return call.  Plan:  Care Coordinator will do no further outreaches at this time.    Anne Deras R.N.  Clinic Care Coordinator  [facility]  [phone]

## 2018-09-14 ENCOUNTER — PATIENT OUTREACH (OUTPATIENT)
Dept: CARE COORDINATION | Facility: CLINIC | Age: 58
End: 2018-09-14

## 2018-09-17 ENCOUNTER — TELEPHONE (OUTPATIENT)
Dept: FAMILY MEDICINE | Facility: CLINIC | Age: 58
End: 2018-09-17

## 2018-09-17 DIAGNOSIS — G47.01 INSOMNIA DUE TO MEDICAL CONDITION: ICD-10-CM

## 2018-09-17 RX ORDER — TEMAZEPAM 30 MG
30 CAPSULE ORAL
Qty: 30 CAPSULE | Refills: 1 | Status: SHIPPED | OUTPATIENT
Start: 2018-09-17

## 2018-09-17 NOTE — TELEPHONE ENCOUNTER
Pt called to leave a message for Shanda. Pt is moving to [place] on Saturday 9/22/18. Pt stated that it will take approximately 30 days for insurance to tx and the pt only has 11 of her temazepam (RESTORIL) 30 MG capsule left. Pt is requesting a refill/bridge to help get her to when she is able to see a provider in [place].    Preferred Pharmacy: [facility] tel: [phone]    Pt stated that to reach her until Saturday she can be reached @ [phone] (okay to leave a detailed message)    After Saturday she wants us to call her 's phone @ [phone] (okay to leave a detailed message)     Or her daughters cell @ [phone] (okay to leave a detailed message)    Kae Xiong

## 2018-09-17 NOTE — TELEPHONE ENCOUNTER
Prescription written with note that it is OK to fill early since she is moving.  Will give the prescription to the Nurses to call in or fax.     Shanda Vo CNP, APNP  [facility] Primary [facility]

## 2018-09-18 NOTE — TELEPHONE ENCOUNTER
RX for Restoril sent via mail to Alka in care of her mother: Mariah Badillo  [address], [place]. [facility]. [address].  Tasneem Dejesus RN

## 2018-09-27 ENCOUNTER — TELEPHONE (OUTPATIENT)
Dept: GASTROENTEROLOGY | Facility: CLINIC | Age: 58
End: 2018-09-27

## 2018-12-27 ENCOUNTER — TELEPHONE (OUTPATIENT)
Dept: FAMILY MEDICINE | Facility: CLINIC | Age: 58
End: 2018-12-27

## 2018-12-27 NOTE — TELEPHONE ENCOUNTER
Pt called today to request a referral [facility] with Dr. Suzan Sher   Pt is now in [place]    Pt would also like to request a discharge letter.     Pt would like the last 4 office visit notes, discharge letter, and the referral faxed to Dr. Sher at [phone]. Thanks!

## 2018-12-27 NOTE — TELEPHONE ENCOUNTER
Unable to reach pt at the number on record, if she does call please refer her to [facility] for release of records.  Tasneem Dejesus RN

## 2019-01-07 ENCOUNTER — TELEPHONE (OUTPATIENT)
Dept: FAMILY MEDICINE | Facility: CLINIC | Age: 59
End: 2019-01-07

## 2019-01-07 NOTE — LETTER
[facility]  [address]  [facility] [address]  [phone]          January 7, 2019        To Whom It May Concern,    RE: Alka Mcfadden                                                                                                                     [address] [place] [address]      This patient has moved to [place], and is no longer in my care. Patient is free to see Dr. Suzan Sher for pain management.  She has been a patient at our Integrated Primary care clinic for several years.  She has a long history of chronic pain and has been responsible and collaborative with her care team at [facility].  We hope that she is able to establish care with a pain provider in the vicinity of her new home in [place].     Please contact me at the above number for any concerns.        Sincerely,         Shanda ROSALES, CNP

## 2019-01-07 NOTE — TELEPHONE ENCOUNTER
Patient called stating that she in unable to schedule an appt with a provider in [place] due to still being a patient of our clinic. Letter pended and routed to Shanda for approval.     Ramona Torres RN  01/07/19  12:56 PM

## 2019-01-09 NOTE — TELEPHONE ENCOUNTER
Printed and signed. Given to nursing staff.  Shanda Vo CNP, APNP  [facility] Primary Care [facility]

## 2019-02-12 ENCOUNTER — TRANSFERRED RECORDS (OUTPATIENT)
Dept: HEALTH INFORMATION MANAGEMENT | Facility: CLINIC | Age: 59
End: 2019-02-12

## 2019-05-08 ENCOUNTER — TRANSFERRED RECORDS (OUTPATIENT)
Dept: HEALTH INFORMATION MANAGEMENT | Facility: CLINIC | Age: 59
End: 2019-05-08

## 2019-07-08 ENCOUNTER — TRANSFERRED RECORDS (OUTPATIENT)
Dept: HEALTH INFORMATION MANAGEMENT | Facility: CLINIC | Age: 59
End: 2019-07-08

## 2023-10-31 NOTE — MR AVS SNAPSHOT
After Visit Summary   4/12/2018    Alka Mcfadden    MRN: [id number]           Patient Information     Date Of Birth          1960        Visit Information        Provider Department      4/12/2018 11:00 AM Shanda Vo APRN CNP [facility]        Today's Diagnoses     Malignant neoplasm of thyroid gland (H)    -  1    Encounter for long-term use of opiate analgesic        Chronic pain syndrome        Complex regional pain syndrome type 2 of left lower extremity        Calculus of gallbladder without cholecystitis without obstruction        Encounter for HCV screening test for low risk patient        Skin lesion          Care Instructions    The key with the Miralax is to take the same amount each day, could be 1 teaspoon, up to 3 tablespoons.             Follow-ups after your visit        Additional Services     DERMATOLOGY REFERRAL       Your provider has referred you to: [place]: [facility] [phone]  [email or web address]    Please be aware that coverage of these services is subject to the terms and limitations of your health insurance plan.  Call member services at your health plan with any benefit or coverage questions.      Please bring the following with you to your appointment:    (1) Any X-Rays, CTs or MRIs which have been performed.  Contact the facility where they were done to arrange for  prior to your scheduled appointment.    (2) List of current medications  (3) This referral request   (4) Any documents/labs given to you for this referral                  Future tests that were ordered for you today     Open Future Orders        Priority Expected Expires Ordered    **Hepatitis C Screen Reflex to RNA FUTURE anytime Routine 4/12/2018 4/12/2019 4/12/2018            Who to contact     If you have questions or need follow up information about today's clinic visit or your schedule please contact [facility]  "[facility] directly at [phone].  Normal or non-critical lab and imaging results will be communicated to you by MyChart, letter or phone within 4 business days after the clinic has received the results. If you do not hear from us within 7 days, please contact the clinic through [employer] or phone. If you have a critical or abnormal lab result, we will notify you by phone as soon as possible.  Submit refill requests through [employer] or call your pharmacy and they will forward the refill request to us. Please allow 3 business days for your refill to be completed.          Additional Information About Your Visit        [employer] Information     [employer] lets you send messages to your doctor, view your test results, renew your prescriptions, schedule appointments and more. To sign up, go to [email or web address] . Click on \"Log in\" on the left side of the screen, which will take you to the Welcome page. Then click on \"Sign up Now\" on the right side of the page.     You will be asked to enter the access code listed below, as well as some personal information. Please follow the directions to create your username and password.     Your access code is: [email or web address]  Expires: 2018 11:33 AM     Your access code will  in 90 days. If you need help or a new code, please call your [place] [facility] clinic or [phone].        Care EveryWhere ID     This is your Care EveryWhere ID. This could be used by other organizations to access your [facility] medical records  [id number]        Your Vitals Were     Pulse Temperature Respirations Pulse Oximetry BMI (Body Mass Index)       75 98.2  F (36.8  C) (Oral) 18 95% 19.64 kg/m2        Blood Pressure from Last 3 Encounters:   18 102/64   18 98/64   18 102/64    Weight from Last 3 Encounters:   18 118 lb (53.5 kg)   18 115 lb (52.2 kg)   18 112 lb 8 oz (51 kg)              We Performed the Following     Anti thyroglobulin antibody     " CBC with platelets     DERMATOLOGY REFERRAL     T3, Free     T4, free     TSH        Primary Care Provider Office Phone # Fax #    Shanda Vo, APRN [phone]       [address] [facility] [address] [place] [address]        Equal Access to Services     PAULIE MARQUEZ : Hadii aad ku hadasho Soomaali, waaxda luqadaha, qaybta kaalmada adeegyada, waxay idiin hayaan adeeg khernie josefina snell. So [facility] [phone].    ATENCIÓN: Si habla español, tiene a olmstead disposición servicios gratuitos de asistencia lingüística. Llame al [phone].    We comply with applicable federal civil rights laws and [place] laws. We do not discriminate on the basis of race, color, national origin, age, disability, sex, sexual orientation, or gender identity.            Thank you!     Thank you for choosing [facility]  for your care. Our goal is always to provide you with excellent care. Hearing back from our patients is one way we can continue to improve our services. Please take a few minutes to complete the written survey that you may receive in the mail after your visit with us. Thank you!             Your Updated Medication List - Protect others around you: Learn how to safely use, store and throw away your medicines at www.disposemymeds.org.          This list is accurate as of 4/12/18 11:42 AM.  Always use your most recent med list.                   Brand Name Dispense Instructions for use Diagnosis    ADVAIR DISKUS 250-50 MCG/DOSE diskus inhaler   Generic drug:  fluticasone-salmeterol      INHALE 1 PUFF PO Q 12 HOURS .TK ON A SCHEDULED BASIS FOR COPD    Chronic obstructive pulmonary disease with acute lower respiratory infection (H)       albuterol 108 (90 Base) MCG/ACT Inhaler    PROAIR HFA/PROVENTIL HFA/VENTOLIN HFA    1 Inhaler    Inhale 2 puffs into the lungs every 4 hours as needed for shortness of breath / dyspnea or wheezing    Chronic obstructive pulmonary disease with acute lower  respiratory infection (H)       diazepam 10 MG tablet    VALIUM    60 tablet    Take 0.5-1 tablets (5-10 mg) by mouth every 12 hours as needed for anxiety or sleep    Anxiety, Chronic pain syndrome       mirtazapine 30 MG tablet    REMERON    30 tablet    Take 1 tablet (30 mg) by mouth At Bedtime    Recurrent major depressive disorder, in partial remission (H)       naloxone nasal spray    NARCAN    2 each    Spray 1 spray (4 mg) in nostril as needed for opioid reversal    Encounter for long-term use of opiate analgesic       omeprazole 40 MG capsule    priLOSEC    30 capsule    Take 1 capsule (40 mg) by mouth daily Take 30-60 minutes before a meal.    Haney's esophagus without dysplasia       ondansetron 4 MG tablet    ZOFRAN    40 tablet    Take 1 tablet (4 mg) by mouth every 8 hours as needed for nausea    Nausea       oxyCODONE-acetaminophen 5-325 MG per tablet    PERCOCET    240 tablet    Take 1-2 tablets by mouth every 4 hours as needed for pain maximum 8 tablet(s) per day    Chronic pain syndrome, Malignant neoplasm of thyroid gland (H), Complex regional pain syndrome type 2 of left lower extremity       senna-docusate 8.6-50 MG per tablet    SENEXON-S    120 tablet    Take 2 tablets by mouth 2 times daily as needed for constipation    Encounter for long-term use of opiate analgesic       temazepam 30 MG capsule    RESTORIL    30 capsule    TAKE ONE CAPSULE (30MG) BY MOUTH NIGHTLY AS NEEDED FOR SLEEP    Insomnia, unspecified type, Chronic pain syndrome          General Sunscreen Counseling: I recommended a broad spectrum sunscreen with a SPF of 30 or higher.  I explained that SPF 30 sunscreens block approximately 97 percent of the sun's harmful rays.  Sunscreens should be applied at least 15 minutes prior to expected sun exposure and then every 2 hours after that as long as sun exposure continues. If swimming or exercising sunscreen should be reapplied every 45 minutes to an hour after getting wet or sweating.  One ounce, or the equivalent of a shot glass full of sunscreen, is adequate to protect the skin not covered by a bathing suit. I also recommended a lip balm with a sunscreen as well. Sun protective clothing can be used in lieu of sunscreen but must be worn the entire time you are exposed to the sun's rays. Detail Level: Detailed